# Patient Record
Sex: FEMALE | Race: WHITE | NOT HISPANIC OR LATINO | Employment: OTHER
[De-identification: names, ages, dates, MRNs, and addresses within clinical notes are randomized per-mention and may not be internally consistent; named-entity substitution may affect disease eponyms.]

---

## 2017-04-17 DIAGNOSIS — E55.9 VITAMIN D DEFICIENCY: Primary | ICD-10-CM

## 2017-04-17 PROCEDURE — 82306 VITAMIN D 25 HYDROXY: CPT | Mod: 90 | Performed by: NURSE PRACTITIONER

## 2017-04-17 PROCEDURE — 99000 SPECIMEN HANDLING OFFICE-LAB: CPT | Performed by: NURSE PRACTITIONER

## 2017-04-17 PROCEDURE — 36415 COLL VENOUS BLD VENIPUNCTURE: CPT | Performed by: NURSE PRACTITIONER

## 2017-04-19 LAB — DEPRECATED CALCIDIOL+CALCIFEROL SERPL-MC: 15 UG/L (ref 20–75)

## 2017-07-08 ENCOUNTER — HEALTH MAINTENANCE LETTER (OUTPATIENT)
Age: 57
End: 2017-07-08

## 2017-08-22 DIAGNOSIS — Z12.31 VISIT FOR SCREENING MAMMOGRAM: Primary | ICD-10-CM

## 2017-10-03 ENCOUNTER — APPOINTMENT (OUTPATIENT)
Dept: OCCUPATIONAL MEDICINE | Facility: OTHER | Age: 57
End: 2017-10-03

## 2017-10-09 ENCOUNTER — RADIANT APPOINTMENT (OUTPATIENT)
Dept: MAMMOGRAPHY | Facility: OTHER | Age: 57
End: 2017-10-09
Attending: PHYSICIAN ASSISTANT
Payer: COMMERCIAL

## 2017-10-09 PROCEDURE — 77063 BREAST TOMOSYNTHESIS BI: CPT | Mod: TC

## 2017-10-09 PROCEDURE — G0202 SCR MAMMO BI INCL CAD: HCPCS | Mod: TC

## 2017-10-20 ENCOUNTER — RADIANT APPOINTMENT (OUTPATIENT)
Dept: MAMMOGRAPHY | Facility: OTHER | Age: 57
End: 2017-10-20
Attending: PHYSICIAN ASSISTANT
Payer: COMMERCIAL

## 2017-10-20 DIAGNOSIS — R92.8 ABNORMAL MAMMOGRAM: ICD-10-CM

## 2017-10-20 PROCEDURE — G0279 TOMOSYNTHESIS, MAMMO: HCPCS | Mod: TC

## 2017-10-20 PROCEDURE — G0206 DX MAMMO INCL CAD UNI: HCPCS | Mod: TC

## 2017-11-06 ENCOUNTER — TELEPHONE (OUTPATIENT)
Dept: FAMILY MEDICINE | Facility: OTHER | Age: 57
End: 2017-11-06

## 2017-11-06 NOTE — TELEPHONE ENCOUNTER
8:13 AM    Reason for Call: OVERBOOK    Patient is having the following symptoms: rash on neck for 3 weeks.    The patient is requesting an appointment for 11/06/2017 with FUAD Puri.    Was an appointment offered for this call? Yes  If yes : Appointment type              Date    Preferred method for responding to this message: Telephone Call  What is your phone number ?[phone]    If we cannot reach you directly, may we leave a detailed response at the number you provided? Yes    Can this message wait until your PCP/provider returns, if unavailable today? Carla,     Kaitlin Ward

## 2017-11-06 NOTE — TELEPHONE ENCOUNTER
Unable to see today. Can see if another provider has openings or offer urgent care. Can offer first available appointment with Toyin Puri.  Maritza Manuel LPN

## 2017-11-13 ENCOUNTER — OFFICE VISIT (OUTPATIENT)
Dept: FAMILY MEDICINE | Facility: OTHER | Age: 57
End: 2017-11-13
Attending: PHYSICIAN ASSISTANT
Payer: COMMERCIAL

## 2017-11-13 VITALS
SYSTOLIC BLOOD PRESSURE: 118 MMHG | HEIGHT: 63 IN | TEMPERATURE: 98 F | OXYGEN SATURATION: 100 % | HEART RATE: 98 BPM | WEIGHT: 158 LBS | BODY MASS INDEX: 28 KG/M2 | DIASTOLIC BLOOD PRESSURE: 62 MMHG

## 2017-11-13 DIAGNOSIS — E55.9 VITAMIN D DEFICIENCY DISEASE: ICD-10-CM

## 2017-11-13 DIAGNOSIS — R76.8 ELEVATED RHEUMATOID FACTOR: Primary | ICD-10-CM

## 2017-11-13 LAB
CRP SERPL-MCNC: <2.9 MG/L (ref 0–8)
ERYTHROCYTE [SEDIMENTATION RATE] IN BLOOD BY WESTERGREN METHOD: 8 MM/H (ref 0–30)

## 2017-11-13 PROCEDURE — 86140 C-REACTIVE PROTEIN: CPT | Performed by: PHYSICIAN ASSISTANT

## 2017-11-13 PROCEDURE — 99214 OFFICE O/P EST MOD 30 MIN: CPT | Performed by: PHYSICIAN ASSISTANT

## 2017-11-13 PROCEDURE — 36415 COLL VENOUS BLD VENIPUNCTURE: CPT | Performed by: PHYSICIAN ASSISTANT

## 2017-11-13 PROCEDURE — 99000 SPECIMEN HANDLING OFFICE-LAB: CPT | Performed by: PHYSICIAN ASSISTANT

## 2017-11-13 PROCEDURE — 86431 RHEUMATOID FACTOR QUANT: CPT | Mod: 90 | Performed by: PHYSICIAN ASSISTANT

## 2017-11-13 PROCEDURE — 85652 RBC SED RATE AUTOMATED: CPT | Performed by: PHYSICIAN ASSISTANT

## 2017-11-13 ASSESSMENT — ANXIETY QUESTIONNAIRES
5. BEING SO RESTLESS THAT IT IS HARD TO SIT STILL: NOT AT ALL
3. WORRYING TOO MUCH ABOUT DIFFERENT THINGS: NOT AT ALL
1. FEELING NERVOUS, ANXIOUS, OR ON EDGE: NOT AT ALL
4. TROUBLE RELAXING: NOT AT ALL
2. NOT BEING ABLE TO STOP OR CONTROL WORRYING: NOT AT ALL
7. FEELING AFRAID AS IF SOMETHING AWFUL MIGHT HAPPEN: NOT AT ALL
6. BECOMING EASILY ANNOYED OR IRRITABLE: NOT AT ALL
GAD7 TOTAL SCORE: 0

## 2017-11-13 ASSESSMENT — PATIENT HEALTH QUESTIONNAIRE - PHQ9: SUM OF ALL RESPONSES TO PHQ QUESTIONS 1-9: 0

## 2017-11-13 ASSESSMENT — PAIN SCALES - GENERAL: PAINLEVEL: NO PAIN (0)

## 2017-11-13 NOTE — MR AVS SNAPSHOT
After Visit Summary   11/13/2017    Patrizia Delcid    MRN: [id number]           Patient Information     Date Of Birth          1960        Visit Information        Provider Department      11/13/2017 2:15 PM Toyin Puri PA [facility]        Today's Diagnoses     Elevated rheumatoid factor    -  1    Vitamin D deficiency disease          Care Instructions      Thank you for choosing [facility].   I have office hours 8:00 am to 4:30 pm on Monday's, Wednesday's, Thursday's and Friday's. My nurse and I are out of the office every Tuesday.    Following your visit, when your labs and diagnostic testing have returned, I will review then and you will be contacted by my nurse.  If you are on My Chart, you can also view results there.    For refills, notify your pharmacy regarding what you need and the pharmacy will generate a refill request. Do not call my nurse as she is unable to process refill request. Please plan ahead and allow 3-5 days for refill requests.    You will generally receive a reminder call the day prior to your appointment.  If you cannot attend your appointment, please cancel your appointment with as much notice as possible.  If there is a pattern of failure to present for your appointments, I cannot provide consistent, meaningful, ongoing care for you. It is very important to me that you come in for your care, so we can best assist you with your health care needs.    IMPORTANT:  Please note that it is my standard of practice to NOT participate in prescribing ongoing requested Narcotic Analgesic therapy, and/or participate in the prescribing of other controlled substances.  My nurse and I am happy to assist you with the process of referral for alternative pain management as needed, and other treatment modalities including but not limited to:  Physical Therapy, Physical Medicine and Rehab, Counseling, Chiropractic Care, Orthopedic Care, and non-narcotic  medication management.     In the event that you need to be seen for emergent concerns and I am out of office,  please see one of my colleagues for acute concerns.  You may also present to UC or ER.  I appreciate the opportunity to serve you and look forward to supporting your healthcare needs in the future. Please contact me with any questions or concerns that you may have.    Sincerely,      Toyin Puri RN, PA-C               Follow-ups after your visit        Future tests that were ordered for you today     Open Future Orders        Priority Expected Expires Ordered    Vitamin D Deficiency Routine  11/13/2018 11/13/2017    Vitamin D Deficiency Routine  11/13/2018 11/13/2017            Who to contact     If you have questions or need follow up information about today's clinic visit or your schedule please contact [facility] [place] [facility] directly at [phone].  Normal or non-critical lab and imaging results will be communicated to you by [employer], letter or phone within 4 business days after the clinic has received the results. If you do not hear from us within 7 days, please contact the clinic through [employer] or phone. If you have a critical or abnormal lab result, we will notify you by phone as soon as possible.  Submit refill requests through [employer] or call your pharmacy and they will forward the refill request to us. Please allow 3 business days for your refill to be completed.          Additional Information About Your Visit        [employer] Information     [employer] gives you secure access to your electronic health record. If you see a primary care provider, you can also send messages to your care team and make appointments. If you have questions, please call your primary care clinic.  If you do not have a primary care provider, please call [phone] and they will assist you.        Care EveryWhere ID     This is your Care EveryWhere ID. This could be used by other organizations to access your [place]  "medical records  [id number]        Your Vitals Were     Pulse Temperature Height Pulse Oximetry Breastfeeding? BMI (Body Mass Index)    98 98  F (36.7  C) (Tympanic) 5' 3\" (1.6 m) 100% No 27.99 kg/m2       Blood Pressure from Last 3 Encounters:   11/13/17 118/62   10/27/16 128/76   04/06/16 118/78    Weight from Last 3 Encounters:   11/13/17 158 lb (71.7 kg)   10/27/16 160 lb (72.6 kg)   04/06/16 155 lb (70.3 kg)              We Performed the Following     CRP, inflammation     ESR: Erythrocyte sedimentation rate     Rheumatoid factor          Today's Medication Changes          These changes are accurate as of: 11/13/17  3:28 PM.  If you have any questions, ask your nurse or doctor.               Start taking these medicines.        Dose/Directions    cholecalciferol 66675 UNITS capsule   Commonly known as:  VITAMIN D3   Used for:  Vitamin D deficiency disease   Started by:  Toyin Puri PA        Dose:  1 capsule   Take 1 capsule (50,000 Units) by mouth once a week   Quantity:  12 capsule   Refills:  1            Where to get your medicines      These medications were sent to [facility] - [address]  [address] [place] [address]     Phone:  [phone]     cholecalciferol 13715 UNITS capsule                Primary Care Provider Office Phone # Fax #    MYRA Richardson [phone]       [facility] [address]  [place] [address]        Equal Access to Services     [facility] Center[facility] AH: Hadii ishmael murillo hadasho Soomaali, waaxda luqadaha, qaybta kaalmada adeegyada, deng pavon. So [facility] [phone].    ATENCIÓN: Si habla español, tiene a talbert disposición servicios gratuitos de asistencia lingüística. Llame al [phone].    We comply with applicable federal civil rights laws and [place] laws. We do not discriminate on the basis of race, color, national origin, age, disability, sex, sexual orientation, or " gender identity.            Thank you!     [facility] you [facility]  [facility]. Our goal is always to provide you with excellent care. Hearing back from our patients is one way we can continue to improve our services. Please take a few minutes to complete the written survey that you may receive in the mail after your visit with us. Thank you!             Your Updated Medication List - Protect others around you: Learn how to safely use, store and throw away your medicines at www.disposemymeds.org.          This list is accurate as of: 11/13/17  3:28 PM.  Always use your most recent med list.                   Brand Name Dispense Instructions for use Diagnosis    carisoprodol 350 MG tablet    SOMA    30 tablet    Take 1 tablet (350 mg) by mouth 3 times daily as needed for muscle spasms    Mid back pain       cholecalciferol 60358 UNITS capsule    VITAMIN D3    12 capsule    Take 1 capsule (50,000 Units) by mouth once a week    Vitamin D deficiency disease       ZYRTEC ALLERGY 10 MG tablet   Generic drug:  cetirizine      Take 10 mg by mouth daily as needed.

## 2017-11-13 NOTE — PROGRESS NOTES
SUBJECTIVE:   Patrizia Delcid is a 57 year old female who presents to clinic today for the following health issues:        Rash      Duration: 2-3 weeks     Description  Location: neck   Itching: severe    Intensity:  moderate    Accompanying signs and symptoms: None    History (similar episodes/previous evaluation): None    Precipitating or alleviating factors:  New exposures:  None  Recent travel: no      Therapies tried and outcome: none            Problem list and histories reviewed & adjusted, as indicated.  Additional history: as documented    Patient Active Problem List   Diagnosis     ACP (advance care planning)     Primary osteoarthritis of right shoulder     Past Surgical History:   Procedure Laterality Date     BIOPSY      breast biopsy     CHOLECYSTECTOMY       COLONOSCOPY  02/17/2011     GYN SURGERY      hysterectomy     LEFT LUMPECTOMY      Benign     salpingo-oopherectomy bilateral         Social History   Substance Use Topics     Smoking status: Former Smoker     Types: Cigarettes     Smokeless tobacco: Never Used      Comment: Tried to Quit (YES); YR QUIT (1980); Passive Exposure (NO)     Alcohol use 0.0 oz/week      Comment: RARELY     Family History   Problem Relation Age of Onset     Myocardial Infarction Mother      HEART DISEASE Father      CANCER Father      LUNG     CANCER Brother      LUNG     Myocardial Infarction Other      MYOCARDIAL INFARCTION     CANCER Brother      TESTICULAR     Breast Cancer Other      C.A.D. Father      C.A.D. Mother      Myocardial Infarction Other      MYOCARDIAL INFARCTION         Current Outpatient Prescriptions   Medication Sig Dispense Refill     cetirizine (ZYRTEC ALLERGY) 10 MG tablet Take 10 mg by mouth daily as needed.       carisoprodol (SOMA) 350 MG tablet Take 1 tablet (350 mg) by mouth 3 times daily as needed for muscle spasms (Patient not taking: Reported on 11/13/2017) 30 tablet 0     Allergies   Allergen Reactions     Codeine Nausea     Recent  "Labs   Lab Test  10/27/16   1608  12/28/13   1939  11/04/13   0822   LDL   --    --   150*   HDL   --    --   43   TRIG   --    --   154*   ALT  36  31  36   CR  0.73  0.94  0.94   GFRESTIMATED  82  62  62   GFRESTBLACK  >90   GFR Calc    75  75   POTASSIUM  4.0  3.6  4.7   TSH  1.37   --   2.21      BP Readings from Last 3 Encounters:   11/13/17 118/62   10/27/16 128/76   04/06/16 118/78    Wt Readings from Last 3 Encounters:   11/13/17 158 lb (71.7 kg)   10/27/16 160 lb (72.6 kg)   04/06/16 155 lb (70.3 kg)                          Reviewed and updated as needed this [facility] by [facility]  Meds       Reviewed and updated as needed this visit by Provider         ROS:  Constitutional, neuro, ENT, endocrine, pulmonary, cardiac, gastrointestinal, genitourinary, musculoskeletal, integument and psychiatric systems are negative, except as otherwise noted.      OBJECTIVE:                                                    /62 (BP Location: Left arm, Patient Position: Chair, Cuff Size: Adult Large)  Pulse 98  Temp 98  F (36.7  C) (Tympanic)  Ht 5' 3\" (1.6 m)  Wt 158 lb (71.7 kg)  SpO2 100%  Breastfeeding? No  BMI 27.99 kg/m2  Body mass index is 27.99 kg/(m^2).  GENERAL APPEARANCE: healthy, alert and no distress  EYES: Eyes grossly normal to inspection, PERRL and conjunctivae and sclerae normal  HENT: ear canals and TM's normal and nose and mouth without ulcers or lesions  NECK: no adenopathy, no asymmetry, masses, or scars and thyroid normal to palpation  RESP: lungs clear to auscultation - no rales, rhonchi or wheezes  CV: regular rates and rhythm, normal S1 S2, no S3 or S4 and no murmur, click or rub  LYMPHATICS: normal ant/post cervical and supraclavicular nodes  ABDOMEN: soft, nontender, without hepatosplenomegaly or masses and bowel sounds normal  MS: extremities normal- no gross deformities noted.   SKIN: no suspicious lesions or rashes. Where there was a rash no further rash " "remains.   NEURO: Normal strength and tone, mentation intact and speech normal  PSYCH: mentation appears normal and affect normal/bright    Diagnostic test results:  Diagnostic Test Results:  No results found for this or any previous visit (from the past 24 hour(s)).  Results for orders placed or performed in visit on 11/13/17   ESR: Erythrocyte sedimentation rate   Result Value Ref Range    Sed Rate 8 0 - 30 mm/h   CRP, inflammation   Result Value Ref Range    CRP Inflammation <2.9 0.0 - 8.0 mg/L   Rheumatoid factor   Result Value Ref Range    Rheumatoid Factor 80 (H) <20 IU/mL          ASSESSMENT/PLAN:                                                    1. Elevated rheumatoid factor  Is seeing Rheumatology and they do not seem to be \"too excited\"  Rashes and severe episodes of fatigue continue.  Labs above show no elevation in her crp and the rash is gone.  I feel no warmth no sign of any type of vasculitis.No joint effusion.    Is going to be seeing Rheumatology again and we will send this note along with her.    - ESR: Erythrocyte sedimentation rate  - CRP, inflammation  - Rheumatoid factor    2. Vitamin D deficiency disease  On supplement but has severe aching.  We will check her labs.   - Vitamin D Deficiency; Future  - cholecalciferol (VITAMIN D3) 04068 UNITS capsule; Take 1 capsule (50,000 Units) by mouth once a week  Dispense: 12 capsule; Refill: 1  - Vitamin D Deficiency; Future        See Patient Instructions    MYRA Tsang  [facility] [place]  "

## 2017-11-13 NOTE — PATIENT INSTRUCTIONS
Thank you for choosing [facility].   I have office hours 8:00 am to 4:30 pm on Monday's, Wednesday's, Thursday's and Friday's. My nurse and I are out of the office every Tuesday.    Following your visit, when your labs and diagnostic testing have returned, I will review then and you will be contacted by my nurse.  If you are on My Chart, you can also view results there.    For refills, notify your pharmacy regarding what you need and the pharmacy will generate a refill request. Do not call my nurse as she is unable to process refill request. Please plan ahead and allow 3-5 days for refill requests.    You will generally receive a reminder call the day prior to your appointment.  If you cannot attend your appointment, please cancel your appointment with as much notice as possible.  If there is a pattern of failure to present for your appointments, I cannot provide consistent, meaningful, ongoing care for you. It is very important to me that you come in for your care, so we can best assist you with your health care needs.    IMPORTANT:  Please note that it is my standard of practice to NOT participate in prescribing ongoing requested Narcotic Analgesic therapy, and/or participate in the prescribing of other controlled substances.  My nurse and I am happy to assist you with the process of referral for alternative pain management as needed, and other treatment modalities including but not limited to:  Physical Therapy, Physical Medicine and Rehab, Counseling, Chiropractic Care, Orthopedic Care, and non-narcotic medication management.     In the event that you need to be seen for emergent concerns and I am out of office,  please see one of my colleagues for acute concerns.  You may also present to  or ER.  I appreciate the opportunity to serve you and look forward to supporting your healthcare needs in the future. Please contact me with any questions or concerns that you may  have.    Sincerely,      Toyin Puri RN, PA-C

## 2017-11-13 NOTE — NURSING NOTE
"Chief Complaint   Patient presents with     Derm Problem     rash on neck        Initial /62 (BP Location: Left arm, Patient Position: Chair, Cuff Size: Adult Large)  Pulse 98  Temp 98  F (36.7  C) (Tympanic)  Ht 5' 3\" (1.6 m)  Wt 158 lb (71.7 kg)  SpO2 100%  Breastfeeding? No  BMI 27.99 kg/m2 Estimated body mass index is 27.99 kg/(m^2) as calculated from the following:    Height as of this encounter: 5' 3\" (1.6 m).    Weight as of this encounter: 158 lb (71.7 kg).  Medication Reconciliation: complete   Danyelle Bonds CMA(AAMA)   "

## 2017-11-14 ASSESSMENT — ANXIETY QUESTIONNAIRES: GAD7 TOTAL SCORE: 0

## 2017-11-15 LAB — RHEUMATOID FACT SER NEPH-ACNC: 80 IU/ML (ref 0–20)

## 2017-12-28 DIAGNOSIS — B02.7 DISSEMINATED HERPES ZOSTER: ICD-10-CM

## 2017-12-29 NOTE — TELEPHONE ENCOUNTER
Valtrex      Last Written Prescription Date: 10/27/16  Last Fill Quantity: 21,  # refills: 0   Last Office Visit with G, P or [facility] prescribing provider: 11/13/17

## 2018-01-02 RX ORDER — VALACYCLOVIR HYDROCHLORIDE 1 G/1
TABLET, FILM COATED ORAL
Qty: 21 TABLET | Refills: 1 | Status: SHIPPED | OUTPATIENT
Start: 2018-01-02 | End: 2020-03-25

## 2018-01-31 ENCOUNTER — TELEPHONE (OUTPATIENT)
Dept: FAMILY MEDICINE | Facility: OTHER | Age: 58
End: 2018-01-31

## 2018-01-31 NOTE — TELEPHONE ENCOUNTER
9:10 AM    Reason for Call: Phone Call    Description: Pt is calling to see if she could get a prescription for diverticulitis flare up she does not want to come in if she doesn't have to    Was an appointment offered for this call? No  If yes : Appointment type              Date    Preferred method for responding to this message: Telephone Call  What is your phone number ?    If we cannot reach you directly, may we leave a detailed response at the number you provided? Yes    Can this message wait until your PCP/provider returns, if available today? Not applicable, PCP is in     Torrie Ludlow

## 2018-01-31 NOTE — TELEPHONE ENCOUNTER
I do not see any antibiotics on her hx that would treat that at all. So if a flare has been years.  Probably should see her.

## 2018-02-01 ENCOUNTER — OFFICE VISIT (OUTPATIENT)
Dept: FAMILY MEDICINE | Facility: OTHER | Age: 58
End: 2018-02-01
Attending: PHYSICIAN ASSISTANT
Payer: COMMERCIAL

## 2018-02-01 VITALS
OXYGEN SATURATION: 98 % | HEART RATE: 65 BPM | DIASTOLIC BLOOD PRESSURE: 68 MMHG | SYSTOLIC BLOOD PRESSURE: 120 MMHG | TEMPERATURE: 97.7 F | WEIGHT: 166 LBS | HEIGHT: 65 IN | BODY MASS INDEX: 27.66 KG/M2

## 2018-02-01 DIAGNOSIS — R10.30 LOWER ABDOMINAL PAIN: Primary | ICD-10-CM

## 2018-02-01 DIAGNOSIS — L30.0 NUMMULAR ECZEMA: ICD-10-CM

## 2018-02-01 DIAGNOSIS — R82.90 ABNORMAL URINE FINDINGS: ICD-10-CM

## 2018-02-01 DIAGNOSIS — R79.89 LOW VITAMIN D LEVEL: ICD-10-CM

## 2018-02-01 LAB
ALBUMIN UR-MCNC: NEGATIVE MG/DL
ANION GAP SERPL CALCULATED.3IONS-SCNC: 7 MMOL/L (ref 3–14)
APPEARANCE UR: CLEAR
BACTERIA #/AREA URNS HPF: ABNORMAL /HPF
BASOPHILS # BLD AUTO: 0 10E9/L (ref 0–0.2)
BASOPHILS NFR BLD AUTO: 0.5 %
BILIRUB UR QL STRIP: NEGATIVE
BUN SERPL-MCNC: 8 MG/DL (ref 7–30)
CALCIUM SERPL-MCNC: 9.1 MG/DL (ref 8.5–10.1)
CHLORIDE SERPL-SCNC: 105 MMOL/L (ref 94–109)
CO2 SERPL-SCNC: 27 MMOL/L (ref 20–32)
COLOR UR AUTO: ABNORMAL
CREAT SERPL-MCNC: 0.77 MG/DL (ref 0.52–1.04)
CRP SERPL-MCNC: 12.3 MG/L (ref 0–8)
DIFFERENTIAL METHOD BLD: NORMAL
EOSINOPHIL # BLD AUTO: 0.1 10E9/L (ref 0–0.7)
EOSINOPHIL NFR BLD AUTO: 3.2 %
ERYTHROCYTE [DISTWIDTH] IN BLOOD BY AUTOMATED COUNT: 12.5 % (ref 10–15)
GFR SERPL CREATININE-BSD FRML MDRD: 77 ML/MIN/1.7M2
GLUCOSE SERPL-MCNC: 79 MG/DL (ref 70–99)
GLUCOSE UR STRIP-MCNC: NEGATIVE MG/DL
HCT VFR BLD AUTO: 42 % (ref 35–47)
HGB BLD-MCNC: 14 G/DL (ref 11.7–15.7)
HGB UR QL STRIP: NEGATIVE
IMM GRANULOCYTES # BLD: 0 10E9/L (ref 0–0.4)
IMM GRANULOCYTES NFR BLD: 0.2 %
KETONES UR STRIP-MCNC: NEGATIVE MG/DL
LEUKOCYTE ESTERASE UR QL STRIP: NEGATIVE
LYMPHOCYTES # BLD AUTO: 1.8 10E9/L (ref 0.8–5.3)
LYMPHOCYTES NFR BLD AUTO: 41 %
MCH RBC QN AUTO: 30.7 PG (ref 26.5–33)
MCHC RBC AUTO-ENTMCNC: 33.3 G/DL (ref 31.5–36.5)
MCV RBC AUTO: 92 FL (ref 78–100)
MONOCYTES # BLD AUTO: 0.4 10E9/L (ref 0–1.3)
MONOCYTES NFR BLD AUTO: 8.6 %
NEUTROPHILS # BLD AUTO: 2.1 10E9/L (ref 1.6–8.3)
NEUTROPHILS NFR BLD AUTO: 46.5 %
NITRATE UR QL: NEGATIVE
NRBC # BLD AUTO: 0 10*3/UL
NRBC BLD AUTO-RTO: 0 /100
PH UR STRIP: 6.5 PH (ref 4.7–8)
PLATELET # BLD AUTO: 206 10E9/L (ref 150–450)
POTASSIUM SERPL-SCNC: 3.8 MMOL/L (ref 3.4–5.3)
RBC # BLD AUTO: 4.56 10E12/L (ref 3.8–5.2)
RBC #/AREA URNS AUTO: 1 /HPF (ref 0–2)
SODIUM SERPL-SCNC: 139 MMOL/L (ref 133–144)
SOURCE: ABNORMAL
SP GR UR STRIP: 1 (ref 1–1.03)
UROBILINOGEN UR STRIP-MCNC: NORMAL MG/DL (ref 0–2)
WBC # BLD AUTO: 4.4 10E9/L (ref 4–11)
WBC #/AREA URNS AUTO: <1 /HPF (ref 0–2)

## 2018-02-01 PROCEDURE — 80048 BASIC METABOLIC PNL TOTAL CA: CPT | Performed by: PHYSICIAN ASSISTANT

## 2018-02-01 PROCEDURE — 86140 C-REACTIVE PROTEIN: CPT | Performed by: PHYSICIAN ASSISTANT

## 2018-02-01 PROCEDURE — 36415 COLL VENOUS BLD VENIPUNCTURE: CPT | Performed by: PHYSICIAN ASSISTANT

## 2018-02-01 PROCEDURE — 87086 URINE CULTURE/COLONY COUNT: CPT | Performed by: PHYSICIAN ASSISTANT

## 2018-02-01 PROCEDURE — 81001 URINALYSIS AUTO W/SCOPE: CPT | Performed by: PHYSICIAN ASSISTANT

## 2018-02-01 PROCEDURE — 99214 OFFICE O/P EST MOD 30 MIN: CPT | Performed by: PHYSICIAN ASSISTANT

## 2018-02-01 PROCEDURE — 85025 COMPLETE CBC W/AUTO DIFF WBC: CPT | Performed by: PHYSICIAN ASSISTANT

## 2018-02-01 RX ORDER — TRIAMCINOLONE ACETONIDE 1 MG/G
CREAM TOPICAL
Qty: 80 G | Refills: 0 | Status: SHIPPED | OUTPATIENT
Start: 2018-02-01 | End: 2019-01-03

## 2018-02-01 ASSESSMENT — PAIN SCALES - GENERAL: PAINLEVEL: MODERATE PAIN (5)

## 2018-02-01 ASSESSMENT — ANXIETY QUESTIONNAIRES
7. FEELING AFRAID AS IF SOMETHING AWFUL MIGHT HAPPEN: NOT AT ALL
6. BECOMING EASILY ANNOYED OR IRRITABLE: NOT AT ALL
GAD7 TOTAL SCORE: 0
5. BEING SO RESTLESS THAT IT IS HARD TO SIT STILL: NOT AT ALL
1. FEELING NERVOUS, ANXIOUS, OR ON EDGE: NOT AT ALL
3. WORRYING TOO MUCH ABOUT DIFFERENT THINGS: NOT AT ALL
2. NOT BEING ABLE TO STOP OR CONTROL WORRYING: NOT AT ALL

## 2018-02-01 ASSESSMENT — PATIENT HEALTH QUESTIONNAIRE - PHQ9: 5. POOR APPETITE OR OVEREATING: NOT AT ALL

## 2018-02-01 NOTE — PATIENT INSTRUCTIONS
Results for orders placed or performed in visit on 02/01/18 (from the past 24 hour(s))   CRP, inflammation   Result Value Ref Range    CRP Inflammation 12.3 (H) 0.0 - 8.0 mg/L   CBC with platelets and differential   Result Value Ref Range    WBC 4.4 4.0 - 11.0 10e9/L    RBC Count 4.56 3.8 - 5.2 10e12/L    Hemoglobin 14.0 11.7 - 15.7 g/dL    Hematocrit 42.0 35.0 - 47.0 %    MCV 92 78 - 100 fl    MCH 30.7 26.5 - 33.0 pg    MCHC 33.3 31.5 - 36.5 g/dL    RDW 12.5 10.0 - 15.0 %    Platelet Count 206 150 - 450 10e9/L    Diff Method Automated Method     % Neutrophils 46.5 %    % Lymphocytes 41.0 %    % Monocytes 8.6 %    % Eosinophils 3.2 %    % Basophils 0.5 %    % Immature Granulocytes 0.2 %    Nucleated RBCs 0 0 /100    Absolute Neutrophil 2.1 1.6 - 8.3 10e9/L    Absolute Lymphocytes 1.8 0.8 - 5.3 10e9/L    Absolute Monocytes 0.4 0.0 - 1.3 10e9/L    Absolute Eosinophils 0.1 0.0 - 0.7 10e9/L    Absolute Basophils 0.0 0.0 - 0.2 10e9/L    Abs Immature Granulocytes 0.0 0 - 0.4 10e9/L    Absolute Nucleated RBC 0.0    Basic metabolic panel   Result Value Ref Range    Sodium 139 133 - 144 mmol/L    Potassium 3.8 3.4 - 5.3 mmol/L    Chloride 105 94 - 109 mmol/L    Carbon Dioxide 27 20 - 32 mmol/L    Anion Gap 7 3 - 14 mmol/L    Glucose 79 70 - 99 mg/dL    Urea Nitrogen 8 7 - 30 mg/dL    Creatinine 0.77 0.52 - 1.04 mg/dL    GFR Estimate 77 >60 mL/min/1.7m2    GFR Estimate If Black >90 >60 mL/min/1.7m2    Calcium 9.1 8.5 - 10.1 mg/dL   UA with Microscopic reflex to Culture   Result Value Ref Range    Color Urine Straw     Appearance Urine Clear     Glucose Urine Negative NEG^Negative mg/dL    Bilirubin Urine Negative NEG^Negative    Ketones Urine Negative NEG^Negative mg/dL    Specific Gravity Urine 1.003 1.003 - 1.035    Blood Urine Negative NEG^Negative    pH Urine 6.5 4.7 - 8.0 pH    Protein Albumin Urine Negative NEG^Negative mg/dL    Urobilinogen mg/dL Normal 0.0 - 2.0 mg/dL    Nitrite Urine Negative NEG^Negative     Leukocyte Esterase Urine Negative NEG^Negative    Source Midstream Urine     WBC Urine <1 0 - 2 /HPF    RBC Urine 1 0 - 2 /HPF    Bacteria Urine Few (A) NEG^Negative /HPF

## 2018-02-01 NOTE — PROGRESS NOTES
"  SUBJECTIVE:   Patrizia Delcid is a 57 year old female who presents to clinic today for the following health issues:      Abdominal Pain      Duration: was diagnosed with diverticulitis over 9 years ago    Description (location/character/radiation): pt describes lower abdomen \"burning\"        Associated flank pain: None    Intensity:  5/10    Accompanying signs and symptoms:        Fever/Chills: no        Gas/Bloating: YES- bloating       Nausea/vomitting: YES- nausesa       Diarrhea: no        Dysuria or Hematuria: no     History (previous similar pain/trauma/previous testing): diagnosed with diverticulitis about 9 years ago    Precipitating or alleviating factors:       Pain worse with eating/BM/urination: worse after eating and BM       Pain relieved by BM: no     Therapies tried and outcome: soft diet- some relief    LMP:  Hysterectomy- N/A            ROS:  Constitutional, neuro, ENT, endocrine, pulmonary, cardiac, gastrointestinal, genitourinary, musculoskeletal, integument and psychiatric systems are negative, except as otherwise noted.    OBJECTIVE:                                                    /68 (BP Location: Left arm, Patient Position: Supine, Cuff Size: Adult Regular)  Pulse 65  Temp 97.7  F (36.5  C) (Tympanic)  Ht 5' 5\" (1.651 m)  Wt 166 lb (75.3 kg)  SpO2 98%  BMI 27.62 kg/m2  Body mass index is 27.62 kg/(m^2).  GENERAL APPEARANCE: healthy, alert and no distress  EYES: Eyes grossly normal to inspection, PERRL and conjunctivae and sclerae normal  HENT: ear canals and TM's normal and nose and mouth without ulcers or lesions  NECK: no adenopathy, no asymmetry, masses, or scars and thyroid normal to palpation  RESP: lungs clear to auscultation - no rales, rhonchi or wheezes  CV: regular rates and rhythm, normal S1 S2, no S3 or S4 and no murmur, click or rub  LYMPHATICS: normal ant/post cervical and supraclavicular nodes  MS: extremities normal- no gross deformities noted  ABD:  Lower " 1/3 of abdomen is very uncomfortable. No rebound. Bowel sounds hyperactive.   SKIN: no suspicious lesions or rashes  NEURO: Normal strength and tone, mentation intact and speech normal  PSYCH: mentation appears normal and affect normal/bright    Diagnostic test results:  Diagnostic Test Results:  Results for orders placed or performed in visit on 02/01/18 (from the past 24 hour(s))   CRP, inflammation   Result Value Ref Range    CRP Inflammation 12.3 (H) 0.0 - 8.0 mg/L   CBC with platelets and differential   Result Value Ref Range    WBC 4.4 4.0 - 11.0 10e9/L    RBC Count 4.56 3.8 - 5.2 10e12/L    Hemoglobin 14.0 11.7 - 15.7 g/dL    Hematocrit 42.0 35.0 - 47.0 %    MCV 92 78 - 100 fl    MCH 30.7 26.5 - 33.0 pg    MCHC 33.3 31.5 - 36.5 g/dL    RDW 12.5 10.0 - 15.0 %    Platelet Count 206 150 - 450 10e9/L    Diff Method Automated Method     % Neutrophils 46.5 %    % Lymphocytes 41.0 %    % Monocytes 8.6 %    % Eosinophils 3.2 %    % Basophils 0.5 %    % Immature Granulocytes 0.2 %    Nucleated RBCs 0 0 /100    Absolute Neutrophil 2.1 1.6 - 8.3 10e9/L    Absolute Lymphocytes 1.8 0.8 - 5.3 10e9/L    Absolute Monocytes 0.4 0.0 - 1.3 10e9/L    Absolute Eosinophils 0.1 0.0 - 0.7 10e9/L    Absolute Basophils 0.0 0.0 - 0.2 10e9/L    Abs Immature Granulocytes 0.0 0 - 0.4 10e9/L    Absolute Nucleated RBC 0.0    Basic metabolic panel   Result Value Ref Range    Sodium 139 133 - 144 mmol/L    Potassium 3.8 3.4 - 5.3 mmol/L    Chloride 105 94 - 109 mmol/L    Carbon Dioxide 27 20 - 32 mmol/L    Anion Gap 7 3 - 14 mmol/L    Glucose 79 70 - 99 mg/dL    Urea Nitrogen 8 7 - 30 mg/dL    Creatinine 0.77 0.52 - 1.04 mg/dL    GFR Estimate 77 >60 mL/min/1.7m2    GFR Estimate If Black >90 >60 mL/min/1.7m2    Calcium 9.1 8.5 - 10.1 mg/dL   UA with Microscopic reflex to Culture   Result Value Ref Range    Color Urine Straw     Appearance Urine Clear     Glucose Urine Negative NEG^Negative mg/dL    Bilirubin Urine Negative NEG^Negative     Ketones Urine Negative NEG^Negative mg/dL    Specific Gravity Urine 1.003 1.003 - 1.035    Blood Urine Negative NEG^Negative    pH Urine 6.5 4.7 - 8.0 pH    Protein Albumin Urine Negative NEG^Negative mg/dL    Urobilinogen mg/dL Normal 0.0 - 2.0 mg/dL    Nitrite Urine Negative NEG^Negative    Leukocyte Esterase Urine Negative NEG^Negative    Source Midstream Urine     WBC Urine <1 0 - 2 /HPF    RBC Urine 1 0 - 2 /HPF    Bacteria Urine Few (A) NEG^Negative /HPF        ASSESSMENT/PLAN:                                                    1. Lower abdominal pain  She is feeling better today. Labs are not really conclusive.  We will be watching this and watching for a fever.  No other sx on exam.  See us back as recommended.   - UA with Microscopic reflex to Culture  - CRP, inflammation  - CBC with platelets and differential  - Basic metabolic panel      See Patient Instructions    MYRA Tsang  [facility] [place]

## 2018-02-01 NOTE — MR AVS SNAPSHOT
After Visit Summary   2/1/2018    Patrizia Delcid    MRN: [id number]           Patient Information     Date Of Birth          1960        Visit Information        Provider Department      2/1/2018 2:00 PM Toyin Puri PA [facility] [place]        Today's Diagnoses     Lower abdominal pain    -  1    Nummular eczema        Low vitamin D level          Care Instructions    Results for orders placed or performed in visit on 02/01/18 (from the past 24 hour(s))   CRP, inflammation   Result Value Ref Range    CRP Inflammation 12.3 (H) 0.0 - 8.0 mg/L   CBC with platelets and differential   Result Value Ref Range    WBC 4.4 4.0 - 11.0 10e9/L    RBC Count 4.56 3.8 - 5.2 10e12/L    Hemoglobin 14.0 11.7 - 15.7 g/dL    Hematocrit 42.0 35.0 - 47.0 %    MCV 92 78 - 100 fl    MCH 30.7 26.5 - 33.0 pg    MCHC 33.3 31.5 - 36.5 g/dL    RDW 12.5 10.0 - 15.0 %    Platelet Count 206 150 - 450 10e9/L    Diff Method Automated Method     % Neutrophils 46.5 %    % Lymphocytes 41.0 %    % Monocytes 8.6 %    % Eosinophils 3.2 %    % Basophils 0.5 %    % Immature Granulocytes 0.2 %    Nucleated RBCs 0 0 /100    Absolute Neutrophil 2.1 1.6 - 8.3 10e9/L    Absolute Lymphocytes 1.8 0.8 - 5.3 10e9/L    Absolute Monocytes 0.4 0.0 - 1.3 10e9/L    Absolute Eosinophils 0.1 0.0 - 0.7 10e9/L    Absolute Basophils 0.0 0.0 - 0.2 10e9/L    Abs Immature Granulocytes 0.0 0 - 0.4 10e9/L    Absolute Nucleated RBC 0.0    Basic metabolic panel   Result Value Ref Range    Sodium 139 133 - 144 mmol/L    Potassium 3.8 3.4 - 5.3 mmol/L    Chloride 105 94 - 109 mmol/L    Carbon Dioxide 27 20 - 32 mmol/L    Anion Gap 7 3 - 14 mmol/L    Glucose 79 70 - 99 mg/dL    Urea Nitrogen 8 7 - 30 mg/dL    Creatinine 0.77 0.52 - 1.04 mg/dL    GFR Estimate 77 >60 mL/min/1.7m2    GFR Estimate If Black >90 >60 mL/min/1.7m2    Calcium 9.1 8.5 - 10.1 mg/dL   UA with Microscopic reflex to Culture   Result Value Ref Range    Color Urine Straw      Appearance Urine Clear     Glucose Urine Negative NEG^Negative mg/dL    Bilirubin Urine Negative NEG^Negative    Ketones Urine Negative NEG^Negative mg/dL    Specific Gravity Urine 1.003 1.003 - 1.035    Blood Urine Negative NEG^Negative    pH Urine 6.5 4.7 - 8.0 pH    Protein Albumin Urine Negative NEG^Negative mg/dL    Urobilinogen mg/dL Normal 0.0 - 2.0 mg/dL    Nitrite Urine Negative NEG^Negative    Leukocyte Esterase Urine Negative NEG^Negative    Source Midstream Urine     WBC Urine <1 0 - 2 /HPF    RBC Urine 1 0 - 2 /HPF    Bacteria Urine Few (A) NEG^Negative /HPF               Follow-ups after your visit        Who to contact     If you have questions or need follow up information about today's clinic visit or your schedule please contact [facility] directly at [phone].  Normal or non-critical lab and imaging results will be communicated to you by Photolitechart, letter or phone within 4 business days after the clinic has received the results. If you do not hear from us within 7 days, please contact the clinic through Photolitechart or phone. If you have a critical or abnormal lab result, we will notify you by phone as soon as possible.  Submit refill requests through Grokker or call your pharmacy and they will forward the refill request to us. Please allow 3 business days for your refill to be completed.          Additional Information About Your Visit        Grokker Information     Grokker gives you secure access to your electronic health record. If you see a primary care provider, you can also send messages to your care team and make appointments. If you have questions, please call your primary care clinic.  If you do not have a primary care provider, please call [phone] and they will assist you.        Care EveryWhere ID     This is your Care EveryWhere ID. This could be used by other organizations to access your [facility] medical records  [id number]        Your Vitals Were     Pulse  "Temperature Height Pulse Oximetry BMI (Body Mass Index)       65 97.7  F (36.5  C) (Tympanic) 5' 5\" (1.651 m) 98% 27.62 kg/m2        Blood Pressure from Last 3 Encounters:   02/01/18 120/68   11/13/17 118/62   10/27/16 128/76    Weight from Last 3 Encounters:   02/01/18 166 lb (75.3 kg)   11/13/17 158 lb (71.7 kg)   10/27/16 160 lb (72.6 kg)              We Performed the Following     Basic metabolic panel     CBC with platelets and differential     CRP, inflammation     UA with Microscopic reflex to Culture          Today's Medication Changes          These changes are accurate as of 2/1/18  3:16 PM.  If you have any questions, ask your nurse or doctor.               Start taking these medicines.        Dose/Directions    triamcinolone 0.1 % cream   Commonly known as:  KENALOG   Used for:  Nummular eczema   Started by:  Toyin Puri PA        Apply sparingly to affected area three times daily as needed   Quantity:  80 g   Refills:  0            Where to get your medicines      These medications were sent to [facility] - [place] - [address]  [address] [place] [address]     Phone:  [phone]     triamcinolone 0.1 % cream                Primary Care Provider Office Phone # Fax #    MYRA Richardson [phone]       [facility] [address] [facility] [address]  [facility] [address]        Equal Access to Services     BENNETT MILLER AH: Hadii ishmael ku hadasho Soomaali, waaxda luqadaha, qaybta kaalmada adeegyada, waxay yuliana pavon. So [facility] [phone].    ATENCIÓN: Si habla cathy, tiene a talbert disposición servicios gratuitos de asistencia lingüística. Llame al [phone].    We comply with applicable federal civil rights laws and [place] laws. We do not discriminate on the basis of race, color, national origin, age, disability, sex, sexual orientation, or gender identity.            Thank you!     Thank you for choosing [facility] " [place]  for your care. Our goal is always to provide you with excellent care. Hearing back from our patients is one way we can continue to improve our services. Please take a few minutes to complete the written survey that you may receive in the mail after your visit with us. Thank you!             Your Updated Medication List - Protect others around you: Learn how to safely use, store and throw away your medicines at www.disposemymeds.org.          This list is accurate as of 2/1/18  3:16 PM.  Always use your most recent med list.                   Brand Name Dispense Instructions for use Diagnosis    carisoprodol 350 MG tablet    SOMA    30 tablet    Take 1 tablet (350 mg) by mouth 3 times daily as needed for muscle spasms    Mid back pain       cholecalciferol 79294 UNITS capsule    VITAMIN D3    12 capsule    Take 1 capsule (50,000 Units) by mouth once a week    Vitamin D deficiency disease       triamcinolone 0.1 % cream    KENALOG    80 g    Apply sparingly to affected area three times daily as needed    Nummular eczema       valACYclovir 1000 mg tablet    VALTREX    21 tablet    TAKE 1 TABLET BY MOUTH 3 TIMES DAILY    Disseminated herpes zoster       ZYRTEC ALLERGY 10 MG tablet   Generic drug:  cetirizine      Take 10 mg by mouth daily as needed.

## 2018-02-02 ASSESSMENT — ANXIETY QUESTIONNAIRES: GAD7 TOTAL SCORE: 0

## 2018-02-02 ASSESSMENT — PATIENT HEALTH QUESTIONNAIRE - PHQ9: SUM OF ALL RESPONSES TO PHQ QUESTIONS 1-9: 0

## 2018-02-03 LAB
BACTERIA SPEC CULT: NO GROWTH
SPECIMEN SOURCE: NORMAL

## 2018-02-17 ENCOUNTER — HOSPITAL ENCOUNTER (EMERGENCY)
Facility: HOSPITAL | Age: 58
Discharge: HOME | End: 2018-02-17
Attending: PHYSICIAN ASSISTANT | Admitting: PHYSICIAN ASSISTANT
Payer: COMMERCIAL

## 2018-02-17 ENCOUNTER — APPOINTMENT (OUTPATIENT)
Dept: GENERAL RADIOLOGY | Facility: HOSPITAL | Age: 58
End: 2018-02-17
Attending: PHYSICIAN ASSISTANT
Payer: COMMERCIAL

## 2018-02-17 VITALS
TEMPERATURE: 97.3 F | HEART RATE: 65 BPM | SYSTOLIC BLOOD PRESSURE: 135 MMHG | DIASTOLIC BLOOD PRESSURE: 80 MMHG | OXYGEN SATURATION: 97 % | RESPIRATION RATE: 18 BRPM

## 2018-02-17 DIAGNOSIS — R10.31 ABDOMINAL PAIN, RIGHT LOWER QUADRANT: ICD-10-CM

## 2018-02-17 LAB
ALBUMIN SERPL-MCNC: 3.9 G/DL (ref 3.4–5)
ALBUMIN UR-MCNC: NEGATIVE MG/DL
ALP SERPL-CCNC: 96 U/L (ref 40–150)
ALT SERPL W P-5'-P-CCNC: 36 U/L (ref 0–50)
ANION GAP SERPL CALCULATED.3IONS-SCNC: 9 MMOL/L (ref 3–14)
APPEARANCE UR: CLEAR
AST SERPL W P-5'-P-CCNC: 21 U/L (ref 0–45)
BASOPHILS # BLD AUTO: 0 10E9/L (ref 0–0.2)
BASOPHILS NFR BLD AUTO: 0.7 %
BILIRUB SERPL-MCNC: 2 MG/DL (ref 0.2–1.3)
BILIRUB UR QL STRIP: NEGATIVE
BUN SERPL-MCNC: 11 MG/DL (ref 7–30)
CALCIUM SERPL-MCNC: 9.1 MG/DL (ref 8.5–10.1)
CHLORIDE SERPL-SCNC: 105 MMOL/L (ref 94–109)
CO2 SERPL-SCNC: 25 MMOL/L (ref 20–32)
COLOR UR AUTO: NORMAL
CREAT SERPL-MCNC: 0.77 MG/DL (ref 0.52–1.04)
CRP SERPL-MCNC: <2.9 MG/L (ref 0–8)
DIFFERENTIAL METHOD BLD: NORMAL
EOSINOPHIL # BLD AUTO: 0.1 10E9/L (ref 0–0.7)
EOSINOPHIL NFR BLD AUTO: 2.6 %
ERYTHROCYTE [DISTWIDTH] IN BLOOD BY AUTOMATED COUNT: 12.7 % (ref 10–15)
GFR SERPL CREATININE-BSD FRML MDRD: 77 ML/MIN/1.7M2
GLUCOSE SERPL-MCNC: 90 MG/DL (ref 70–99)
GLUCOSE UR STRIP-MCNC: NEGATIVE MG/DL
HCT VFR BLD AUTO: 41.7 % (ref 35–47)
HGB BLD-MCNC: 14.2 G/DL (ref 11.7–15.7)
HGB UR QL STRIP: NEGATIVE
IMM GRANULOCYTES # BLD: 0 10E9/L (ref 0–0.4)
IMM GRANULOCYTES NFR BLD: 0.2 %
KETONES UR STRIP-MCNC: NEGATIVE MG/DL
LEUKOCYTE ESTERASE UR QL STRIP: NEGATIVE
LYMPHOCYTES # BLD AUTO: 1.5 10E9/L (ref 0.8–5.3)
LYMPHOCYTES NFR BLD AUTO: 32.1 %
MCH RBC QN AUTO: 31.2 PG (ref 26.5–33)
MCHC RBC AUTO-ENTMCNC: 34.1 G/DL (ref 31.5–36.5)
MCV RBC AUTO: 92 FL (ref 78–100)
MONOCYTES # BLD AUTO: 0.4 10E9/L (ref 0–1.3)
MONOCYTES NFR BLD AUTO: 7.8 %
NEUTROPHILS # BLD AUTO: 2.6 10E9/L (ref 1.6–8.3)
NEUTROPHILS NFR BLD AUTO: 56.6 %
NITRATE UR QL: NEGATIVE
NRBC # BLD AUTO: 0 10*3/UL
NRBC BLD AUTO-RTO: 0 /100
PH UR STRIP: 6.5 PH (ref 4.7–8)
PLATELET # BLD AUTO: 179 10E9/L (ref 150–450)
POTASSIUM SERPL-SCNC: 3.6 MMOL/L (ref 3.4–5.3)
PROT SERPL-MCNC: 7.5 G/DL (ref 6.8–8.8)
RBC # BLD AUTO: 4.55 10E12/L (ref 3.8–5.2)
SODIUM SERPL-SCNC: 139 MMOL/L (ref 133–144)
SOURCE: NORMAL
SP GR UR STRIP: 1 (ref 1–1.03)
UROBILINOGEN UR STRIP-MCNC: NORMAL MG/DL (ref 0–2)
WBC # BLD AUTO: 4.6 10E9/L (ref 4–11)

## 2018-02-17 PROCEDURE — 25000128 H RX IP 250 OP 636: Performed by: PHYSICIAN ASSISTANT

## 2018-02-17 PROCEDURE — 96361 HYDRATE IV INFUSION ADD-ON: CPT

## 2018-02-17 PROCEDURE — 96374 THER/PROPH/DIAG INJ IV PUSH: CPT

## 2018-02-17 PROCEDURE — 86140 C-REACTIVE PROTEIN: CPT | Performed by: PHYSICIAN ASSISTANT

## 2018-02-17 PROCEDURE — 99284 EMERGENCY DEPT VISIT MOD MDM: CPT | Performed by: PHYSICIAN ASSISTANT

## 2018-02-17 PROCEDURE — 85025 COMPLETE CBC W/AUTO DIFF WBC: CPT | Performed by: PHYSICIAN ASSISTANT

## 2018-02-17 PROCEDURE — 99284 EMERGENCY DEPT VISIT MOD MDM: CPT | Mod: 25

## 2018-02-17 PROCEDURE — 74019 RADEX ABDOMEN 2 VIEWS: CPT | Mod: TC

## 2018-02-17 PROCEDURE — 36415 COLL VENOUS BLD VENIPUNCTURE: CPT | Performed by: PHYSICIAN ASSISTANT

## 2018-02-17 PROCEDURE — 80053 COMPREHEN METABOLIC PANEL: CPT | Performed by: PHYSICIAN ASSISTANT

## 2018-02-17 PROCEDURE — 81003 URINALYSIS AUTO W/O SCOPE: CPT | Performed by: PHYSICIAN ASSISTANT

## 2018-02-17 RX ORDER — ONDANSETRON 2 MG/ML
4 INJECTION INTRAMUSCULAR; INTRAVENOUS ONCE
Status: COMPLETED | OUTPATIENT
Start: 2018-02-17 | End: 2018-02-17

## 2018-02-17 RX ADMIN — SODIUM CHLORIDE 1000 ML: 9 INJECTION, SOLUTION INTRAVENOUS at 11:53

## 2018-02-17 RX ADMIN — ONDANSETRON 4 MG: 2 INJECTION INTRAMUSCULAR; INTRAVENOUS at 11:54

## 2018-02-17 ASSESSMENT — ENCOUNTER SYMPTOMS
ABDOMINAL DISTENTION: 0
APPETITE CHANGE: 0
CHILLS: 0
MUSCULOSKELETAL NEGATIVE: 1
DYSURIA: 0
UNEXPECTED WEIGHT CHANGE: 0
FLANK PAIN: 0
HEMATURIA: 0
NEUROLOGICAL NEGATIVE: 1
FREQUENCY: 0
DIFFICULTY URINATING: 0
ANAL BLEEDING: 0
CONSTIPATION: 0
FEVER: 0
NAUSEA: 1
SHORTNESS OF BREATH: 0
SORE THROAT: 0
DIARRHEA: 0
BLOOD IN STOOL: 0
VOMITING: 0
ABDOMINAL PAIN: 1

## 2018-02-17 NOTE — DISCHARGE INSTRUCTIONS
Please follow up with primary care in 3 days for re-check. If your pain persists, you may need additional imaging as an outpatient. Please return here sooner with any new or worsening symptoms.     Abdominal Pain    Abdominal pain is pain in the stomach or belly area. Everyone has this pain from time to time. In many cases it goes away on its own. But abdominal pain can sometimes be due to a serious problem, such as appendicitis. So it s important to know when to seek help.  Causes of abdominal pain  There are many possible causes of abdominal pain. Common causes in adults include:    Constipation, diarrhea, or gas    Stomach acid flowing back up into the esophagus (acid reflux or heartburn)    Severe acid reflux, called GERD (gastroesophageal reflux disease)    A sore in the lining of the stomach or small intestine (peptic ulcer)    Inflammation of the gallbladder, liver, or pancreas    Gallstones or kidney stones    Appendicitis     Intestinal blockage     An internal organ pushing through a muscle or other tissue (hernia)    Urinary tract infections    In women, menstrual cramps, fibroids, or endometriosis    Inflammation or infection of the intestines  Diagnosing the cause of abdominal pain  Your healthcare provider will do a physical exam help find the cause of your pain. If needed, tests will be ordered. Belly pain has many possible causes. So it can be hard to find the reason for your pain. Giving details about your pain can help. Tell your provider where and when you feel the pain, and what makes it better or worse. Also let your provider know if you have other symptoms such as:    Fever    Tiredness    Upset stomach (nausea)    Vomiting    Changes in bathroom habits  Treating abdominal pain  Some causes of pain need emergency medical treatment right away. These include appendicitis or a bowel blockage. Other problems can be treated with rest, fluids, or medicines. Your healthcare provider can give you  specific instructions for treatment or self-care based on what is causing your pain.  If you have vomiting or diarrhea, sip water or other clear fluids. When you are ready to eat solid foods again, start with small amounts of easy-to-digest, low-fat foods. These include apple sauce, toast, or crackers.   When to seek medical care  Call 911 or go to the hospital right away if you:    Can t pass stool and are vomiting    Are vomiting blood or have bloody diarrhea or black, tarry diarrhea    Have chest, neck, or shoulder pain    Feel like you might pass out    Have pain in your shoulder blades with nausea    Have sudden, severe belly pain    Have new, severe pain unlike any you have felt before    Have a belly that is rigid, hard, and tender to touch  Call your healthcare provider if you have:    Pain for more than 5 days    Bloating for more than 2 days    Diarrhea for more than 5 days    A fever of 100.4 F (38.0 C) or higher, or as directed by your provider    Pain that gets worse    Weight loss for no reason    Continued lack of appetite    Blood in your stool  How to prevent abdominal pain  Here are some tips to help prevent abdominal pain:    Eat smaller amounts of food at one time.    Avoid greasy, fried, or other high-fat foods.    Avoid foods that give you gas.    Exercise regularly.    Drink plenty of fluids.  To help prevent GERD symptoms:    Quit smoking.    Reduce alcohol and certain foods that increase stomach acid.    Avoid aspirin and over-the-counter pain and fever medicines (NSAIDS or nonsteroidal anti-inflammatory drugs), if possible    Lose extra weight.    Finish eating at least 2 hours before you go to bed or lie down.    Raise the head of your bed.  Date Last Reviewed: 7/1/2016 2000-2017 [employer]. [address] [place] [address], [place] [address]. All rights reserved. This information is not intended as a substitute for professional medical care. Always follow your healthcare  professional's instructions.

## 2018-02-17 NOTE — ED NOTES
Pt in for complaints of RLQ abdominal pain and nausea. Reports sx have been ongoing for the last month but were more in the lower abdomen initially. States intermittent nausea as well. Pt states she was in to see her PCP recently but did not have the sx the day she was in so did not discuss at that time. Pt up to BR attempting to provide urine sample at this time. Awaiting provider eval.

## 2018-02-17 NOTE — ED PROVIDER NOTES
History     Chief Complaint   Patient presents with     Abdominal Pain     radiates to right groin, intermittent since begining of the month     Nausea     HPI  Patrizia Delcid is a 57 year old female who presents with RLQ abdominal pain x 1 month, worsening over the last 3 days. Pain radiates to her right groin. Pain was improved with walking around initiallly, but has become constant the last few days. Denies hematuria or dysuria. Denies v/d or fevers/chills. Has been slightly nauseous. Denies black/bloody stools. Still has her appendix. H/o diverticulitis.     Problem List:    Patient Active Problem List    Diagnosis Date Noted     Elevated rheumatoid factor 11/13/2017     Priority: Medium     Vitamin D deficiency disease 11/13/2017     Priority: Medium     Primary osteoarthritis of right shoulder 09/07/2016     Priority: Medium     ACP (advance care planning) 04/06/2016     Priority: Medium     Advance Care Planning 4/6/2016: ACP Review of Chart / Resources Provided:  Reviewed chart for advance care plan.  Patrizia Delcid has been provided information and resources to begin or update their advance care plan.  Added by Maritza Manuel                Past Medical History:    Past Medical History:   Diagnosis Date     Diverticulitis of colon (without mention of hemorrhage)(562.11) 12/08/2010     Endometriosis 09/12/2011     Fibrocystic change of Breast 09/12/2011     Gilbert Disease 09/12/2011     Herpes zoster without mention of complication 12/08/2010     Hormone replacement therapy (postmenopausal) 12/08/2010     Primary osteoarthritis of right shoulder 9/7/2016     Symptomatic states associated with artificial menopause 12/08/2010       Past Surgical History:    Past Surgical History:   Procedure Laterality Date     BIOPSY      breast biopsy     CHOLECYSTECTOMY       COLONOSCOPY  02/17/2011     GYN SURGERY      hysterectomy     LEFT LUMPECTOMY      Benign     salpingo-oopherectomy bilateral    Patient called about INR results,had seen KENYON Awan 8- and has already increased warfarin to 1.5 tabs on T,W,TH and Sat and 1 tab all other days,has not missed a dose.       Family History:    Family History   Problem Relation Age of Onset     Myocardial Infarction Mother      C.A.D. Mother      HEART DISEASE Father      CANCER Father      LUNG     C.A.D. Father      CANCER Brother      LUNG     Myocardial Infarction Other      MYOCARDIAL INFARCTION     CANCER Brother      TESTICULAR     Breast Cancer Other      Myocardial Infarction Other      MYOCARDIAL INFARCTION       Social History:  Marital Status:   [2]  Social History   Substance Use Topics     Smoking status: Former Smoker     Types: Cigarettes     Smokeless tobacco: Never Used      Comment: Tried to Quit (YES); YR QUIT (1980); Passive Exposure (NO)     Alcohol use 0.0 oz/week      Comment: RARELY        Medications:      triamcinolone (KENALOG) 0.1 % cream   valACYclovir (VALTREX) 1000 mg tablet   cholecalciferol (VITAMIN D3) 07442 UNITS capsule   carisoprodol (SOMA) 350 MG tablet   cetirizine (ZYRTEC ALLERGY) 10 MG tablet         Review of Systems   Constitutional: Negative for appetite change, chills, fever and unexpected weight change.   HENT: Negative for sore throat.    Respiratory: Negative for shortness of breath.    Cardiovascular: Negative for chest pain.   Gastrointestinal: Positive for abdominal pain and nausea. Negative for abdominal distention, anal bleeding, blood in stool, constipation, diarrhea and vomiting.   Genitourinary: Negative.  Negative for difficulty urinating, dyspareunia, dysuria, flank pain, frequency, genital sores, hematuria, pelvic pain, urgency, vaginal bleeding, vaginal discharge and vaginal pain.   Musculoskeletal: Negative.    Skin: Negative.    Neurological: Negative.    All other systems reviewed and are negative.      Physical Exam   BP: 129/66  Pulse: 75  Temp: 97.3  F (36.3  C)  Resp: 16  SpO2: 96 %      Physical Exam   Constitutional: She is oriented to person, place, and time. Vital signs are normal. She appears well-developed and well-nourished.  Non-toxic appearance. She  does not have a sickly appearance. She does not appear ill. No distress.   HENT:   Head: Normocephalic and atraumatic.   Right Ear: External ear normal.   Left Ear: External ear normal.   Nose: Nose normal.   Mouth/Throat: Oropharynx is clear and moist. No oropharyngeal exudate.   Eyes: Conjunctivae are normal. Pupils are equal, round, and reactive to light. Right eye exhibits no discharge. Left eye exhibits no discharge. No scleral icterus.   Neck: Normal range of motion. Neck supple.   Cardiovascular: Normal rate, regular rhythm, normal heart sounds and intact distal pulses.  Exam reveals no gallop and no friction rub.    No murmur heard.  Pulmonary/Chest: Effort normal and breath sounds normal. No respiratory distress. She has no wheezes. She has no rales. She exhibits no tenderness.   Abdominal: Soft. Bowel sounds are normal. She exhibits no distension and no mass. There is tenderness in the right lower quadrant. There is no rebound and no guarding.   Musculoskeletal: Normal range of motion. She exhibits no edema.   Lymphadenopathy:     She has no cervical adenopathy.   Neurological: She is alert and oriented to person, place, and time. No cranial nerve deficit.   Skin: Skin is warm and dry. No rash noted. She is not diaphoretic. No erythema. No pallor.   Psychiatric: She has a normal mood and affect. Her behavior is normal. Judgment and thought content normal.   Nursing note and vitals reviewed.      ED Course     ED Course     Procedures               Labs Ordered and Resulted from Time of ED Arrival Up to the Time of Departure from the ED   COMPREHENSIVE METABOLIC PANEL - Abnormal; Notable for the following:        Result Value    Bilirubin Total 2.0 (*)     All other components within normal limits   UA MACROSCOPIC WITH REFLEX TO MICRO AND CULTURE   CBC WITH PLATELETS DIFFERENTIAL   CRP INFLAMMATION   PERIPHERAL IV CATHETER     Results for orders placed or performed during the hospital encounter of 02/17/18    XR Abdomen 2 Views    Narrative    EXAM:XR ABDOMEN 2 VW     CLINICAL HISTORY: Patient Age  57 years Additional clinical info: RLQ  abd pain;     COMPARISON: None      TECHNIQUE: 3 views      Impression    IMPRESSION: Gas in normal caliber loops of large and small bowel. No  free air. No air-fluid levels on the upright film. Surgical clips in  the right upper quadrant. Degenerative arthritis lower lumbar spine.    AMINA LEE MD         Assessments & Plan (with Medical Decision Making)   Patrizia is in NAD and is non-toxic. She has right sided abdominal pain w/o peritoneal signs. Labs are completely unremarkable making any acute abdominal infection or inflammation unlikely. Abd XR normal as well. Recommended she f/u with PCP if pain persists, as perhaps outpatient imaging such as US or CT would be warranted at that time. She was reassured by labs and discharged home in good condition following.     Plan: Please follow up with primary care in 3 days for re-check. If your pain persists, you may need additional imaging as an outpatient. Please return here sooner with any new or worsening symptoms.     I have reviewed the nursing notes.    I have reviewed the findings, diagnosis, plan and need for follow up with the patient.    Discharge Medication List as of 2/17/2018  1:41 PM          Final diagnoses:   Abdominal pain, right lower quadrant       2/17/2018   [facility]     Cata Lowe PA-C  02/17/18 [id number]

## 2018-02-17 NOTE — ED AVS SNAPSHOT
[facility]    [address]    [facility] [address]    Phone:  [phone]                                       Patrizia Delcid   MRN: [id number]    Department:  [facility]   Date of Visit:  2/17/2018           Patient Information     Date Of Birth          1960        Your diagnoses for this visit were:     Abdominal pain, right lower quadrant        You were seen by Cata Lowe PA-C.      Follow-up Information     Follow up with Toyin Puri PA In 3 days.    Specialty:  Family Practice    Contact information:    [facility]  [address]  [place] [address] [phone]          Follow up with [facility].    Specialty:  EMERGENCY MEDICINE    Why:  If symptoms worsen    Contact information:    [address]  [facility] [address] [phone]    Additional information:    From [facility]: Take [address]. Turn left at [address]. Turn left at the first stoplight on [address]. At the first stop sign, take a right onto [address]. Take a left into the parking lot and continue through until you reach the North enterance of the building.       From [place]: Take [address]. Take the [address] ramp towards [place]. Turn left onto [address]. Take a slight right onto [address]. Turn left at the first stoplight on [address]. At the first stop sign, take a right onto [address]. Take a left into the parking lot and continue through until you reach the North enterance of the building.       From [place]: Take [address]. Take a right at [address]. At the first stop sign, take a right onto [address]. Take a left into the parking lot and continue through until you reach the North enterance of the building.         Discharge Instructions       Please follow up with primary care in 3 days for re-check. If your pain persists, you may  need additional imaging as an outpatient. Please return here sooner with any new or worsening symptoms.     Abdominal Pain    Abdominal pain is pain in the stomach or belly area. Everyone has this pain from time to time. In many cases it goes away on its own. But abdominal pain can sometimes be due to a serious problem, such as appendicitis. So it s important to know when to seek help.  Causes of abdominal pain  There are many possible causes of abdominal pain. Common causes in adults include:    Constipation, diarrhea, or gas    Stomach acid flowing back up into the esophagus (acid reflux or heartburn)    Severe acid reflux, called GERD (gastroesophageal reflux disease)    A sore in the lining of the stomach or small intestine (peptic ulcer)    Inflammation of the gallbladder, liver, or pancreas    Gallstones or kidney stones    Appendicitis     Intestinal blockage     An internal organ pushing through a muscle or other tissue (hernia)    Urinary tract infections    In women, menstrual cramps, fibroids, or endometriosis    Inflammation or infection of the intestines  Diagnosing the cause of abdominal pain  Your healthcare provider will do a physical exam help find the cause of your pain. If needed, tests will be ordered. Belly pain has many possible causes. So it can be hard to find the reason for your pain. Giving details about your pain can help. Tell your provider where and when you feel the pain, and what makes it better or worse. Also let your provider know if you have other symptoms such as:    Fever    Tiredness    Upset stomach (nausea)    Vomiting    Changes in bathroom habits  Treating abdominal pain  Some causes of pain need emergency medical treatment right away. These include appendicitis or a bowel blockage. Other problems can be treated with rest, fluids, or medicines. Your healthcare provider can give you specific instructions for treatment or self-care based on what is causing your pain.  If you  have vomiting or diarrhea, sip water or other clear fluids. When you are ready to eat solid foods again, start with small amounts of easy-to-digest, low-fat foods. These include apple sauce, toast, or crackers.   When to seek medical care  Call 911 or go to the hospital right away if you:    Can t pass stool and are vomiting    Are vomiting blood or have bloody diarrhea or black, tarry diarrhea    Have chest, neck, or shoulder pain    Feel like you might pass out    Have pain in your shoulder blades with nausea    Have sudden, severe belly pain    Have new, severe pain unlike any you have felt before    Have a belly that is rigid, hard, and tender to touch  Call your healthcare provider if you have:    Pain for more than 5 days    Bloating for more than 2 days    Diarrhea for more than 5 days    A fever of 100.4 F (38.0 C) or higher, or as directed by your provider    Pain that gets worse    Weight loss for no reason    Continued lack of appetite    Blood in your stool  How to prevent abdominal pain  Here are some tips to help prevent abdominal pain:    Eat smaller amounts of food at one time.    Avoid greasy, fried, or other high-fat foods.    Avoid foods that give you gas.    Exercise regularly.    Drink plenty of fluids.  To help prevent GERD symptoms:    Quit smoking.    Reduce alcohol and certain foods that increase stomach acid.    Avoid aspirin and over-the-counter pain and fever medicines (NSAIDS or nonsteroidal anti-inflammatory drugs), if possible    Lose extra weight.    Finish eating at least 2 hours before you go to bed or lie down.    Raise the head of your bed.  Date Last Reviewed: 7/1/2016 2000-2017 [employer]. [address] [place] [address], [place] [address]. All rights reserved. This information is not intended as a substitute for professional medical care. Always follow your healthcare professional's instructions.             Review of your medicines      Our records show that you are  taking the medicines listed below. If these are incorrect, please call your family doctor or clinic.        Dose / Directions Last dose taken    carisoprodol 350 MG tablet   Commonly known as:  SOMA   Dose:  350 mg   Quantity:  30 tablet        Take 1 tablet (350 mg) by mouth 3 times daily as needed for muscle spasms   Refills:  0        cholecalciferol 84248 UNITS capsule   Commonly known as:  VITAMIN D3   Dose:  1 capsule   Quantity:  12 capsule        Take 1 capsule (50,000 Units) by mouth once a week   Refills:  1        triamcinolone 0.1 % cream   Commonly known as:  KENALOG   Quantity:  80 g        Apply sparingly to affected area three times daily as needed   Refills:  0        valACYclovir 1000 mg tablet   Commonly known as:  VALTREX   Quantity:  21 tablet        TAKE 1 TABLET BY MOUTH 3 TIMES DAILY   Refills:  1        ZYRTEC ALLERGY 10 MG tablet   Dose:  10 mg   Generic drug:  cetirizine        Take 10 mg by mouth daily as needed.   Refills:  0                Procedures and tests performed during your visit     CBC with platelets differential    CRP inflammation    Comprehensive metabolic panel    Peripheral IV catheter    UA reflex to Microscopic and Culture    XR Abdomen 2 Views      Orders Needing Specimen Collection     None      Pending Results     No orders found from 2/15/2018 to 2/18/2018.            Pending Culture Results     No orders found from 2/15/2018 to 2/18/2018.            Thank you for choosing [facility]       Thank you for choosing [facility] for your care. Our goal is always to provide you with excellent care. Hearing back from our patients is one way we can continue to improve our services. Please take a few minutes to complete the written survey that you may receive in the mail after you visit with us. Thank you!        [facility] [employer] Information     [employer] gives you secure access to your electronic health record. If you see a primary care provider, you can also send messages to your care  team and make appointments. If you have questions, please call your primary care clinic.  If you do not have a primary care provider, please call [phone] and they will assist you.        Care EveryWhere ID     This is your Care EveryWhere ID. This could be used by other organizations to access your [facility] medical records  [id number]        Equal Access to Services     BENNETT MILLER : Ibrahima Figueroa, jairo dumont, deng meraz. So [facility] [phone].    ATENCIÓN: Si habla español, tiene a talbert disposición servicios gratuitos de asistencia lingüística. Llame al [phone].    We comply with applicable federal civil rights laws and [place] laws. We do not discriminate on the basis of race, color, national origin, age, disability, sex, sexual orientation, or gender identity.            After Visit Summary       This is your record. Keep this with you and show to your community pharmacist(s) and doctor(s) at your next visit.

## 2018-02-17 NOTE — ED AVS SNAPSHOT
[facility]    [address] [address]    Phone:  [phone]                                       Patrizia Delcid   MRN: [id number]    Department:  [facility]   Date of Visit:  2/17/2018           After Visit Summary Signature Page     I have received my discharge instructions, and my questions have been answered. I have discussed any challenges I see with this plan with the nurse or doctor.    ..........................................................................................................................................  Patient/Patient Representative Signature      ..........................................................................................................................................  Patient Representative Print Name and Relationship to Patient    ..................................................               ................................................  Date                                            Time    ..........................................................................................................................................  Reviewed by Signature/Title    ...................................................              ..............................................  Date                                                            Time

## 2018-03-03 DIAGNOSIS — E55.9 VITAMIN D DEFICIENCY DISEASE: ICD-10-CM

## 2018-03-05 DIAGNOSIS — E55.9 VITAMIN D DEFICIENCY DISEASE: ICD-10-CM

## 2018-03-05 PROCEDURE — 36415 COLL VENOUS BLD VENIPUNCTURE: CPT | Performed by: PHYSICIAN ASSISTANT

## 2018-03-05 PROCEDURE — 82306 VITAMIN D 25 HYDROXY: CPT | Mod: 90 | Performed by: PHYSICIAN ASSISTANT

## 2018-03-05 PROCEDURE — 99000 SPECIMEN HANDLING OFFICE-LAB: CPT | Performed by: PHYSICIAN ASSISTANT

## 2018-03-05 RX ORDER — METHOCARBAMOL 750 MG/1
TABLET ORAL
Qty: 12 CAPSULE | Refills: 0 | Status: SHIPPED | OUTPATIENT
Start: 2018-03-05 | End: 2019-01-03

## 2018-03-07 LAB — DEPRECATED CALCIDIOL+CALCIFEROL SERPL-MC: 58 UG/L (ref 20–75)

## 2018-06-09 ENCOUNTER — HOSPITAL ENCOUNTER (EMERGENCY)
Facility: HOSPITAL | Age: 58
Discharge: HOME | End: 2018-06-09
Attending: PHYSICIAN ASSISTANT | Admitting: PHYSICIAN ASSISTANT
Payer: COMMERCIAL

## 2018-06-09 VITALS
TEMPERATURE: 97 F | OXYGEN SATURATION: 97 % | DIASTOLIC BLOOD PRESSURE: 49 MMHG | SYSTOLIC BLOOD PRESSURE: 119 MMHG | RESPIRATION RATE: 16 BRPM | HEART RATE: 74 BPM

## 2018-06-09 DIAGNOSIS — Z13.9 SCREENING FOR CONDITION: ICD-10-CM

## 2018-06-09 DIAGNOSIS — N95.2 ATROPHIC VAGINITIS: ICD-10-CM

## 2018-06-09 LAB
ALBUMIN UR-MCNC: NEGATIVE MG/DL
APPEARANCE UR: CLEAR
BILIRUB UR QL STRIP: NEGATIVE
COLOR UR AUTO: NORMAL
GLUCOSE UR STRIP-MCNC: NEGATIVE MG/DL
HGB UR QL STRIP: NEGATIVE
KETONES UR STRIP-MCNC: NEGATIVE MG/DL
LEUKOCYTE ESTERASE UR QL STRIP: NEGATIVE
NITRATE UR QL: NEGATIVE
PH UR STRIP: 6.5 PH (ref 4.7–8)
SOURCE: NORMAL
SP GR UR STRIP: 1.01 (ref 1–1.03)
UROBILINOGEN UR STRIP-MCNC: NORMAL MG/DL (ref 0–2)

## 2018-06-09 PROCEDURE — 81003 URINALYSIS AUTO W/O SCOPE: CPT | Performed by: PHYSICIAN ASSISTANT

## 2018-06-09 PROCEDURE — G0463 HOSPITAL OUTPT CLINIC VISIT: HCPCS

## 2018-06-09 PROCEDURE — 99213 OFFICE O/P EST LOW 20 MIN: CPT | Performed by: PHYSICIAN ASSISTANT

## 2018-06-09 NOTE — ED AVS SNAPSHOT
[facility]    [address] [address]    Phone:  [phone]                                       Patrizia Delcid   MRN: [id number]    Department:  [facility]   Date of Visit:  6/9/2018           Patient Information     Date Of Birth          1960        Your diagnoses for this visit were:     Atrophic vaginitis     Screening for condition UA normal       You were seen by Swathi Lance PA.      Follow-up Information     Please follow up.    Why:  If symptoms worsen, with your GYN specialist        Follow up with [facility].    Specialty:  EMERGENCY MEDICINE    Why:  If further concerns develop    Contact information:    [address] [address] [phone]    Additional information:    From [facility]: Take [address]. Turn left at [address]. Turn left at the first stoplight on [address]. At the first stop sign, take a right onto [address]. Take a left into the parking lot and continue through until you reach the North enterance of the building.       From [place]: Take [address]. Take the [address] ramp towards [place]. Turn left onto [address]. Take a slight right onto [address]. Turn left at the first stoplight on [address]. At the first stop sign, take a right onto [address]. Take a left into the parking lot and continue through until you reach the North enterance of the building.       From [place]: Take [address]. Take a right at [address]. At the first stop sign, take a right onto [address]. Take a left into the parking lot and continue through until you reach the North enterance of the building.       Discharge References/Attachments     VAGINITIS, ATROPHIC (ENGLISH)         Review of your medicines      START taking        Dose / Directions Last dose taken    conjugated estrogens cream   Commonly known as:   PREMARIN   Quantity:  30 g        Insert/apply Finger tip full nighlty for 1 week then once a week as needed   Refills:  1          Our records show that you are taking the medicines listed below. If these are incorrect, please call your family doctor or clinic.        Dose / Directions Last dose taken    carisoprodol 350 MG tablet   Commonly known as:  SOMA   Dose:  350 mg   Quantity:  30 tablet        Take 1 tablet (350 mg) by mouth 3 times daily as needed for muscle spasms   Refills:  0        D3-50 06191 units capsule   Quantity:  12 capsule   Generic drug:  cholecalciferol        TAKE 1 CAPSULE BY MOUTH ONCE WEEKLY   Refills:  0        triamcinolone 0.1 % cream   Commonly known as:  KENALOG   Quantity:  80 g        Apply sparingly to affected area three times daily as needed   Refills:  0        valACYclovir 1000 mg tablet   Commonly known as:  VALTREX   Quantity:  21 tablet        TAKE 1 TABLET BY MOUTH 3 TIMES DAILY   Refills:  1        ZYRTEC ALLERGY 10 MG tablet   Dose:  10 mg   Generic drug:  cetirizine        Take 10 mg by mouth daily as needed.   Refills:  0                Prescriptions were sent or printed at these locations (1 Prescription)                   [facility]  [address]   [address] [place] [address]    Telephone:  [phone]   Fax:  [phone]   Hours:                  E-Prescribed (1 of 1)         conjugated estrogens (PREMARIN) cream                Procedures and tests performed during your visit     UA reflex to Microscopic and Culture      Orders Needing Specimen Collection     None      Pending Results     No orders found from 6/7/2018 to 6/10/2018.            Pending Culture Results     No orders found from 6/7/2018 to 6/10/2018.            Thank you for choosing [facility]       Thank you for choosing [facility] for your care. Our goal is always to provide you with excellent care. Hearing back from our patients is one way we can continue to  improve our services. Please take a few minutes to complete the written survey that you may receive in the mail after you visit with us. Thank you!        [email or web address] Information     [employer] gives you secure access to your electronic health record. If you see a primary care provider, you can also send messages to your care team and make appointments. If you have questions, please call your primary care clinic.  If you do not have a primary care provider, please call [phone] and they will assist you.        Care EveryWhere ID     This is your Care EveryWhere ID. This could be used by other organizations to access your [place] medical records  [id number]        Equal Access to Services     BENNETT MILLER : Ibrahima Figueroa, jairo dumont, deng meraz. So [facility] [phone].    ATENCIÓN: Si habla español, tiene a talbert disposición servicios gratuitos de asistencia lingüística. Llame al [phone].    We comply with applicable federal civil rights laws and [place] laws. We do not discriminate on the basis of race, color, national origin, age, disability, sex, sexual orientation, or gender identity.            After Visit Summary       This is your record. Keep this with you and show to your community pharmacist(s) and doctor(s) at your next visit.

## 2018-06-09 NOTE — ED AVS SNAPSHOT
[facility]    [address] [address]    Phone:  [phone]                                       Patrizia Delcid   MRN: [id number]    Department:  [facility]   Date of Visit:  6/9/2018           After Visit Summary Signature Page     I have received my discharge instructions, and my questions have been answered. I have discussed any challenges I see with this plan with the nurse or doctor.    ..........................................................................................................................................  Patient/Patient Representative Signature      ..........................................................................................................................................  Patient Representative Print Name and Relationship to Patient    ..................................................               ................................................  Date                                            Time    ..........................................................................................................................................  Reviewed by Signature/Title    ...................................................              ..............................................  Date                                                            Time

## 2018-06-10 ASSESSMENT — ENCOUNTER SYMPTOMS
DYSURIA: 1
FEVER: 0
FREQUENCY: 1
CARDIOVASCULAR NEGATIVE: 1
NEUROLOGICAL NEGATIVE: 1
BACK PAIN: 1
FATIGUE: 0
RESPIRATORY NEGATIVE: 1
HEMATURIA: 0
FLANK PAIN: 0
NAUSEA: 0
PSYCHIATRIC NEGATIVE: 1
VOMITING: 0
APPETITE CHANGE: 0

## 2018-06-11 NOTE — ED PROVIDER NOTES
History     Chief Complaint   Patient presents with     Rule out Urinary Tract Infection     burning and frequency     The history is provided by the patient. No  was used.     Patrizia Delcid is a 58 year old female who has 2 days of burning with urination. No n/v/d/f/c. No LBP. No fever/fatigue. Pt worried she has a bladder infection. She has gone through menopause.    Problem List:    Patient Active Problem List    Diagnosis Date Noted     Elevated rheumatoid factor 11/13/2017     Priority: Medium     Vitamin D deficiency disease 11/13/2017     Priority: Medium     Primary osteoarthritis of right shoulder 09/07/2016     Priority: Medium     ACP (advance care planning) 04/06/2016     Priority: Medium     Advance Care Planning 4/6/2016: ACP Review of Chart / Resources Provided:  Reviewed chart for advance care plan.  Patrizia Delcid has been provided information and resources to begin or update their advance care plan.  Added by Maritza Manuel                Past Medical History:    Past Medical History:   Diagnosis Date     Diverticulitis of colon (without mention of hemorrhage)(562.11) 12/08/2010     Endometriosis 09/12/2011     Fibrocystic change of Breast 09/12/2011     Gilbert Disease 09/12/2011     Herpes zoster without mention of complication 12/08/2010     Hormone replacement therapy (postmenopausal) 12/08/2010     Primary osteoarthritis of right shoulder 9/7/2016     Symptomatic states associated with artificial menopause 12/08/2010       Past Surgical History:    Past Surgical History:   Procedure Laterality Date     BIOPSY      breast biopsy     CHOLECYSTECTOMY       COLONOSCOPY  02/17/2011     GYN SURGERY      hysterectomy     LEFT LUMPECTOMY      Benign     salpingo-oopherectomy bilateral         Family History:    Family History   Problem Relation Age of Onset     Myocardial Infarction Mother      C.A.D. Mother      HEART DISEASE Father      CANCER Father      LUNG      C.A.D. Father      CANCER Brother      LUNG     Myocardial Infarction Other      MYOCARDIAL INFARCTION     CANCER Brother      TESTICULAR     Breast Cancer Other      Myocardial Infarction Other      MYOCARDIAL INFARCTION       Social History:  Marital Status:   [2]  Social History   Substance Use Topics     Smoking status: Former Smoker     Types: Cigarettes     Smokeless tobacco: Never Used      Comment: Tried to Quit (YES); YR QUIT (1980); Passive Exposure (NO)     Alcohol use 0.0 oz/week      Comment: RARELY        Medications:      cetirizine (ZYRTEC ALLERGY) 10 MG tablet   conjugated estrogens (PREMARIN) cream   D3-50 97203 UNITS capsule   carisoprodol (SOMA) 350 MG tablet   triamcinolone (KENALOG) 0.1 % cream   valACYclovir (VALTREX) 1000 mg tablet         Review of Systems   Constitutional: Negative for appetite change, fatigue and fever.   Respiratory: Negative.    Cardiovascular: Negative.    Gastrointestinal: Negative for nausea and vomiting.   Genitourinary: Positive for dysuria, frequency and urgency. Negative for flank pain and hematuria.   Musculoskeletal: Positive for back pain.   Neurological: Negative.    Psychiatric/Behavioral: Negative.        Physical Exam   BP: (!) 119/49  Pulse: 74  Temp: 97  F (36.1  C)  Resp: 16  SpO2: 97 %      Physical Exam   Constitutional: She is oriented to person, place, and time. She appears well-developed and well-nourished. No distress.   Cardiovascular: Normal rate, regular rhythm and normal heart sounds.    Pulmonary/Chest: Effort normal and breath sounds normal. No respiratory distress.   Abdominal: Soft. Bowel sounds are normal. She exhibits no distension. There is no tenderness.   Neurological: She is alert and oriented to person, place, and time.   Skin: She is not diaphoretic.   Psychiatric: She has a normal mood and affect.   Nursing note and vitals reviewed.      ED Course     ED Course     Procedures          Assessments & Plan (with Medical  Decision Making)     I have reviewed the nursing notes.    I have reviewed the findings, diagnosis, plan and need for follow up with the patient.      Discharge Medication List as of 6/9/2018 10:28 AM      START taking these medications    Details   conjugated estrogens (PREMARIN) cream Insert/apply Finger tip full nighlty for 1 week then once a week as needed, Disp-30 g, R-1, E-Prescribe0.5g cream (as measured with package applicator) = 0.3125mg conj. estrogens             Final diagnoses:   Atrophic vaginitis   Screening for condition - UA normal         The pt beth not wish for me to examine her genital area.  Considering the UA was normal, I am concerned the vagina may be irritated due to atrophy.    Patient verbally educated and given appropriate education sheets for the diagnoses and has no questions.  Take medications as directed.   Follow up with your GYN provider if symptoms increase or if not resolving in next 2 weeks.   if further concerns develop, return to the ER  Swathi Lance Certified  Physician Assistant  6/10/2018  8:40 PM  URGENT CARE CLINIC      6/9/2018   [facility]     Swathi Lance PA  06/10/18 [id number]

## 2018-09-26 ENCOUNTER — MYC MEDICAL ADVICE (OUTPATIENT)
Dept: FAMILY MEDICINE | Facility: OTHER | Age: 58
End: 2018-09-26

## 2018-09-26 DIAGNOSIS — M19.011 PRIMARY OSTEOARTHRITIS OF RIGHT SHOULDER: Primary | ICD-10-CM

## 2018-09-26 NOTE — TELEPHONE ENCOUNTER
Pt wondering if we can place a referral for her to see Dr. [place] again in regards to her right shoulder or if you'd need to see her first. She saw him in 2016 and had injection at that time. Referral pended

## 2018-10-11 ENCOUNTER — TRANSFERRED RECORDS (OUTPATIENT)
Dept: HEALTH INFORMATION MANAGEMENT | Facility: CLINIC | Age: 58
End: 2018-10-11

## 2018-10-17 ENCOUNTER — TELEPHONE (OUTPATIENT)
Dept: FAMILY MEDICINE | Facility: OTHER | Age: 58
End: 2018-10-17

## 2018-10-17 NOTE — TELEPHONE ENCOUNTER
Pt believes she is having a reaction to her cortisone injection. States her back and hips are hurting very bad and this happened last time she got the injection. Please advise

## 2018-10-17 NOTE — PROGRESS NOTES
SUBJECTIVE:   Patrizia Delcid is a 58 year old female who presents to clinic today for the following health issues:      Cortisone shot reaction       Duration: pt had cortisone shot Thursday;  Pain started Saturday with patient not feeling well.  Sunday it was worse and Monday unbearable.    Description (location/character/radiation): pain in both hips and lower back    Intensity:  moderate    Accompanying signs and symptoms: n/a    History (similar episodes/previous evaluation): Did have the same reaction last year after getting the flu shot.  Have not gotten the flu shot yet this year    Precipitating or alleviating factors: None    Therapies tried and outcome: None           Problem list and histories reviewed & adjusted, as indicated.  Additional history: as documented    Patient Active Problem List   Diagnosis     ACP (advance care planning)     Primary osteoarthritis of right shoulder     Elevated rheumatoid factor     Vitamin D deficiency disease     Past Surgical History:   Procedure Laterality Date     BIOPSY      breast biopsy     CHOLECYSTECTOMY       COLONOSCOPY  02/17/2011     HYSTERECTOMY TOTAL ABDOMINAL, BILATERAL SALPINGO-OOPHORECTOMY, COMBINED N/A     vaginal vs abdominal hyst?     LEFT LUMPECTOMY      Benign       Social History   Substance Use Topics     Smoking status: Former Smoker     Types: Cigarettes     Smokeless tobacco: Never Used      Comment: Tried to Quit (YES); YR QUIT (1980); Passive Exposure (NO)     Alcohol use 0.0 oz/week      Comment: RARELY     Family History   Problem Relation Age of Onset     Myocardial Infarction Mother      C.A.D. Mother      HEART DISEASE Father      Cancer Father      LUNG     C.A.D. Father      Cancer Brother      LUNG     Myocardial Infarction Other      MYOCARDIAL INFARCTION     Cancer Brother      TESTICULAR     Breast Cancer Other      Myocardial Infarction Other      MYOCARDIAL INFARCTION         Current Outpatient Prescriptions   Medication Sig  "Dispense Refill     cetirizine (ZYRTEC ALLERGY) 10 MG tablet Take 10 mg by mouth daily as needed.       D3-50 90278 UNITS capsule TAKE 1 CAPSULE BY MOUTH ONCE WEEKLY 12 capsule 0     conjugated estrogens (PREMARIN) cream Insert/apply Finger tip full nighlty for 1 week then once a week as needed (Patient not taking: Reported on 10/18/2018) 30 g 1     triamcinolone (KENALOG) 0.1 % cream Apply sparingly to affected area three times daily as needed (Patient not taking: Reported on 10/18/2018) 80 g 0     valACYclovir (VALTREX) 1000 mg tablet TAKE 1 TABLET BY MOUTH 3 TIMES DAILY (Patient not taking: Reported on 10/18/2018) 21 tablet 1     Allergies   Allergen Reactions     Codeine Nausea     Recent Labs   Lab Test  02/17/18   1158  02/01/18   1421  10/27/16   1608  12/28/13   1939  11/04/13   0822   LDL   --    --    --    --   150*   HDL   --    --    --    --   43   TRIG   --    --    --    --   154*   ALT  36   --   36  31  36   CR  0.77  0.77  0.73  0.94  0.94   GFRESTIMATED  77  77  82  62  62   GFRESTBLACK  >90  >90  >90  African American GFR Calc    75  75   POTASSIUM  3.6  3.8  4.0  3.6  4.7   TSH   --    --   1.37   --   2.21      BP Readings from Last 3 Encounters:   10/18/18 118/62   06/09/18 (!) 119/49   02/17/18 135/80    Wt Readings from Last 3 Encounters:   10/18/18 166 lb (75.3 kg)   02/01/18 166 lb (75.3 kg)   11/13/17 158 lb (71.7 kg)                    Reviewed and updated as needed this visit by clinical staff       Reviewed and updated as needed this visit by Provider         ROS:  Constitutional, neuro, ENT, endocrine, pulmonary, cardiac, gastrointestinal, genitourinary, musculoskeletal, integument and psychiatric systems are negative, except as otherwise noted.    OBJECTIVE:                                                    /62 (BP Location: Left arm, Patient Position: Sitting, Cuff Size: Adult Large)  Pulse 77  Temp 98.2  F (36.8  C) (Tympanic)  Resp 16  Ht 5' 5\" (1.651 m)  Wt 166 lb " (75.3 kg)  SpO2 98%  BMI 27.62 kg/m2  Body mass index is 27.62 kg/(m^2).  GENERAL APPEARANCE: healthy, alert and no distress  EYES: Eyes grossly normal to inspection, PERRL and conjunctivae and sclerae normal  HENT: ear canals and TM's normal and nose and mouth without ulcers or lesions  NECK: no adenopathy, no asymmetry, masses, or scars and thyroid normal to palpation  RESP: lungs clear to auscultation - no rales, rhonchi or wheezes  CV: regular rates and rhythm, normal S1 S2, no S3 or S4 and no murmur, click or rub  LYMPHATICS: no cervical adenopathy  MS: extremities normal- no gross deformities noted  SKIN: no suspicious lesions or rashes  NEURO: Normal strength and tone, mentation intact and speech normal  PSYCH: mentation appears normal and affect normal/bright    Diagnostic test results:  Diagnostic Test Results:  Results for orders placed or performed in visit on 10/18/18   Vitamin D Deficiency   Result Value Ref Range    Vitamin D Deficiency screening 78 (H) 20 - 75 ug/L   CRP, inflammation   Result Value Ref Range    CRP Inflammation <2.9 0.0 - 8.0 mg/L   ESR: Erythrocyte sedimentation rate   Result Value Ref Range    Sed Rate 6 0 - 30 mm/h   CBC with platelets and differential   Result Value Ref Range    WBC 5.7 4.0 - 11.0 10e9/L    RBC Count 4.89 3.8 - 5.2 10e12/L    Hemoglobin 15.3 11.7 - 15.7 g/dL    Hematocrit 45.4 35.0 - 47.0 %    MCV 93 78 - 100 fl    MCH 31.3 26.5 - 33.0 pg    MCHC 33.7 31.5 - 36.5 g/dL    RDW 13.1 10.0 - 15.0 %    Platelet Count 231 150 - 450 10e9/L    Diff Method Automated Method     % Neutrophils 66.3 %    % Lymphocytes 25.1 %    % Monocytes 6.5 %    % Eosinophils 1.2 %    % Basophils 0.5 %    % Immature Granulocytes 0.4 %    Nucleated RBCs 0 0 /100    Absolute Neutrophil 3.8 1.6 - 8.3 10e9/L    Absolute Lymphocytes 1.4 0.8 - 5.3 10e9/L    Absolute Monocytes 0.4 0.0 - 1.3 10e9/L    Absolute Eosinophils 0.1 0.0 - 0.7 10e9/L    Absolute Basophils 0.0 0.0 - 0.2 10e9/L    Abs  Immature Granulocytes 0.0 0 - 0.4 10e9/L    Absolute Nucleated RBC 0.0    UA reflex to Microscopic and Culture - [place]   Result Value Ref Range    Color Urine Light Yellow     Appearance Urine Clear     Glucose Urine Negative NEG^Negative mg/dL    Bilirubin Urine Negative NEG^Negative    Ketones Urine Negative NEG^Negative mg/dL    Specific Gravity Urine 1.004 1.003 - 1.035    Blood Urine Negative NEG^Negative    pH Urine 6.5 4.7 - 8.0 pH    Protein Albumin Urine Negative NEG^Negative mg/dL    Urobilinogen mg/dL Normal 0.0 - 2.0 mg/dL    Nitrite Urine Negative NEG^Negative    Leukocyte Esterase Urine Negative NEG^Negative    Source Midstream Urine         ASSESSMENT/PLAN:                                                    1. Adverse effect of drug, initial encounter  She had a cortisone injection and feels off. Hives, redness and weakness. Pain in movement since this shot. Seems to be getting better now. Her labs are not giving us any clue as to where this is at. We will continue to monitor. No lesions to bx.   - CRP, inflammation; Future  - ESR: Erythrocyte sedimentation rate; Future  - CBC with platelets and differential; Future  - CRP, inflammation  - ESR: Erythrocyte sedimentation rate  - CBC with platelets and differential    2. Vitamin D deficiency disease  Recheck labs. Joint pain.   - Vitamin D Deficiency    3. Spasm of muscle of lower back  Refill her medications. Xray did not show anything new as well as hips.   - cyclobenzaprine (FLEXERIL) 10 MG tablet; Take 0.5-1 tablets (5-10 mg) by mouth 3 times daily as needed for muscle spasms  Dispense: 30 tablet; Refill: 1  - XR PELVIS 1/2 VW (Clinic Performed); Future    4. Bilateral hip pain  Normal xray. Bursa are tender. Consider PT ice heat .  - XR HIP RT G/E 2 VW (Clinic Performed); Future  - XR HIP LT G/E 2 VW (Clinic Performed); Future    5. Dysuria  Reassured there is no infection. No odor no disichare mild dryness. If sx are not getting better we will  then have her return.   - UA reflex to Microscopic and Culture - [place]  - fluconazole (DIFLUCAN) 150 MG tablet; Take 1 tablet (150 mg) by mouth every 3 days  Dispense: 2 tablet; Refill: 0      See Patient Instructions    MYRA Tsang  [facility] - [place]

## 2018-10-17 NOTE — TELEPHONE ENCOUNTER
To: Nurse to Toyin Puri  Patient thinks she is having an allergic reaction to an injection she had.  Please call her back at your earliest convenience.  Thank you

## 2018-10-18 ENCOUNTER — RADIANT APPOINTMENT (OUTPATIENT)
Dept: GENERAL RADIOLOGY | Facility: OTHER | Age: 58
End: 2018-10-18
Attending: PHYSICIAN ASSISTANT
Payer: COMMERCIAL

## 2018-10-18 ENCOUNTER — OFFICE VISIT (OUTPATIENT)
Dept: FAMILY MEDICINE | Facility: OTHER | Age: 58
End: 2018-10-18
Attending: PHYSICIAN ASSISTANT
Payer: COMMERCIAL

## 2018-10-18 VITALS
RESPIRATION RATE: 16 BRPM | OXYGEN SATURATION: 98 % | HEIGHT: 65 IN | SYSTOLIC BLOOD PRESSURE: 118 MMHG | HEART RATE: 77 BPM | WEIGHT: 166 LBS | DIASTOLIC BLOOD PRESSURE: 62 MMHG | BODY MASS INDEX: 27.66 KG/M2 | TEMPERATURE: 98.2 F

## 2018-10-18 DIAGNOSIS — M25.552 BILATERAL HIP PAIN: ICD-10-CM

## 2018-10-18 DIAGNOSIS — M25.551 BILATERAL HIP PAIN: ICD-10-CM

## 2018-10-18 DIAGNOSIS — M62.830 SPASM OF MUSCLE OF LOWER BACK: ICD-10-CM

## 2018-10-18 DIAGNOSIS — T50.905A ADVERSE EFFECT OF DRUG, INITIAL ENCOUNTER: Primary | ICD-10-CM

## 2018-10-18 DIAGNOSIS — E55.9 VITAMIN D DEFICIENCY DISEASE: ICD-10-CM

## 2018-10-18 DIAGNOSIS — R30.0 DYSURIA: ICD-10-CM

## 2018-10-18 LAB
ALBUMIN UR-MCNC: NEGATIVE MG/DL
APPEARANCE UR: CLEAR
BASOPHILS # BLD AUTO: 0 10E9/L (ref 0–0.2)
BASOPHILS NFR BLD AUTO: 0.5 %
BILIRUB UR QL STRIP: NEGATIVE
COLOR UR AUTO: NORMAL
CRP SERPL-MCNC: <2.9 MG/L (ref 0–8)
DIFFERENTIAL METHOD BLD: NORMAL
EOSINOPHIL # BLD AUTO: 0.1 10E9/L (ref 0–0.7)
EOSINOPHIL NFR BLD AUTO: 1.2 %
ERYTHROCYTE [DISTWIDTH] IN BLOOD BY AUTOMATED COUNT: 13.1 % (ref 10–15)
ERYTHROCYTE [SEDIMENTATION RATE] IN BLOOD BY WESTERGREN METHOD: 6 MM/H (ref 0–30)
GLUCOSE UR STRIP-MCNC: NEGATIVE MG/DL
HCT VFR BLD AUTO: 45.4 % (ref 35–47)
HGB BLD-MCNC: 15.3 G/DL (ref 11.7–15.7)
HGB UR QL STRIP: NEGATIVE
IMM GRANULOCYTES # BLD: 0 10E9/L (ref 0–0.4)
IMM GRANULOCYTES NFR BLD: 0.4 %
KETONES UR STRIP-MCNC: NEGATIVE MG/DL
LEUKOCYTE ESTERASE UR QL STRIP: NEGATIVE
LYMPHOCYTES # BLD AUTO: 1.4 10E9/L (ref 0.8–5.3)
LYMPHOCYTES NFR BLD AUTO: 25.1 %
MCH RBC QN AUTO: 31.3 PG (ref 26.5–33)
MCHC RBC AUTO-ENTMCNC: 33.7 G/DL (ref 31.5–36.5)
MCV RBC AUTO: 93 FL (ref 78–100)
MONOCYTES # BLD AUTO: 0.4 10E9/L (ref 0–1.3)
MONOCYTES NFR BLD AUTO: 6.5 %
NEUTROPHILS # BLD AUTO: 3.8 10E9/L (ref 1.6–8.3)
NEUTROPHILS NFR BLD AUTO: 66.3 %
NITRATE UR QL: NEGATIVE
NRBC # BLD AUTO: 0 10*3/UL
NRBC BLD AUTO-RTO: 0 /100
PH UR STRIP: 6.5 PH (ref 4.7–8)
PLATELET # BLD AUTO: 231 10E9/L (ref 150–450)
RBC # BLD AUTO: 4.89 10E12/L (ref 3.8–5.2)
SOURCE: NORMAL
SP GR UR STRIP: 1 (ref 1–1.03)
UROBILINOGEN UR STRIP-MCNC: NORMAL MG/DL (ref 0–2)
WBC # BLD AUTO: 5.7 10E9/L (ref 4–11)

## 2018-10-18 PROCEDURE — 73523 X-RAY EXAM HIPS BI 5/> VIEWS: CPT | Mod: TC

## 2018-10-18 PROCEDURE — 85652 RBC SED RATE AUTOMATED: CPT | Performed by: PHYSICIAN ASSISTANT

## 2018-10-18 PROCEDURE — 81003 URINALYSIS AUTO W/O SCOPE: CPT | Performed by: PHYSICIAN ASSISTANT

## 2018-10-18 PROCEDURE — 99000 SPECIMEN HANDLING OFFICE-LAB: CPT | Performed by: PHYSICIAN ASSISTANT

## 2018-10-18 PROCEDURE — 82306 VITAMIN D 25 HYDROXY: CPT | Mod: 90 | Performed by: PHYSICIAN ASSISTANT

## 2018-10-18 PROCEDURE — 99214 OFFICE O/P EST MOD 30 MIN: CPT | Performed by: PHYSICIAN ASSISTANT

## 2018-10-18 PROCEDURE — 36415 COLL VENOUS BLD VENIPUNCTURE: CPT | Performed by: PHYSICIAN ASSISTANT

## 2018-10-18 PROCEDURE — 85025 COMPLETE CBC W/AUTO DIFF WBC: CPT | Performed by: PHYSICIAN ASSISTANT

## 2018-10-18 PROCEDURE — 86140 C-REACTIVE PROTEIN: CPT | Performed by: PHYSICIAN ASSISTANT

## 2018-10-18 RX ORDER — FLUCONAZOLE 150 MG/1
150 TABLET ORAL
Qty: 2 TABLET | Refills: 0 | Status: SHIPPED | OUTPATIENT
Start: 2018-10-18 | End: 2019-01-03

## 2018-10-18 RX ORDER — CYCLOBENZAPRINE HCL 10 MG
5-10 TABLET ORAL 3 TIMES DAILY PRN
Qty: 30 TABLET | Refills: 1 | Status: SHIPPED | OUTPATIENT
Start: 2018-10-18 | End: 2020-12-09

## 2018-10-18 ASSESSMENT — ANXIETY QUESTIONNAIRES
3. WORRYING TOO MUCH ABOUT DIFFERENT THINGS: NOT AT ALL
2. NOT BEING ABLE TO STOP OR CONTROL WORRYING: NOT AT ALL
4. TROUBLE RELAXING: NOT AT ALL
1. FEELING NERVOUS, ANXIOUS, OR ON EDGE: NOT AT ALL
7. FEELING AFRAID AS IF SOMETHING AWFUL MIGHT HAPPEN: NOT AT ALL
6. BECOMING EASILY ANNOYED OR IRRITABLE: NOT AT ALL
IF YOU CHECKED OFF ANY PROBLEMS ON THIS QUESTIONNAIRE, HOW DIFFICULT HAVE THESE PROBLEMS MADE IT FOR YOU TO DO YOUR WORK, TAKE CARE OF THINGS AT HOME, OR GET ALONG WITH OTHER PEOPLE: NOT DIFFICULT AT ALL
GAD7 TOTAL SCORE: 0
5. BEING SO RESTLESS THAT IT IS HARD TO SIT STILL: NOT AT ALL

## 2018-10-18 ASSESSMENT — PAIN SCALES - GENERAL: PAINLEVEL: MODERATE PAIN (5)

## 2018-10-18 NOTE — MR AVS SNAPSHOT
After Visit Summary   10/18/2018    Patrizia Delcid    MRN: [id number]           Patient Information     Date Of Birth          1960        Visit Information        Provider Department      10/18/2018 10:40 AM Toyin Puri PA [facility] [place]        Today's Diagnoses     Adverse effect of drug, initial encounter    -  1    Vitamin D deficiency disease        Spasm of muscle of lower back        Bilateral hip pain        Dysuria          Care Instructions      Thank you for choosing [facility].   I have office hours 8:00 am to 4:30 pm on Monday's, Wednesday's, Thursday's and Friday's. My nurse and I are out of the office every Tuesday.    Following your visit, when your labs and diagnostic testing have returned, I will review then and you will be contacted by my nurse.  If you are on My Chart, you can also view results there.    For refills, notify your pharmacy regarding what you need and the pharmacy will generate a refill request. Do not call my nurse as she is unable to process refill request. Please plan ahead and allow 3-5 days for refill requests.    You will generally receive a reminder call the day prior to your appointment.  If you cannot attend your appointment, please cancel your appointment with as much notice as possible.  If there is a pattern of failure to present for your appointments, I cannot provide consistent, meaningful, ongoing care for you. It is very important to me that you come in for your care, so we can best assist you with your health care needs.    IMPORTANT:  Please note that it is my standard of practice to NOT participate in prescribing ongoing requested Narcotic Analgesic therapy, and/or participate in the prescribing of other controlled substances.  My nurse and I am happy to assist you with the process of referral for alternative pain management as needed, and other treatment modalities including but not limited to:  Physical  Therapy, Physical Medicine and Rehab, Counseling, Chiropractic Care, Orthopedic Care, and non-narcotic medication management.     In the event that you need to be seen for emergent concerns and I am out of office,  please see one of my colleagues for acute concerns.  You may also present to  or ER.  I appreciate the opportunity to serve you and look forward to supporting your healthcare needs in the future. Please contact me with any questions or concerns that you may have.    Sincerely,      Toyin Puri RN, PA-C               Follow-ups after your visit        Your next 10 appointments already scheduled     Oct 23, 2018  3:00 PM CDT   (Arrive by 2:45 PM)   MA SCREENING BILATERAL W/ BERE with HCMA1   [facility] ([facility] )    [address]  [place] [address]   [phone]           How do I prepare for my exam? (Food and drink instructions) No Food and Drink Restrictions.  How do I prepare for my exam? (Other instructions) Do not use any powder, lotion or deodorant under your arms or on your breast. If you do, we will ask you to remove it before your exam.  What should I wear: Wear comfortable, two-piece clothing.  How long does the exam take: Most scans will take 15 minutes.  What should I bring: Bring any previous mammograms from other facilities or have them mailed to the breast center.  Do I need a :  No  is needed.  What do I need to tell my doctor: If you have any allergies, tell your care team.  What should I do after the exam: No restrictions, You may resume normal activities.  What is this test: This test is an x-ray of the breast to look for breast disease. The breast is pressed between two plates to flatten and spread the tissue. An X-ray is taken of the breast from different angles.  Who should I call with questions: If you have any questions, please call the Imaging Department where you will have your exam. Directions, parking  "instructions, and other information is available on our website, [email or web address].  Other information about my exam Three-dimensional (3D) mammograms are available at [facility] locations in [facility], [place], [place], [facility], [place], and [place].  Health locations include [place] and the [facility] in [place].  Benefits of 3D mammograms include: * Improved rate of cancer detection * Decreases your chance of having to go back for more tests, which means fewer: * \"False-positive\" results (This means that there is an abnormal area but it isn't cancer.) * Invasive testing procedures, such as a biopsy or surgery * Can provide clearer images of the breast if you have dense breast tissue.  *3D mammography is an optional exam that anyone can have with a 2D mammogram. It doesn't replace or take the place of a 2D mammogram. 2D mammograms remain an effective screening test for all women.  Not all insurance companies cover the cost of a 3D mammogram. Check with your insurance.              Future tests that were ordered for you today     Open Future Orders        Priority Expected Expires Ordered    XR HIP RT G/E 2 VW (Clinic Performed) Routine 10/18/2018 10/18/2019 10/18/2018    XR HIP LT G/E 2 VW (Clinic Performed) Routine 10/18/2018 10/18/2019 10/18/2018    XR PELVIS 1/2 VW (Clinic Performed) Routine 10/18/2018 10/18/2019 10/18/2018            Who to contact     If you have questions or need follow up information about today's clinic visit or your schedule please contact [facility] directly at [phone].  Normal or non-critical lab and imaging results will be communicated to you by MyChart, letter or phone within 4 business days after the clinic has received the results. If you do not hear from us within 7 days, please contact the clinic through MyChart or phone. If you have a critical or abnormal lab result, we will notify you by phone as soon as " "possible.  Submit refill requests through [employer] or call your pharmacy and they will forward the refill request to us. Please allow 3 business days for your refill to be completed.          Additional Information About Your Visit        [employer] Information     [employer] gives you secure access to your electronic health record. If you see a primary care provider, you can also send messages to your care team and make appointments. If you have questions, please call your primary care clinic.  If you do not have a primary care provider, please call [phone] and they will assist you.        Care EveryWhere ID     This is your Care EveryWhere ID. This could be used by other organizations to access your [facility] medical records  [id number]        Your Vitals Were     Pulse Temperature Respirations Height Pulse Oximetry BMI (Body Mass Index)    77 98.2  F (36.8  C) (Tympanic) 16 5' 5\" (1.651 m) 98% 27.62 kg/m2       Blood Pressure from Last 3 Encounters:   10/18/18 118/62   06/09/18 (!) 119/49   02/17/18 135/80    Weight from Last 3 Encounters:   10/18/18 166 lb (75.3 kg)   02/01/18 166 lb (75.3 kg)   11/13/17 158 lb (71.7 kg)              We Performed the Following     CBC with platelets and differential     CRP, inflammation     ESR: Erythrocyte sedimentation rate     UA reflex to Microscopic and Culture - [place]     Vitamin D Deficiency          Today's Medication Changes          These changes are accurate as of 10/18/18 11:38 AM.  If you have any questions, ask your nurse or doctor.               Start taking these medicines.        Dose/Directions    cyclobenzaprine 10 MG tablet   Commonly known as:  FLEXERIL   Used for:  Spasm of muscle of lower back   Started by:  Toyin Puri PA        Dose:  5-10 mg   Take 0.5-1 tablets (5-10 mg) by mouth 3 times daily as needed for muscle spasms   Quantity:  30 tablet   Refills:  1       fluconazole 150 MG tablet   Commonly known as:  DIFLUCAN   Used for:  Dysuria "   Started by:  Toyin Puri PA        Dose:  150 mg   Take 1 tablet (150 mg) by mouth every 3 days   Quantity:  2 tablet   Refills:  0         Stop taking these medicines if you haven't already. Please contact your care team if you have questions.     carisoprodol 350 MG tablet   Commonly known as:  SOMA   Stopped by:  Toyin Puri PA                Where to get your medicines      These medications were sent to [facility] - [address] [facility] [address]     Phone:  [phone]     cyclobenzaprine 10 MG tablet    fluconazole 150 MG tablet                Primary Care Provider Office Phone # Fax #    MYRA Richardson [phone]       [address] [address]        Equal Access to Services     [facility]: Hadii ishmael murillo hadasho Soomaali, waaxda luqadaha, qaybta kaalmada adeegyada, waxcatalino hinkle . So [facility] [phone].    ATENCIÓN: Si habla español, tiene a talbert disposición servicios gratuitos de asistencia lingüística. [facility] [phone].    We comply with applicable federal civil rights laws and [place] laws. We do not discriminate on the basis of race, color, national origin, age, disability, sex, sexual orientation, or gender identity.            Thank you!     Thank you for choosing [facility]  for your care. Our goal is always to provide you with excellent care. Hearing back from our patients is one way we can continue to improve our services. Please take a few minutes to complete the written survey that you may receive in the mail after your visit with us. Thank you!             Your Updated Medication List - Protect others around you: Learn how to safely use, store and throw away your medicines at www.disposemymeds.org.          This list is accurate as of 10/18/18 11:38 AM.  Always use your most recent med list.                   Brand Name Dispense  Instructions for use Diagnosis    conjugated estrogens cream    PREMARIN    30 g    Insert/apply Finger tip full nighlty for 1 week then once a week as needed        cyclobenzaprine 10 MG tablet    FLEXERIL    30 tablet    Take 0.5-1 tablets (5-10 mg) by mouth 3 times daily as needed for muscle spasms    Spasm of muscle of lower back       D3-50 33951 units capsule   Generic drug:  cholecalciferol     12 capsule    TAKE 1 CAPSULE BY MOUTH ONCE WEEKLY    Vitamin D deficiency disease       fluconazole 150 MG tablet    DIFLUCAN    2 tablet    Take 1 tablet (150 mg) by mouth every 3 days    Dysuria       triamcinolone 0.1 % cream    KENALOG    80 g    Apply sparingly to affected area three times daily as needed    Nummular eczema       valACYclovir 1000 mg tablet    VALTREX    21 tablet    TAKE 1 TABLET BY MOUTH 3 TIMES DAILY    Disseminated herpes zoster       ZYRTEC ALLERGY 10 MG tablet   Generic drug:  cetirizine      Take 10 mg by mouth daily as needed.

## 2018-10-18 NOTE — NURSING NOTE
"Chief Complaint   Patient presents with     Medication Problem       Initial /62 (BP Location: Left arm, Patient Position: Sitting, Cuff Size: Adult Large)  Pulse 77  Temp 98.2  F (36.8  C) (Tympanic)  Resp 16  Ht 5' 5\" (1.651 m)  Wt 166 lb (75.3 kg)  SpO2 98%  BMI 27.62 kg/m2 Estimated body mass index is 27.62 kg/(m^2) as calculated from the following:    Height as of this encounter: 5' 5\" (1.651 m).    Weight as of this encounter: 166 lb (75.3 kg).  Medication Reconciliation: complete    Ginger Hampton LPN    "

## 2018-10-18 NOTE — PATIENT INSTRUCTIONS
Thank you for choosing [facility].   I have office hours 8:00 am to 4:30 pm on Monday's, Wednesday's, Thursday's and Friday's. My nurse and I are out of the office every Tuesday.    Following your visit, when your labs and diagnostic testing have returned, I will review then and you will be contacted by my nurse.  If you are on My Chart, you can also view results there.    For refills, notify your pharmacy regarding what you need and the pharmacy will generate a refill request. Do not call my nurse as she is unable to process refill request. Please plan ahead and allow 3-5 days for refill requests.    You will generally receive a reminder call the day prior to your appointment.  If you cannot attend your appointment, please cancel your appointment with as much notice as possible.  If there is a pattern of failure to present for your appointments, I cannot provide consistent, meaningful, ongoing care for you. It is very important to me that you come in for your care, so we can best assist you with your health care needs.    IMPORTANT:  Please note that it is my standard of practice to NOT participate in prescribing ongoing requested Narcotic Analgesic therapy, and/or participate in the prescribing of other controlled substances.  My nurse and I am happy to assist you with the process of referral for alternative pain management as needed, and other treatment modalities including but not limited to:  Physical Therapy, Physical Medicine and Rehab, Counseling, Chiropractic Care, Orthopedic Care, and non-narcotic medication management.     In the event that you need to be seen for emergent concerns and I am out of office,  please see one of my colleagues for acute concerns.  You may also present to  or ER.  I appreciate the opportunity to serve you and look forward to supporting your healthcare needs in the future. Please contact me with any questions or concerns that you may  have.    Sincerely,      Toyin Puri RN, PA-C

## 2018-10-19 LAB — DEPRECATED CALCIDIOL+CALCIFEROL SERPL-MC: 78 UG/L (ref 20–75)

## 2018-10-19 ASSESSMENT — PATIENT HEALTH QUESTIONNAIRE - PHQ9: SUM OF ALL RESPONSES TO PHQ QUESTIONS 1-9: 0

## 2018-10-19 ASSESSMENT — ANXIETY QUESTIONNAIRES: GAD7 TOTAL SCORE: 0

## 2018-10-23 ENCOUNTER — RADIANT APPOINTMENT (OUTPATIENT)
Dept: MAMMOGRAPHY | Facility: OTHER | Age: 58
End: 2018-10-23
Attending: PHYSICIAN ASSISTANT
Payer: COMMERCIAL

## 2018-10-23 DIAGNOSIS — Z12.31 VISIT FOR SCREENING MAMMOGRAM: ICD-10-CM

## 2018-10-23 PROCEDURE — 77063 BREAST TOMOSYNTHESIS BI: CPT | Mod: TC

## 2018-10-23 PROCEDURE — 77067 SCR MAMMO BI INCL CAD: CPT | Mod: TC

## 2018-11-01 ENCOUNTER — MYC MEDICAL ADVICE (OUTPATIENT)
Dept: FAMILY MEDICINE | Facility: OTHER | Age: 58
End: 2018-11-01

## 2019-01-02 NOTE — PROGRESS NOTES
darrellnl  SUBJECTIVE:   Patrizia Delcid is a 58 year old female who presents to clinic today for the following health issues:        Abdominal Pain      Duration: 2 months    Description (location/character/radiation): cramping and intermittent sharp pains in lower abdominal/pelvic area       Associated flank pain: None    Intensity:  3/10 today, at its worst 8/10    Accompanying signs and symptoms:        Fever/Chills: no        Gas/Bloating: YES       Nausea/vomitting: no        Diarrhea: no        Dysuria or Hematuria: YES- at times    History (previous similar pain/trauma/previous testing): no    Precipitating or alleviating factors:       Pain worse with eating/BM/urination: no       Pain relieved by BM: no     Therapies tried and outcome: tylenol and heating pad    LMP:  not applicable          Problem list and histories reviewed & adjusted, as indicated.  Additional history: as documented    Patient Active Problem List   Diagnosis     ACP (advance care planning)     Primary osteoarthritis of right shoulder     Elevated rheumatoid factor     Vitamin D deficiency disease     Past Surgical History:   Procedure Laterality Date     BIOPSY      breast biopsy     CHOLECYSTECTOMY       COLONOSCOPY  02/17/2011     HYSTERECTOMY TOTAL ABDOMINAL, BILATERAL SALPINGO-OOPHORECTOMY, COMBINED N/A     vaginal vs abdominal hyst?     LEFT LUMPECTOMY      Benign       Social History     Tobacco Use     Smoking status: Former Smoker     Types: Cigarettes     Smokeless tobacco: Never Used     Tobacco comment: Tried to Quit (YES); YR QUIT (1980); Passive Exposure (NO)   Substance Use Topics     Alcohol use: Yes     Alcohol/week: 0.0 oz     Comment: RARELY     Family History   Problem Relation Age of Onset     Myocardial Infarction Mother      C.A.D. Mother      Heart Disease Father      Cancer Father         LUNG     C.A.D. Father      Cancer Brother         LUNG     Myocardial Infarction Other         MYOCARDIAL INFARCTION      Cancer Brother         TESTICULAR     Breast Cancer Other      Myocardial Infarction Other         MYOCARDIAL INFARCTION         Current Outpatient Medications   Medication Sig Dispense Refill     cetirizine (ZYRTEC ALLERGY) 10 MG tablet Take 10 mg by mouth daily as needed.       conjugated estrogens (PREMARIN) cream Insert/apply Finger tip full nighlty for 1 week then once a week as needed 30 g 1     cyclobenzaprine (FLEXERIL) 10 MG tablet Take 0.5-1 tablets (5-10 mg) by mouth 3 times daily as needed for muscle spasms 30 tablet 1     Multiple Vitamins-Minerals (ONE-A-DAY WOMENS 50 PLUS PO) Take 1 tablet by mouth daily       sennosides (SENOKOT) 8.6 MG tablet Take 1 tablet by mouth daily       valACYclovir (VALTREX) 1000 mg tablet TAKE 1 TABLET BY MOUTH 3 TIMES DAILY (Patient taking differently: TAKE 1 TABLET BY MOUTH 3 TIMES DAILY PRN) 21 tablet 1     Allergies   Allergen Reactions     Codeine Nausea     Recent Labs   Lab Test 02/17/18  1158 02/01/18  1421 10/27/16  1608 12/28/13  1939 11/04/13  0822   LDL  --   --   --   --  150*   HDL  --   --   --   --  43   TRIG  --   --   --   --  154*   ALT 36  --  36 31 36   CR 0.77 0.77 0.73 0.94 0.94   GFRESTIMATED 77 77 82 62 62   GFRESTBLACK >90 >90 >90   GFR Calc   75 75   POTASSIUM 3.6 3.8 4.0 3.6 4.7   TSH  --   --  1.37  --  2.21      BP Readings from Last 3 Encounters:   01/03/19 110/70   10/18/18 118/62   06/09/18 (!) 119/49    Wt Readings from Last 3 Encounters:   01/03/19 67.1 kg (148 lb)   10/18/18 75.3 kg (166 lb)   02/01/18 75.3 kg (166 lb)                    Reviewed and updated as needed this visit by clinical staff       Reviewed and updated as needed this visit by Provider         ROS:  Constitutional, neuro, ENT, endocrine, pulmonary, cardiac, gastrointestinal, genitourinary, musculoskeletal, integument and psychiatric systems are negative, except as otherwise noted.    OBJECTIVE:                                                    BP  "110/70 (BP Location: Left arm, Patient Position: Sitting, Cuff Size: Adult Regular)   Pulse 80   Resp 16   Ht 1.651 m (5' 5\")   Wt 67.1 kg (148 lb)   SpO2 97%   BMI 24.63 kg/m    Body mass index is 24.63 kg/m .  GENERAL APPEARANCE: healthy, alert and no distress  EYES: Eyes grossly normal to inspection, PERRL and conjunctivae and sclerae normal  HENT: ear canals and TM's normal and nose and mouth without ulcers or lesions  NECK: no adenopathy, no asymmetry, masses, or scars and thyroid normal to palpation  RESP: lungs clear to auscultation - no rales, rhonchi or wheezes  CV: regular rates and rhythm, normal S1 S2, no S3 or S4 and no murmur, click or rub  LYMPHATICS: no cervical adenopathy  MS: extremities normal- no gross deformities noted  SKIN: no suspicious lesions or rashes  NEURO: Normal strength and tone, mentation intact and speech normal  PSYCH: mentation appears normal and affect normal/bright    Diagnostic test results:  Diagnostic Test Results:  No results found for this or any previous visit (from the past 24 hour(s)).     Results for orders placed or performed in visit on 01/03/19   CBC with platelets and differential   Result Value Ref Range    WBC 8.3 4.0 - 11.0 10e9/L    RBC Count 4.57 3.8 - 5.2 10e12/L    Hemoglobin 14.4 11.7 - 15.7 g/dL    Hematocrit 43.0 35.0 - 47.0 %    MCV 94 78 - 100 fl    MCH 31.5 26.5 - 33.0 pg    MCHC 33.5 31.5 - 36.5 g/dL    RDW 13.1 10.0 - 15.0 %    Platelet Count 214 150 - 450 10e9/L    Diff Method Automated Method     % Neutrophils 72.4 %    % Lymphocytes 18.8 %    % Monocytes 5.8 %    % Eosinophils 2.3 %    % Basophils 0.5 %    % Immature Granulocytes 0.2 %    Nucleated RBCs 0 0 /100    Absolute Neutrophil 6.0 1.6 - 8.3 10e9/L    Absolute Lymphocytes 1.6 0.8 - 5.3 10e9/L    Absolute Monocytes 0.5 0.0 - 1.3 10e9/L    Absolute Eosinophils 0.2 0.0 - 0.7 10e9/L    Absolute Basophils 0.0 0.0 - 0.2 10e9/L    Abs Immature Granulocytes 0.0 0 - 0.4 10e9/L    Absolute " Nucleated RBC 0.0    CRP inflammation   Result Value Ref Range    CRP Inflammation 73.1 (H) 0.0 - 8.0 mg/L   ESR: Erythrocyte sedimentation rate   Result Value Ref Range    Sed Rate 19 0 - 30 mm/h   UA reflex to Microscopic and Culture - [place]   Result Value Ref Range    Color Urine Light Yellow     Appearance Urine Clear     Glucose Urine Negative NEG^Negative mg/dL    Bilirubin Urine Negative NEG^Negative    Ketones Urine 5 (A) NEG^Negative mg/dL    Specific Gravity Urine 1.003 1.003 - 1.035    Blood Urine Negative NEG^Negative    pH Urine 6.5 4.7 - 8.0 pH    Protein Albumin Urine Negative NEG^Negative mg/dL    Urobilinogen mg/dL Normal 0.0 - 2.0 mg/dL    Nitrite Urine Negative NEG^Negative    Leukocyte Esterase Urine Negative NEG^Negative    Source Midstream Urine    Basic metabolic panel   Result Value Ref Range    Sodium 137 133 - 144 mmol/L    Potassium 4.2 3.4 - 5.3 mmol/L    Chloride 103 94 - 109 mmol/L    Carbon Dioxide 29 20 - 32 mmol/L    Anion Gap 5 3 - 14 mmol/L    Glucose 96 70 - 99 mg/dL    Urea Nitrogen 6 (L) 7 - 30 mg/dL    Creatinine 0.72 0.52 - 1.04 mg/dL    GFR Estimate >90 >60 mL/min/[1.73_m2]    GFR Estimate If Black >90 >60 mL/min/[1.73_m2]    Calcium 9.6 8.5 - 10.1 mg/dL       ASSESSMENT/PLAN:                                                    1. Abdominal pain, left lower quadrant  Ct abdomen and pelvis soon. Start ABX and go from there. Her WBC is not elevated and her temp is ok. Eating ok. No vomiting. If worsening we will have her see general surgery.   - CBC with platelets and differential  - CRP inflammation  - ESR: Erythrocyte sedimentation rate  - UA reflex to Microscopic and Culture - [place]  - Basic metabolic panel      See Patient Instructions    MYRA Tsang  [facility] [place]

## 2019-01-03 ENCOUNTER — OFFICE VISIT (OUTPATIENT)
Dept: FAMILY MEDICINE | Facility: OTHER | Age: 59
End: 2019-01-03
Attending: PHYSICIAN ASSISTANT
Payer: COMMERCIAL

## 2019-01-03 VITALS
HEART RATE: 80 BPM | RESPIRATION RATE: 16 BRPM | DIASTOLIC BLOOD PRESSURE: 70 MMHG | OXYGEN SATURATION: 97 % | BODY MASS INDEX: 24.66 KG/M2 | WEIGHT: 148 LBS | SYSTOLIC BLOOD PRESSURE: 110 MMHG | HEIGHT: 65 IN

## 2019-01-03 DIAGNOSIS — R10.32 ABDOMINAL PAIN, LEFT LOWER QUADRANT: ICD-10-CM

## 2019-01-03 PROBLEM — M06.9 RHEUMATOID ARTHRITIS (H): Status: ACTIVE | Noted: 2019-01-03

## 2019-01-03 LAB
ALBUMIN UR-MCNC: NEGATIVE MG/DL
ANION GAP SERPL CALCULATED.3IONS-SCNC: 5 MMOL/L (ref 3–14)
APPEARANCE UR: CLEAR
BASOPHILS # BLD AUTO: 0 10E9/L (ref 0–0.2)
BASOPHILS NFR BLD AUTO: 0.5 %
BILIRUB UR QL STRIP: NEGATIVE
BUN SERPL-MCNC: 6 MG/DL (ref 7–30)
CALCIUM SERPL-MCNC: 9.6 MG/DL (ref 8.5–10.1)
CHLORIDE SERPL-SCNC: 103 MMOL/L (ref 94–109)
CO2 SERPL-SCNC: 29 MMOL/L (ref 20–32)
COLOR UR AUTO: ABNORMAL
CREAT SERPL-MCNC: 0.72 MG/DL (ref 0.52–1.04)
CRP SERPL-MCNC: 73.1 MG/L (ref 0–8)
DIFFERENTIAL METHOD BLD: NORMAL
EOSINOPHIL # BLD AUTO: 0.2 10E9/L (ref 0–0.7)
EOSINOPHIL NFR BLD AUTO: 2.3 %
ERYTHROCYTE [DISTWIDTH] IN BLOOD BY AUTOMATED COUNT: 13.1 % (ref 10–15)
ERYTHROCYTE [SEDIMENTATION RATE] IN BLOOD BY WESTERGREN METHOD: 19 MM/H (ref 0–30)
GFR SERPL CREATININE-BSD FRML MDRD: >90 ML/MIN/{1.73_M2}
GLUCOSE SERPL-MCNC: 96 MG/DL (ref 70–99)
GLUCOSE UR STRIP-MCNC: NEGATIVE MG/DL
HCT VFR BLD AUTO: 43 % (ref 35–47)
HGB BLD-MCNC: 14.4 G/DL (ref 11.7–15.7)
HGB UR QL STRIP: NEGATIVE
IMM GRANULOCYTES # BLD: 0 10E9/L (ref 0–0.4)
IMM GRANULOCYTES NFR BLD: 0.2 %
KETONES UR STRIP-MCNC: 5 MG/DL
LEUKOCYTE ESTERASE UR QL STRIP: NEGATIVE
LYMPHOCYTES # BLD AUTO: 1.6 10E9/L (ref 0.8–5.3)
LYMPHOCYTES NFR BLD AUTO: 18.8 %
MCH RBC QN AUTO: 31.5 PG (ref 26.5–33)
MCHC RBC AUTO-ENTMCNC: 33.5 G/DL (ref 31.5–36.5)
MCV RBC AUTO: 94 FL (ref 78–100)
MONOCYTES # BLD AUTO: 0.5 10E9/L (ref 0–1.3)
MONOCYTES NFR BLD AUTO: 5.8 %
NEUTROPHILS # BLD AUTO: 6 10E9/L (ref 1.6–8.3)
NEUTROPHILS NFR BLD AUTO: 72.4 %
NITRATE UR QL: NEGATIVE
NRBC # BLD AUTO: 0 10*3/UL
NRBC BLD AUTO-RTO: 0 /100
PH UR STRIP: 6.5 PH (ref 4.7–8)
PLATELET # BLD AUTO: 214 10E9/L (ref 150–450)
POTASSIUM SERPL-SCNC: 4.2 MMOL/L (ref 3.4–5.3)
RBC # BLD AUTO: 4.57 10E12/L (ref 3.8–5.2)
SODIUM SERPL-SCNC: 137 MMOL/L (ref 133–144)
SOURCE: ABNORMAL
SP GR UR STRIP: 1 (ref 1–1.03)
UROBILINOGEN UR STRIP-MCNC: NORMAL MG/DL (ref 0–2)
WBC # BLD AUTO: 8.3 10E9/L (ref 4–11)

## 2019-01-03 PROCEDURE — 99214 OFFICE O/P EST MOD 30 MIN: CPT | Performed by: PHYSICIAN ASSISTANT

## 2019-01-03 PROCEDURE — 36415 COLL VENOUS BLD VENIPUNCTURE: CPT | Performed by: PHYSICIAN ASSISTANT

## 2019-01-03 PROCEDURE — 85025 COMPLETE CBC W/AUTO DIFF WBC: CPT | Performed by: PHYSICIAN ASSISTANT

## 2019-01-03 PROCEDURE — 85652 RBC SED RATE AUTOMATED: CPT | Performed by: PHYSICIAN ASSISTANT

## 2019-01-03 PROCEDURE — 86140 C-REACTIVE PROTEIN: CPT | Performed by: PHYSICIAN ASSISTANT

## 2019-01-03 PROCEDURE — 80048 BASIC METABOLIC PNL TOTAL CA: CPT | Performed by: PHYSICIAN ASSISTANT

## 2019-01-03 PROCEDURE — 81003 URINALYSIS AUTO W/O SCOPE: CPT | Performed by: PHYSICIAN ASSISTANT

## 2019-01-03 RX ORDER — SENNOSIDES 8.6 MG
1 TABLET ORAL DAILY
COMMUNITY

## 2019-01-03 RX ORDER — METRONIDAZOLE 500 MG/1
500 TABLET ORAL 3 TIMES DAILY
Qty: 30 TABLET | Refills: 0 | Status: SHIPPED | OUTPATIENT
Start: 2019-01-03 | End: 2019-04-04

## 2019-01-03 RX ORDER — LEVOFLOXACIN 750 MG/1
750 TABLET, FILM COATED ORAL DAILY
Qty: 10 TABLET | Refills: 0 | Status: SHIPPED | OUTPATIENT
Start: 2019-01-03 | End: 2019-04-04

## 2019-01-03 ASSESSMENT — ANXIETY QUESTIONNAIRES
2. NOT BEING ABLE TO STOP OR CONTROL WORRYING: NOT AT ALL
1. FEELING NERVOUS, ANXIOUS, OR ON EDGE: NOT AT ALL
7. FEELING AFRAID AS IF SOMETHING AWFUL MIGHT HAPPEN: NOT AT ALL
4. TROUBLE RELAXING: NOT AT ALL
3. WORRYING TOO MUCH ABOUT DIFFERENT THINGS: NOT AT ALL
GAD7 TOTAL SCORE: 0
6. BECOMING EASILY ANNOYED OR IRRITABLE: NOT AT ALL
5. BEING SO RESTLESS THAT IT IS HARD TO SIT STILL: NOT AT ALL

## 2019-01-03 ASSESSMENT — MIFFLIN-ST. JEOR: SCORE: 1247.2

## 2019-01-03 ASSESSMENT — PAIN SCALES - GENERAL: PAINLEVEL: MILD PAIN (3)

## 2019-01-03 ASSESSMENT — PATIENT HEALTH QUESTIONNAIRE - PHQ9: SUM OF ALL RESPONSES TO PHQ QUESTIONS 1-9: 0

## 2019-01-03 NOTE — NURSING NOTE
"Chief Complaint   Patient presents with     Abdominal Pain       Initial /70 (BP Location: Left arm, Patient Position: Sitting, Cuff Size: Adult Regular)   Pulse 80   Resp 16   Ht 1.651 m (5' 5\")   Wt 67.1 kg (148 lb)   SpO2 97%   BMI 24.63 kg/m   Estimated body mass index is 24.63 kg/m  as calculated from the following:    Height as of this encounter: 1.651 m (5' 5\").    Weight as of this encounter: 67.1 kg (148 lb).  Medication Reconciliation: complete    Rolanda Patel LPN  "

## 2019-01-03 NOTE — PATIENT INSTRUCTIONS
Thank you for choosing [facility].   I have office hours 8:00 am to 4:30 pm on Monday's, Wednesday's, Thursday's and Friday's. My nurse and I are out of the office every Tuesday.    Following your visit, when your labs and diagnostic testing have returned, I will review then and you will be contacted by my nurse.  If you are on My Chart, you can also view results there.    For refills, notify your pharmacy regarding what you need and the pharmacy will generate a refill request. Do not call my nurse as she is unable to process refill request. Please plan ahead and allow 3-5 days for refill requests.    You will generally receive a reminder call the day prior to your appointment.  If you cannot attend your appointment, please cancel your appointment with as much notice as possible.  If there is a pattern of failure to present for your appointments, I cannot provide consistent, meaningful, ongoing care for you. It is very important to me that you come in for your care, so we can best assist you with your health care needs.    IMPORTANT:  Please note that it is my standard of practice to NOT participate in prescribing ongoing requested Narcotic Analgesic therapy, and/or participate in the prescribing of other controlled substances.  My nurse and I am happy to assist you with the process of referral for alternative pain management as needed, and other treatment modalities including but not limited to:  Physical Therapy, Physical Medicine and Rehab, Counseling, Chiropractic Care, Orthopedic Care, and non-narcotic medication management.     In the event that you need to be seen for emergent concerns and I am out of office,  please see one of my colleagues for acute concerns.  You may also present to  or ER.  I appreciate the opportunity to serve you and look forward to supporting your healthcare needs in the future. Please contact me with any questions or concerns that you may  have.    Sincerely,      Toyin Puri RN, PA-C

## 2019-01-04 ENCOUNTER — HOSPITAL ENCOUNTER (OUTPATIENT)
Dept: CT IMAGING | Facility: HOSPITAL | Age: 59
Discharge: HOME | End: 2019-01-04
Attending: PHYSICIAN ASSISTANT | Admitting: PHYSICIAN ASSISTANT
Payer: COMMERCIAL

## 2019-01-04 DIAGNOSIS — R10.32 ABDOMINAL PAIN, LEFT LOWER QUADRANT: ICD-10-CM

## 2019-01-04 PROCEDURE — 74177 CT ABD & PELVIS W/CONTRAST: CPT | Mod: TC

## 2019-01-04 PROCEDURE — 25500064 ZZH RX 255 OP 636: Performed by: RADIOLOGY

## 2019-01-04 RX ORDER — IOPAMIDOL 612 MG/ML
100 INJECTION, SOLUTION INTRAVASCULAR ONCE
Status: COMPLETED | OUTPATIENT
Start: 2019-01-04 | End: 2019-01-04

## 2019-01-04 RX ADMIN — IOPAMIDOL 100 ML: 612 INJECTION, SOLUTION INTRAVENOUS at 12:16

## 2019-01-04 RX ADMIN — DIATRIZOATE MEGLUMINE AND DIATRIZOATE SODIUM 30 ML: 660; 100 SOLUTION ORAL; RECTAL at 12:16

## 2019-01-04 ASSESSMENT — ANXIETY QUESTIONNAIRES: GAD7 TOTAL SCORE: 0

## 2019-01-04 NOTE — PROGRESS NOTES
SUBJECTIVE:   Patrizia Delcid is a 58 year old female who presents to clinic today for the following health issues:      Abdominal Pain f/u       Duration: f/u     Description (location/character/radiation): cramping and intermittent sharp pains in lower abdominal/ pelvic area       Associated flank pain: None    Intensity:  2/10 today 8/10 at worst    Accompanying signs and symptoms:        Fever/Chills: no        Gas/Bloating: YES       Nausea/vomitting: no        Diarrhea: no        Dysuria or Hematuria: YES- at times    History (previous similar pain/trauma/previous testing): none    Precipitating or alleviating factors:       Pain worse with eating/BM/urination: no       Pain relieved by BM: no     Therapies tried and outcome: levaquin and flagy helping     LMP:  not applicable          Problem list and histories reviewed & adjusted, as indicated.  Additional history: as documented    Patient Active Problem List   Diagnosis     ACP (advance care planning)     Primary osteoarthritis of right shoulder     Elevated rheumatoid factor     Vitamin D deficiency disease     Rheumatoid arthritis (H)     Past Surgical History:   Procedure Laterality Date     BIOPSY      breast biopsy     CHOLECYSTECTOMY       COLONOSCOPY  02/17/2011     HYSTERECTOMY TOTAL ABDOMINAL, BILATERAL SALPINGO-OOPHORECTOMY, COMBINED N/A     vaginal vs abdominal hyst?     LEFT LUMPECTOMY      Benign       Social History     Tobacco Use     Smoking status: Former Smoker     Types: Cigarettes     Smokeless tobacco: Never Used     Tobacco comment: Tried to Quit (YES); YR QUIT (1980); Passive Exposure (NO)   Substance Use Topics     Alcohol use: Yes     Alcohol/week: 0.0 oz     Comment: RARELY     Family History   Problem Relation Age of Onset     Myocardial Infarction Mother      C.A.D. Mother      Heart Disease Father      Cancer Father         LUNG     C.A.D. Father      Cancer Brother         LUNG     Myocardial Infarction Other          MYOCARDIAL INFARCTION     Cancer Brother         TESTICULAR     Breast Cancer Other      Myocardial Infarction Other         MYOCARDIAL INFARCTION         Current Outpatient Medications   Medication Sig Dispense Refill     cetirizine (ZYRTEC ALLERGY) 10 MG tablet Take 10 mg by mouth daily as needed.       conjugated estrogens (PREMARIN) cream Insert/apply Finger tip full nighlty for 1 week then once a week as needed 30 g 1     cyclobenzaprine (FLEXERIL) 10 MG tablet Take 0.5-1 tablets (5-10 mg) by mouth 3 times daily as needed for muscle spasms 30 tablet 1     levofloxacin (LEVAQUIN) 750 MG tablet Take 1 tablet (750 mg) by mouth daily for 10 days 10 tablet 0     metroNIDAZOLE (FLAGYL) 500 MG tablet Take 1 tablet (500 mg) by mouth 3 times daily for 10 days 30 tablet 0     Multiple Vitamins-Minerals (ONE-A-DAY WOMENS 50 PLUS PO) Take 1 tablet by mouth daily       sennosides (SENOKOT) 8.6 MG tablet Take 1 tablet by mouth daily       valACYclovir (VALTREX) 1000 mg tablet TAKE 1 TABLET BY MOUTH 3 TIMES DAILY (Patient taking differently: TAKE 1 TABLET BY MOUTH 3 TIMES DAILY PRN) 21 tablet 1     Allergies   Allergen Reactions     Codeine Nausea     Recent Labs   Lab Test 01/03/19  1452 02/17/18  1158  10/27/16  1608 12/28/13  1939 11/04/13  0822   LDL  --   --   --   --   --  150*   HDL  --   --   --   --   --  43   TRIG  --   --   --   --   --  154*   ALT  --  36  --  36 31 36   CR 0.72 0.77   < > 0.73 0.94 0.94   GFRESTIMATED >90 77   < > 82 62 62   GFRESTBLACK >90 >90   < > >90   GFR Calc   75 75   POTASSIUM 4.2 3.6   < > 4.0 3.6 4.7   TSH  --   --   --  1.37  --  2.21    < > = values in this interval not displayed.      BP Readings from Last 3 Encounters:   01/07/19 128/82   01/03/19 110/70   10/18/18 118/62    Wt Readings from Last 3 Encounters:   01/07/19 66.2 kg (146 lb)   01/03/19 67.1 kg (148 lb)   10/18/18 75.3 kg (166 lb)                    Reviewed and updated as needed this visit by clinical  staff  Tobacco  Allergies  Meds  Med Hx  Surg Hx  Fam Hx  Soc Hx      Reviewed and updated as needed this visit by Provider         ROS:  Constitutional, HEENT, cardiovascular, pulmonary, gi and gu systems are negative, except as otherwise noted.    OBJECTIVE:                                                    /82   Pulse 77   Temp 97.6  F (36.4  C) (Tympanic)   Wt 66.2 kg (146 lb)   SpO2 98%   BMI 24.30 kg/m    Body mass index is 24.3 kg/m .  GENERAL APPEARANCE: healthy, alert, no distress and fatigued  ABDOMEN: soft, nontender, without hepatosplenomegaly or masses, bowel sounds normal and tenderness is better.   MS: extremities normal- no gross deformities noted  SKIN: no suspicious lesions or rashes  NEURO: Normal strength and tone, mentation intact and speech normal  PSYCH: mentation appears normal and affect normal/bright    Diagnostic test results:  Diagnostic Test Results:  Results for orders placed or performed during the hospital encounter of 01/04/19   CT Abdomen Pelvis w Contrast    Narrative    EXAMINATION: CT ABDOMEN PELVIS W CONTRAST, 1/4/2019 12:23 PM    TECHNIQUE:  Helical CT images from the lung bases through the  symphysis pubis were obtained  with IV contrast. Contrast dose: ISOVUE  300  100ML    COMPARISON: none    HISTORY: Abd pain, diverticulitis suspected; Abdominal pain, left  lower quadrant    FINDINGS:    There is dependent atelectasis at the lung bases.    The liver is free of masses or biliary ductal enlargement. The  gallbladder is been removed    The the spleen and pancreas appear normal.    The adrenal glands are normal.    The right and left kidneys are free of masses or hydronephrosis.    The periaortic lymph nodes are normal in caliber.    Multiple sigmoid diverticula are noted with some pericolonic  inflammation seen in this would be consistent with mild  diverticulitis.    In the pelvis the bladder and rectum appear normal.    The regional skeleton is intact       Impression    IMPRESSION: Mild diverticulitis     YA MICHEL MD        ASSESSMENT/PLAN:                                                    1. Diverticulitis of colon  Feeling a little bit better. Finally today feels less pain. No fevers and no diarrhea. probiotics are taken and helpful. Stools are soft.   - CRP inflammation      See Patient Instructions    MYRA Tsang  [facility] - [facility]

## 2019-01-07 ENCOUNTER — OFFICE VISIT (OUTPATIENT)
Dept: FAMILY MEDICINE | Facility: OTHER | Age: 59
End: 2019-01-07
Attending: PHYSICIAN ASSISTANT
Payer: COMMERCIAL

## 2019-01-07 VITALS
OXYGEN SATURATION: 98 % | SYSTOLIC BLOOD PRESSURE: 128 MMHG | HEART RATE: 77 BPM | BODY MASS INDEX: 24.3 KG/M2 | WEIGHT: 146 LBS | TEMPERATURE: 97.6 F | DIASTOLIC BLOOD PRESSURE: 82 MMHG

## 2019-01-07 DIAGNOSIS — K57.32 DIVERTICULITIS OF COLON: Primary | ICD-10-CM

## 2019-01-07 LAB — CRP SERPL-MCNC: 6.3 MG/L (ref 0–8)

## 2019-01-07 PROCEDURE — 86140 C-REACTIVE PROTEIN: CPT | Performed by: PHYSICIAN ASSISTANT

## 2019-01-07 PROCEDURE — 36415 COLL VENOUS BLD VENIPUNCTURE: CPT | Performed by: PHYSICIAN ASSISTANT

## 2019-01-07 PROCEDURE — 99213 OFFICE O/P EST LOW 20 MIN: CPT | Performed by: PHYSICIAN ASSISTANT

## 2019-01-07 ASSESSMENT — ANXIETY QUESTIONNAIRES
4. TROUBLE RELAXING: NOT AT ALL
7. FEELING AFRAID AS IF SOMETHING AWFUL MIGHT HAPPEN: NOT AT ALL
1. FEELING NERVOUS, ANXIOUS, OR ON EDGE: NOT AT ALL
3. WORRYING TOO MUCH ABOUT DIFFERENT THINGS: NOT AT ALL
2. NOT BEING ABLE TO STOP OR CONTROL WORRYING: NOT AT ALL
6. BECOMING EASILY ANNOYED OR IRRITABLE: NOT AT ALL
GAD7 TOTAL SCORE: 0
5. BEING SO RESTLESS THAT IT IS HARD TO SIT STILL: NOT AT ALL

## 2019-01-07 ASSESSMENT — PAIN SCALES - GENERAL: PAINLEVEL: MILD PAIN (2)

## 2019-01-07 ASSESSMENT — PATIENT HEALTH QUESTIONNAIRE - PHQ9: SUM OF ALL RESPONSES TO PHQ QUESTIONS 1-9: 0

## 2019-01-07 NOTE — PATIENT INSTRUCTIONS
Thank you for choosing [facility].   I have office hours 8:00 am to 4:30 pm on Monday's, Wednesday's, Thursday's and Friday's. My nurse and I are out of the office every Tuesday.    Following your visit, when your labs and diagnostic testing have returned, I will review then and you will be contacted by my nurse.  If you are on My Chart, you can also view results there.    For refills, notify your pharmacy regarding what you need and the pharmacy will generate a refill request. Do not call my nurse as she is unable to process refill request. Please plan ahead and allow 3-5 days for refill requests.    You will generally receive a reminder call the day prior to your appointment.  If you cannot attend your appointment, please cancel your appointment with as much notice as possible.  If there is a pattern of failure to present for your appointments, I cannot provide consistent, meaningful, ongoing care for you. It is very important to me that you come in for your care, so we can best assist you with your health care needs.    IMPORTANT:  Please note that it is my standard of practice to NOT participate in prescribing ongoing requested Narcotic Analgesic therapy, and/or participate in the prescribing of other controlled substances.  My nurse and I am happy to assist you with the process of referral for alternative pain management as needed, and other treatment modalities including but not limited to:  Physical Therapy, Physical Medicine and Rehab, Counseling, Chiropractic Care, Orthopedic Care, and non-narcotic medication management.     In the event that you need to be seen for emergent concerns and I am out of office,  please see one of my colleagues for acute concerns.  You may also present to  or ER.  I appreciate the opportunity to serve you and look forward to supporting your healthcare needs in the future. Please contact me with any questions or concerns that you may  have.    Sincerely,      Toyin Puri RN, PA-C

## 2019-01-07 NOTE — NURSING NOTE
"Chief Complaint   Patient presents with     Abdominal Pain       Initial /82   Pulse 77   Temp 97.6  F (36.4  C) (Tympanic)   Wt 66.2 kg (146 lb)   SpO2 98%   BMI 24.30 kg/m   Estimated body mass index is 24.3 kg/m  as calculated from the following:    Height as of 1/3/19: 1.651 m (5' 5\").    Weight as of this encounter: 66.2 kg (146 lb).  Medication Reconciliation: complete    Anny Allison LPN  "

## 2019-01-09 ASSESSMENT — ANXIETY QUESTIONNAIRES: GAD7 TOTAL SCORE: 0

## 2019-01-10 ENCOUNTER — TELEPHONE (OUTPATIENT)
Dept: FAMILY MEDICINE | Facility: OTHER | Age: 59
End: 2019-01-10

## 2019-01-10 NOTE — TELEPHONE ENCOUNTER
Patient called stating she saw Toyin Puri on 01/07/18. Patient was diagnosed with diverticulitis and was prescribed Levaquin. Patient states last night she noticed her arms and legs started feeling weak.  Patient stated feeling was worse when she work up this morning. Patient states feeling is bilateral.  Patient is able to ambulate.  Patient states she is not experiencing any dizziness, shortness or breath, or slurred speech. Patient states listed side effects is extremity weakness. Patient wondering if this is a medication side effect or if patient needs to be seen. Please advise.    Patient can be reached at the following number:  [phone]

## 2019-01-10 NOTE — TELEPHONE ENCOUNTER
Spoke with provider. Toyin states for patient to stop Levaquin but continue the Flagyl for Diverticulitis.  Provider states for patient to call back if symptoms continue and she may need to prescribe prednisone.  Called patient and informed of providers recommendations. Patient states that stomach pain has improved and understood to stop Levaquin and just continue with Flagyl. Patient also advised if she experience shortness of breath, dizziness, chest pain, or slurred speech patient needs to be seen in Emergency Department/ Urgent Care.

## 2019-01-15 NOTE — PROGRESS NOTES
SUBJECTIVE:   Patrizia Delcid is a 58 year old female who presents to clinic today for the following health issues:      Abdominal Pain      Duration: follow up    Description (location/character/radiation): cramping/intermittent sharp pains in lower abdomen, pelvic area       Associated flank pain: None    Intensity:  0/10    Accompanying signs and symptoms:        Fever/Chills: no        Gas/Bloating: no        Nausea/vomitting: no        Diarrhea: no        Dysuria or Hematuria: no     History (previous similar pain/trauma/previous testing): none     Precipitating or alleviating factors:       Pain worse with eating/BM/urination: no       Pain relieved by BM: no     Therapies tried and outcome: levaquin and flagyl- symptoms resolved     LMP:  not applicable          Problem list and histories reviewed & adjusted, as indicated.  Additional history: as documented    Patient Active Problem List   Diagnosis     ACP (advance care planning)     Primary osteoarthritis of right shoulder     Elevated rheumatoid factor     Vitamin D deficiency disease     Rheumatoid arthritis (H)     Past Surgical History:   Procedure Laterality Date     BIOPSY      breast biopsy     CHOLECYSTECTOMY       COLONOSCOPY  02/17/2011     HYSTERECTOMY TOTAL ABDOMINAL, BILATERAL SALPINGO-OOPHORECTOMY, COMBINED N/A     vaginal vs abdominal hyst?     LEFT LUMPECTOMY      Benign       Social History     Tobacco Use     Smoking status: Former Smoker     Types: Cigarettes     Smokeless tobacco: Never Used     Tobacco comment: Tried to Quit (YES); YR QUIT (1980); Passive Exposure (NO)   Substance Use Topics     Alcohol use: Yes     Alcohol/week: 0.0 oz     Comment: RARELY     Family History   Problem Relation Age of Onset     Myocardial Infarction Mother      C.A.D. Mother      Heart Disease Father      Cancer Father         LUNG     C.A.D. Father      Cancer Brother         LUNG     Myocardial Infarction Other         MYOCARDIAL INFARCTION      Cancer Brother         TESTICULAR     Breast Cancer Other      Myocardial Infarction Other         MYOCARDIAL INFARCTION         Current Outpatient Medications   Medication Sig Dispense Refill     cetirizine (ZYRTEC ALLERGY) 10 MG tablet Take 10 mg by mouth daily as needed.       conjugated estrogens (PREMARIN) cream Insert/apply Finger tip full nighlty for 1 week then once a week as needed 30 g 1     cyclobenzaprine (FLEXERIL) 10 MG tablet Take 0.5-1 tablets (5-10 mg) by mouth 3 times daily as needed for muscle spasms 30 tablet 1     Multiple Vitamins-Minerals (ONE-A-DAY WOMENS 50 PLUS PO) Take 1 tablet by mouth daily       Probiotic Product (PROBIOTIC DAILY PO)        sennosides (SENOKOT) 8.6 MG tablet Take 1 tablet by mouth daily       valACYclovir (VALTREX) 1000 mg tablet TAKE 1 TABLET BY MOUTH 3 TIMES DAILY (Patient taking differently: TAKE 1 TABLET BY MOUTH 3 TIMES DAILY PRN) 21 tablet 1     Allergies   Allergen Reactions     Codeine Nausea     Recent Labs   Lab Test 01/03/19  1452 02/17/18  1158  10/27/16  1608 12/28/13  1939 11/04/13  0822   LDL  --   --   --   --   --  150*   HDL  --   --   --   --   --  43   TRIG  --   --   --   --   --  154*   ALT  --  36  --  36 31 36   CR 0.72 0.77   < > 0.73 0.94 0.94   GFRESTIMATED >90 77   < > 82 62 62   GFRESTBLACK >90 >90   < > >90   GFR Calc   75 75   POTASSIUM 4.2 3.6   < > 4.0 3.6 4.7   TSH  --   --   --  1.37  --  2.21    < > = values in this interval not displayed.      BP Readings from Last 3 Encounters:   01/17/19 118/74   01/07/19 128/82   01/03/19 110/70    Wt Readings from Last 3 Encounters:   01/17/19 66.2 kg (146 lb)   01/07/19 66.2 kg (146 lb)   01/03/19 67.1 kg (148 lb)                    Reviewed and updated as needed this visit by clinical staff  Tobacco  Allergies  Meds  Med Hx  Surg Hx  Fam Hx  Soc Hx      Reviewed and updated as needed this visit by Provider         ROS:  Constitutional, HEENT, cardiovascular, pulmonary, gi  and gu systems are negative, except as otherwise noted.    OBJECTIVE:                                                    /74   Pulse 94   Wt 66.2 kg (146 lb)   SpO2 98%   BMI 24.30 kg/m    Body mass index is 24.3 kg/m .  GENERAL APPEARANCE: healthy, alert and no distress  RESP: lungs clear to auscultation - no rales, rhonchi or wheezes  ABDOMEN: soft, nontender, without hepatosplenomegaly or masses and bowel sounds normal  SKIN: no suspicious lesions or rashes  NEURO: Normal strength and tone, mentation intact and speech normal  PSYCH: mentation appears normal and affect normal/bright    Diagnostic test results:  Diagnostic Test Results:  Results for orders placed or performed in visit on 01/07/19   CRP inflammation   Result Value Ref Range    CRP Inflammation 6.3 0.0 - 8.0 mg/L        ASSESSMENT/PLAN:                                                    1. Colon cancer screening  She is much better and due for colon screening still having a bit of discomfort in the lower colon.     - GENERAL SURG ADULT REFERRAL    2. Diverticulitis of colon  Has been on abx and pain is limited.  Second bout in 2 years.       See Patient Instructions    Toyin Puri PA-C  [facility] - [facility]

## 2019-01-17 ENCOUNTER — OFFICE VISIT (OUTPATIENT)
Dept: FAMILY MEDICINE | Facility: OTHER | Age: 59
End: 2019-01-17
Attending: PHYSICIAN ASSISTANT
Payer: COMMERCIAL

## 2019-01-17 VITALS
SYSTOLIC BLOOD PRESSURE: 118 MMHG | HEART RATE: 94 BPM | OXYGEN SATURATION: 98 % | DIASTOLIC BLOOD PRESSURE: 74 MMHG | WEIGHT: 146 LBS | BODY MASS INDEX: 24.3 KG/M2

## 2019-01-17 DIAGNOSIS — K57.32 DIVERTICULITIS OF COLON: ICD-10-CM

## 2019-01-17 DIAGNOSIS — Z12.11 COLON CANCER SCREENING: Primary | ICD-10-CM

## 2019-01-17 PROCEDURE — 99213 OFFICE O/P EST LOW 20 MIN: CPT | Performed by: PHYSICIAN ASSISTANT

## 2019-01-17 ASSESSMENT — PAIN SCALES - GENERAL: PAINLEVEL: NO PAIN (0)

## 2019-01-17 NOTE — PATIENT INSTRUCTIONS
Thank you for choosing [facility].   I have office hours 8:00 am to 4:30 pm on Monday's, Wednesday's, Thursday's and Friday's. My nurse and I are out of the office every Tuesday.    Following your visit, when your labs and diagnostic testing have returned, I will review then and you will be contacted by my nurse.  If you are on My Chart, you can also view results there.    For refills, notify your pharmacy regarding what you need and the pharmacy will generate a refill request. Do not call my nurse as she is unable to process refill request. Please plan ahead and allow 3-5 days for refill requests.    You will generally receive a reminder call the day prior to your appointment.  If you cannot attend your appointment, please cancel your appointment with as much notice as possible.  If there is a pattern of failure to present for your appointments, I cannot provide consistent, meaningful, ongoing care for you. It is very important to me that you come in for your care, so we can best assist you with your health care needs.    IMPORTANT:  Please note that it is my standard of practice to NOT participate in prescribing ongoing requested Narcotic Analgesic therapy, and/or participate in the prescribing of other controlled substances.  My nurse and I am happy to assist you with the process of referral for alternative pain management as needed, and other treatment modalities including but not limited to:  Physical Therapy, Physical Medicine and Rehab, Counseling, Chiropractic Care, Orthopedic Care, and non-narcotic medication management.     In the event that you need to be seen for emergent concerns and I am out of office,  please see one of my colleagues for acute concerns.  You may also present to  or ER.  I appreciate the opportunity to serve you and look forward to supporting your healthcare needs in the future. Please contact me with any questions or concerns that you may  have.    Sincerely,      Toyin Puri RN, PA-C

## 2019-01-17 NOTE — NURSING NOTE
"Chief Complaint   Patient presents with     Abdominal Pain       Initial /74   Pulse 94   Wt 66.2 kg (146 lb)   SpO2 98%   BMI 24.30 kg/m   Estimated body mass index is 24.3 kg/m  as calculated from the following:    Height as of 1/3/19: 1.651 m (5' 5\").    Weight as of this encounter: 66.2 kg (146 lb).  Medication Reconciliation: complete    Anny Allison LPN  "

## 2019-01-29 ENCOUNTER — MYC MEDICAL ADVICE (OUTPATIENT)
Dept: FAMILY MEDICINE | Facility: OTHER | Age: 59
End: 2019-01-29

## 2019-01-29 DIAGNOSIS — K57.32 DIVERTICULITIS OF COLON: Primary | ICD-10-CM

## 2019-01-30 RX ORDER — CIPROFLOXACIN 500 MG/1
500 TABLET, FILM COATED ORAL 2 TIMES DAILY
Qty: 20 TABLET | Refills: 0 | Status: SHIPPED | OUTPATIENT
Start: 2019-01-30 | End: 2019-04-04

## 2019-04-04 ENCOUNTER — APPOINTMENT (OUTPATIENT)
Dept: CT IMAGING | Facility: HOSPITAL | Age: 59
End: 2019-04-04
Attending: PHYSICIAN ASSISTANT
Payer: COMMERCIAL

## 2019-04-04 ENCOUNTER — HOSPITAL ENCOUNTER (EMERGENCY)
Facility: HOSPITAL | Age: 59
Discharge: HOME | End: 2019-04-04
Attending: PHYSICIAN ASSISTANT | Admitting: PHYSICIAN ASSISTANT
Payer: COMMERCIAL

## 2019-04-04 VITALS
TEMPERATURE: 97.9 F | BODY MASS INDEX: 23.82 KG/M2 | OXYGEN SATURATION: 97 % | WEIGHT: 143 LBS | DIASTOLIC BLOOD PRESSURE: 63 MMHG | HEIGHT: 65 IN | RESPIRATION RATE: 16 BRPM | SYSTOLIC BLOOD PRESSURE: 116 MMHG

## 2019-04-04 DIAGNOSIS — K57.32 SIGMOID DIVERTICULITIS: ICD-10-CM

## 2019-04-04 LAB
ALBUMIN UR-MCNC: NEGATIVE MG/DL
ANION GAP SERPL CALCULATED.3IONS-SCNC: 7 MMOL/L (ref 3–14)
APPEARANCE UR: CLEAR
BACTERIA #/AREA URNS HPF: ABNORMAL /HPF
BASOPHILS # BLD AUTO: 0 10E9/L (ref 0–0.2)
BASOPHILS NFR BLD AUTO: 0.2 %
BILIRUB UR QL STRIP: NEGATIVE
BUN SERPL-MCNC: 9 MG/DL (ref 7–30)
CALCIUM SERPL-MCNC: 9.3 MG/DL (ref 8.5–10.1)
CHLORIDE SERPL-SCNC: 105 MMOL/L (ref 94–109)
CO2 SERPL-SCNC: 27 MMOL/L (ref 20–32)
COLOR UR AUTO: ABNORMAL
CREAT SERPL-MCNC: 0.77 MG/DL (ref 0.52–1.04)
DIFFERENTIAL METHOD BLD: NORMAL
EOSINOPHIL # BLD AUTO: 0.2 10E9/L (ref 0–0.7)
EOSINOPHIL NFR BLD AUTO: 1.7 %
ERYTHROCYTE [DISTWIDTH] IN BLOOD BY AUTOMATED COUNT: 13.1 % (ref 10–15)
GFR SERPL CREATININE-BSD FRML MDRD: 84 ML/MIN/{1.73_M2}
GLUCOSE SERPL-MCNC: 96 MG/DL (ref 70–99)
GLUCOSE UR STRIP-MCNC: NEGATIVE MG/DL
HCT VFR BLD AUTO: 42.1 % (ref 35–47)
HGB BLD-MCNC: 13.8 G/DL (ref 11.7–15.7)
HGB UR QL STRIP: ABNORMAL
HYALINE CASTS #/AREA URNS LPF: 1 /LPF
IMM GRANULOCYTES # BLD: 0 10E9/L (ref 0–0.4)
IMM GRANULOCYTES NFR BLD: 0.2 %
KETONES UR STRIP-MCNC: NEGATIVE MG/DL
LEUKOCYTE ESTERASE UR QL STRIP: NEGATIVE
LYMPHOCYTES # BLD AUTO: 0.9 10E9/L (ref 0.8–5.3)
LYMPHOCYTES NFR BLD AUTO: 10.6 %
MCH RBC QN AUTO: 30.9 PG (ref 26.5–33)
MCHC RBC AUTO-ENTMCNC: 32.8 G/DL (ref 31.5–36.5)
MCV RBC AUTO: 94 FL (ref 78–100)
MONOCYTES # BLD AUTO: 0.5 10E9/L (ref 0–1.3)
MONOCYTES NFR BLD AUTO: 6 %
NEUTROPHILS # BLD AUTO: 7.1 10E9/L (ref 1.6–8.3)
NEUTROPHILS NFR BLD AUTO: 81.3 %
NITRATE UR QL: NEGATIVE
NRBC # BLD AUTO: 0 10*3/UL
NRBC BLD AUTO-RTO: 0 /100
PH UR STRIP: 6.5 PH (ref 4.7–8)
PLATELET # BLD AUTO: 158 10E9/L (ref 150–450)
POTASSIUM SERPL-SCNC: 3.7 MMOL/L (ref 3.4–5.3)
RBC # BLD AUTO: 4.47 10E12/L (ref 3.8–5.2)
RBC #/AREA URNS AUTO: 2 /HPF (ref 0–2)
SODIUM SERPL-SCNC: 139 MMOL/L (ref 133–144)
SOURCE: ABNORMAL
SP GR UR STRIP: 1.01 (ref 1–1.03)
UROBILINOGEN UR STRIP-MCNC: NORMAL MG/DL (ref 0–2)
WBC # BLD AUTO: 8.7 10E9/L (ref 4–11)
WBC #/AREA URNS AUTO: 1 /HPF (ref 0–5)

## 2019-04-04 PROCEDURE — 74177 CT ABD & PELVIS W/CONTRAST: CPT | Mod: TC

## 2019-04-04 PROCEDURE — 25500064 ZZH RX 255 OP 636: Performed by: RADIOLOGY

## 2019-04-04 PROCEDURE — 80048 BASIC METABOLIC PNL TOTAL CA: CPT | Performed by: PHYSICIAN ASSISTANT

## 2019-04-04 PROCEDURE — 99285 EMERGENCY DEPT VISIT HI MDM: CPT | Mod: Z6 | Performed by: PHYSICIAN ASSISTANT

## 2019-04-04 PROCEDURE — 85025 COMPLETE CBC W/AUTO DIFF WBC: CPT | Performed by: PHYSICIAN ASSISTANT

## 2019-04-04 PROCEDURE — 99285 EMERGENCY DEPT VISIT HI MDM: CPT | Mod: 25

## 2019-04-04 PROCEDURE — 81001 URINALYSIS AUTO W/SCOPE: CPT | Performed by: PHYSICIAN ASSISTANT

## 2019-04-04 PROCEDURE — 36415 COLL VENOUS BLD VENIPUNCTURE: CPT | Performed by: PHYSICIAN ASSISTANT

## 2019-04-04 RX ORDER — CHOLECALCIFEROL (VITAMIN D3) 50 MCG
1 TABLET ORAL DAILY
COMMUNITY

## 2019-04-04 RX ADMIN — IOHEXOL 100 ML: 300 INJECTION, SOLUTION INTRAVENOUS at 11:14

## 2019-04-04 ASSESSMENT — ENCOUNTER SYMPTOMS
BLOOD IN STOOL: 0
FEVER: 0
DIFFICULTY URINATING: 0
COLOR CHANGE: 0
CONFUSION: 0
VOMITING: 0
DIARRHEA: 0
SHORTNESS OF BREATH: 0
DYSURIA: 0
NAUSEA: 1
HEMATURIA: 0
ABDOMINAL PAIN: 1
EYE REDNESS: 0
LIGHT-HEADEDNESS: 0

## 2019-04-04 ASSESSMENT — MIFFLIN-ST. JEOR: SCORE: 1224.52

## 2019-04-04 NOTE — ED PROVIDER NOTES
"  History     Chief Complaint   Patient presents with     Abdominal Pain     x 2 days. hx diverticulitis. c/o \"burning abdominal pain.\" c/o RLQ pain today.      HPI  Patrizia Delcid is a 58 year old female who presents emergency department with complaints of 2 out of 10 right lower quadrant pain at rest and severe pain at worst which is aggravated by movement and walking and lifting of the right leg.  Patient notes associated nausea but denies fever, vomiting, diarrhea, bloody stools, urinary symptoms, vaginal discharge, chest pain, shortness of breath, skin changes.  She describes the pain is burning in the lower abdomen.  Patient did have a normal bowel movement this morning.  She has a history of cholecystectomy and total hysterectomy.  Pain is improved with lying still.  She has not taken anything for the pain.  Patient denies personal or family history of kidney stones.  She has prior history of diverticulitis but still has her appendix.    Allergies:  Allergies   Allergen Reactions     Codeine Nausea       Problem List:    Patient Active Problem List    Diagnosis Date Noted     Rheumatoid arthritis (H) 01/03/2019     Priority: Medium     Elevated rheumatoid factor 11/13/2017     Priority: Medium     Vitamin D deficiency disease 11/13/2017     Priority: Medium     Primary osteoarthritis of right shoulder 09/07/2016     Priority: Medium     ACP (advance care planning) 04/06/2016     Priority: Medium     Advance Care Planning 4/6/2016: ACP Review of Chart / Resources Provided:  Reviewed chart for advance care plan.  Patrizia Delcid has been provided information and resources to begin or update their advance care plan.  Added by Maritza Manuel                Past Medical History:    Past Medical History:   Diagnosis Date     Diverticulitis of colon (without mention of hemorrhage)(562.11) 12/08/2010     Endometriosis 09/12/2011     Fibrocystic change of Breast 09/12/2011     Gilbert Disease 09/12/2011     " Herpes zoster without mention of complication 12/08/2010     Hormone replacement therapy (postmenopausal) 12/08/2010     Primary osteoarthritis of right shoulder 9/7/2016     Symptomatic states associated with artificial menopause 12/08/2010       Past Surgical History:    Past Surgical History:   Procedure Laterality Date     BIOPSY      breast biopsy     CHOLECYSTECTOMY       COLONOSCOPY  02/17/2011     HYSTERECTOMY TOTAL ABDOMINAL, BILATERAL SALPINGO-OOPHORECTOMY, COMBINED N/A     vaginal vs abdominal hyst?     LEFT LUMPECTOMY      Benign       Family History:    Family History   Problem Relation Age of Onset     Myocardial Infarction Mother      C.A.D. Mother      Heart Disease Father      Cancer Father         LUNG     C.A.D. Father      Cancer Brother         LUNG     Myocardial Infarction Other         MYOCARDIAL INFARCTION     Cancer Brother         TESTICULAR     Breast Cancer Other      Myocardial Infarction Other         MYOCARDIAL INFARCTION       Social History:  Marital Status:   [2]  Social History     Tobacco Use     Smoking status: Former Smoker     Types: Cigarettes     Smokeless tobacco: Never Used     Tobacco comment: Tried to Quit (YES); YR QUIT (1980); Passive Exposure (NO)   Substance Use Topics     Alcohol use: Yes     Alcohol/week: 0.0 oz     Comment: RARELY     Drug use: No        Medications:      amoxicillin-clavulanate (AUGMENTIN) 875-125 MG tablet   cetirizine (ZYRTEC ALLERGY) 10 MG tablet   Probiotic Product (PROBIOTIC DAILY PO)   sennosides (SENOKOT) 8.6 MG tablet   vitamin D3 (CHOLECALCIFEROL) 2000 units tablet   conjugated estrogens (PREMARIN) cream   cyclobenzaprine (FLEXERIL) 10 MG tablet   valACYclovir (VALTREX) 1000 mg tablet         Review of Systems   Constitutional: Negative for fever.   HENT: Negative for congestion.    Eyes: Negative for redness.   Respiratory: Negative for shortness of breath.    Cardiovascular: Negative for chest pain.   Gastrointestinal: Positive  "for abdominal pain and nausea. Negative for blood in stool, diarrhea and vomiting.   Genitourinary: Negative for difficulty urinating, dysuria and hematuria.   Skin: Negative for color change.   Neurological: Negative for light-headedness.   Psychiatric/Behavioral: Negative for confusion.       Physical Exam   BP: 111/57  Heart Rate: 87  Temp: 98  F (36.7  C)  Resp: 16  Height: 165.1 cm (5' 5\")  Weight: 64.9 kg (143 lb)  SpO2: 100 %      Physical Exam   Constitutional: She appears well-developed and well-nourished. No distress.   HENT:   Head: Atraumatic.   Mouth/Throat: Oropharynx is clear and moist. No oropharyngeal exudate.   Eyes: Conjunctivae and EOM are normal. Pupils are equal, round, and reactive to light. No scleral icterus.   Neck: Normal range of motion.   Cardiovascular: Normal rate, normal heart sounds and intact distal pulses.   Pulmonary/Chest: Breath sounds normal. No respiratory distress.   Abdominal: Soft. Bowel sounds are normal. There is no tenderness.   Significant tenderness with guarding in RLQ, moderated tenderness in LLQ.  No upper abdominal tenderness to palpation.     Musculoskeletal: She exhibits no edema or tenderness.   Skin: Skin is warm. No rash noted. She is not diaphoretic.   Nursing note and vitals reviewed.      ED Course            Results for orders placed or performed during the hospital encounter of 04/04/19 (from the past 24 hour(s))   CBC with platelets differential   Result Value Ref Range    WBC 8.7 4.0 - 11.0 10e9/L    RBC Count 4.47 3.8 - 5.2 10e12/L    Hemoglobin 13.8 11.7 - 15.7 g/dL    Hematocrit 42.1 35.0 - 47.0 %    MCV 94 78 - 100 fl    MCH 30.9 26.5 - 33.0 pg    MCHC 32.8 31.5 - 36.5 g/dL    RDW 13.1 10.0 - 15.0 %    Platelet Count 158 150 - 450 10e9/L    Diff Method Automated Method     % Neutrophils 81.3 %    % Lymphocytes 10.6 %    % Monocytes 6.0 %    % Eosinophils 1.7 %    % Basophils 0.2 %    % Immature Granulocytes 0.2 %    Nucleated RBCs 0 0 /100    " Absolute Neutrophil 7.1 1.6 - 8.3 10e9/L    Absolute Lymphocytes 0.9 0.8 - 5.3 10e9/L    Absolute Monocytes 0.5 0.0 - 1.3 10e9/L    Absolute Eosinophils 0.2 0.0 - 0.7 10e9/L    Absolute Basophils 0.0 0.0 - 0.2 10e9/L    Abs Immature Granulocytes 0.0 0 - 0.4 10e9/L    Absolute Nucleated RBC 0.0    Basic metabolic panel   Result Value Ref Range    Sodium 139 133 - 144 mmol/L    Potassium 3.7 3.4 - 5.3 mmol/L    Chloride 105 94 - 109 mmol/L    Carbon Dioxide 27 20 - 32 mmol/L    Anion Gap 7 3 - 14 mmol/L    Glucose 96 70 - 99 mg/dL    Urea Nitrogen 9 7 - 30 mg/dL    Creatinine 0.77 0.52 - 1.04 mg/dL    GFR Estimate 84 >60 mL/min/[1.73_m2]    GFR Estimate If Black >90 >60 mL/min/[1.73_m2]    Calcium 9.3 8.5 - 10.1 mg/dL   UA reflex to Microscopic and Culture   Result Value Ref Range    Color Urine Light Yellow     Appearance Urine Clear     Glucose Urine Negative NEG^Negative mg/dL    Bilirubin Urine Negative NEG^Negative    Ketones Urine Negative NEG^Negative mg/dL    Specific Gravity Urine 1.011 1.003 - 1.035    Blood Urine Trace (A) NEG^Negative    pH Urine 6.5 4.7 - 8.0 pH    Protein Albumin Urine Negative NEG^Negative mg/dL    Urobilinogen mg/dL Normal 0.0 - 2.0 mg/dL    Nitrite Urine Negative NEG^Negative    Leukocyte Esterase Urine Negative NEG^Negative    Source Midstream Urine     RBC Urine 2 0 - 2 /HPF    WBC Urine 1 0 - 5 /HPF    Bacteria Urine Few (A) NEG^Negative /HPF    Hyaline Casts 1 (A) OTO2^O - 2 /LPF   CT Abdomen Pelvis w Contrast    Narrative    EXAMINATION: CT ABDOMEN PELVIS W CONTRAST, 4/4/2019 11:22 AM    TECHNIQUE:  Helical CT images from the lung bases through the  symphysis pubis were obtained  with IV contrast. Contrast dose: Isovue  300, 100ml    COMPARISON: none    HISTORY: Abd pain, appendicitis suspected    FINDINGS:    There is dependent atelectasis at the lung bases.    The liver is free of masses or biliary ductal enlargement. The  gallbladder has been removed    The the spleen and  pancreas appear normal.    The adrenal glands are normal.    The right and left kidneys are free of masses or hydronephrosis.    The periaortic lymph nodes are normal in caliber.    There is extensive edema surrounding the sigmoid colon with several  droplets of extraluminal gas consistent with diverticulitis. No  diverticular abscess is seen.    In the pelvis the bladder and rectum appear normal.    The regional skeleton is intact      Impression    IMPRESSION: Sigmoid diverticulitis with several droplets of  extraluminal gas but no abscess.     YA MICHEL MD       Medications   sodium chloride (PF) 0.9% PF flush 60 mL (60 mLs Intravenous Given 4/4/19 1114)   iohexol (OMNIPAQUE) 300 mg/mL injection 100 mL (100 mLs Intravenous Given 4/4/19 1114)       Assessments & Plan (with Medical Decision Making)     I have reviewed the nursing notes.    I have reviewed the findings, diagnosis, plan and need for follow up with the patient.  Symptoms concerning for possible appendicitis versus diverticulitis.  CT shows diverticulitis and no appendicitis.  No abscess present.  Vitals are stable, no elevated white count.  Prescription for Augmentin was provided.  Recommended clear liquid diet for the next 24 hours, advance diet as tolerated, and return to the emergency department severely worsening symptoms.       Medication List      Started    amoxicillin-clavulanate 875-125 MG tablet  Commonly known as:  AUGMENTIN  1 tablet, Oral, 2 TIMES DAILY            Final diagnoses:   Sigmoid diverticulitis     MYRA Chiang on 4/4/2019 at 11:52 AM   4/4/2019   [facility]      Kenji Baker PA  04/04/19 1151

## 2019-04-04 NOTE — ED NOTES
Patient arrives by self for evaluation of abdominal pain. Reporting burning sensation over pelvic area yesterday which resolved and turned into a sharp RLQ pain. Rates pain 2/10 while at rest, however is worse with any movement. Denies fevers. Mild nausea, no emesis. Denies changes in bowel/bladder habits. Reports history of diverticulitis. Call light within reach.

## 2019-04-04 NOTE — ED AVS SNAPSHOT
[facility]  [address] [address]  Phone:  [phone]                                    Patrizia Delcid   MRN: [id number]    Department:  [facility]   Date of Visit:  4/4/2019           After Visit Summary Signature Page    I have received my discharge instructions, and my questions have been answered. I have discussed any challenges I see with this plan with the nurse or doctor.    ..........................................................................................................................................  Patient/Patient Representative Signature      ..........................................................................................................................................  Patient Representative Print Name and Relationship to Patient    ..................................................               ................................................  Date                                   Time    ..........................................................................................................................................  Reviewed by Signature/Title    ...................................................              ..............................................  Date                                               Time          22EPIC Rev 08/18

## 2019-04-04 NOTE — ED NOTES
Discharge instructions given. Verbalized understanding. States pain is the same and rates 2/10. IV removed. Catheter intact. Coban applied. Instructed to remove coban in 25 minutes. Ambulated out of ED independently.

## 2019-04-04 NOTE — LETTER
April 4, 2019      To Whom It May Concern:      Patrizia Delcid was seen in our Emergency Department today, 04/04/19.  I expect her condition to improve over the next 1-2 days.  She may return to work/school when improved.    Sincerely,        Kenji Baker PA

## 2019-08-01 ENCOUNTER — TELEPHONE (OUTPATIENT)
Dept: FAMILY MEDICINE | Facility: OTHER | Age: 59
End: 2019-08-01

## 2019-08-01 ENCOUNTER — OFFICE VISIT (OUTPATIENT)
Dept: FAMILY MEDICINE | Facility: OTHER | Age: 59
End: 2019-08-01
Attending: PHYSICIAN ASSISTANT
Payer: COMMERCIAL

## 2019-08-01 VITALS
WEIGHT: 143 LBS | BODY MASS INDEX: 23.82 KG/M2 | OXYGEN SATURATION: 98 % | HEART RATE: 92 BPM | TEMPERATURE: 98.2 F | DIASTOLIC BLOOD PRESSURE: 84 MMHG | HEIGHT: 65 IN | SYSTOLIC BLOOD PRESSURE: 122 MMHG

## 2019-08-01 DIAGNOSIS — K12.30 STOMATITIS AND MUCOSITIS: Primary | ICD-10-CM

## 2019-08-01 DIAGNOSIS — K12.1 STOMATITIS AND MUCOSITIS: Primary | ICD-10-CM

## 2019-08-01 PROCEDURE — 99213 OFFICE O/P EST LOW 20 MIN: CPT | Performed by: PHYSICIAN ASSISTANT

## 2019-08-01 RX ORDER — CLOTRIMAZOLE 10 MG/1
10 LOZENGE ORAL
Qty: 50 TROCHE | Refills: 0 | Status: SHIPPED | OUTPATIENT
Start: 2019-08-01 | End: 2021-01-07

## 2019-08-01 ASSESSMENT — MIFFLIN-ST. JEOR: SCORE: 1219.52

## 2019-08-01 ASSESSMENT — PAIN SCALES - GENERAL: PAINLEVEL: NO PAIN (0)

## 2019-08-01 NOTE — NURSING NOTE
"Chief Complaint   Patient presents with     Throat Problem       Initial /84 (BP Location: Right arm, Patient Position: Sitting, Cuff Size: Adult Regular)   Pulse 92   Temp 98.2  F (36.8  C) (Tympanic)   Ht 1.651 m (5' 5\")   Wt 64.9 kg (143 lb)   SpO2 98%   BMI 23.80 kg/m   Estimated body mass index is 23.8 kg/m  as calculated from the following:    Height as of this encounter: 1.651 m (5' 5\").    Weight as of this encounter: 64.9 kg (143 lb).  Medication Reconciliation: complete  "

## 2019-08-01 NOTE — PATIENT INSTRUCTIONS
Thank you for choosing [facility].   I have office hours 8:00 am to 4:30 pm on Monday's, Wednesday's, Thursday's and Friday's. My nurse and I are out of the office every Tuesday.    Following your visit, when your labs and diagnostic testing have returned, I will review then and you will be contacted by my nurse.  If you are on My Chart, you can also view results there.    For refills, notify your pharmacy regarding what you need and the pharmacy will generate a refill request. Do not call my nurse as she is unable to process refill request. Please plan ahead and allow 3-5 days for refill requests.    You will generally receive a reminder call the day prior to your appointment.  If you cannot attend your appointment, please cancel your appointment with as much notice as possible.  If there is a pattern of failure to present for your appointments, I cannot provide consistent, meaningful, ongoing care for you. It is very important to me that you come in for your care, so we can best assist you with your health care needs.    IMPORTANT:  Please note that it is my standard of practice to NOT participate in prescribing ongoing requested Narcotic Analgesic therapy, and/or participate in the prescribing of other controlled substances.  My nurse and I am happy to assist you with the process of referral for alternative pain management as needed, and other treatment modalities including but not limited to:  Physical Therapy, Physical Medicine and Rehab, Counseling, Chiropractic Care, Orthopedic Care, and non-narcotic medication management.     In the event that you need to be seen for emergent concerns and I am out of office,  please see one of my colleagues for acute concerns.  You may also present to  or ER.  I appreciate the opportunity to serve you and look forward to supporting your healthcare needs in the future. Please contact me with any questions or concerns that you may  have.    Sincerely,      Toyin Puri RN, PA-C

## 2019-08-01 NOTE — TELEPHONE ENCOUNTER
"Patient called reports having a colonoscopy on the 7/22/19. Pt report about two days after the colonoscopy she started to experience \"thickness in throat/itchy throat and body aches\". Pt states symptoms are still present, but do not cause SOB and/or difficulty breathing. Pt was advised if she starts experiencing this to call 911.   Pt also reports having white coating on cheeks and pt did report she experienced the similar symptoms with her throat discomfort last fall with her shoulder injections.     Pt questioning \"what is causing this?\" \"should I be seen?\"    PCP to advise on plan.       PCP updated in person. Pt requesting an appointment.  Pt scheduled today at 4 PM. Time approved by provider.   "

## 2019-08-01 NOTE — PROGRESS NOTES
Subjective     Patrizia Delcid is a 59 year old female who presents to clinic today for the following health issues:    HPI   Throat problem      Duration: 1 week    Description (location/character/radiation): Throat    Intensity:  mild    Accompanying signs and symptoms: Feels cloggy    History (similar episodes/previous evaluation): None    Precipitating or alleviating factors: None    Therapies tried and outcome: None         Patient Active Problem List   Diagnosis     ACP (advance care planning)     Primary osteoarthritis of right shoulder     Elevated rheumatoid factor     Vitamin D deficiency disease     Rheumatoid arthritis (H)     Past Surgical History:   Procedure Laterality Date     BIOPSY      breast biopsy     CHOLECYSTECTOMY       COLONOSCOPY  02/17/2011     HYSTERECTOMY TOTAL ABDOMINAL, BILATERAL SALPINGO-OOPHORECTOMY, COMBINED N/A     vaginal vs abdominal hyst?     LEFT LUMPECTOMY      Benign       Social History     Tobacco Use     Smoking status: Former Smoker     Types: Cigarettes     Smokeless tobacco: Never Used     Tobacco comment: Tried to Quit (YES); YR QUIT (1980); Passive Exposure (NO)   Substance Use Topics     Alcohol use: Yes     Alcohol/week: 0.0 oz     Comment: RARELY     Family History   Problem Relation Age of Onset     Myocardial Infarction Mother      C.A.D. Mother      Heart Disease Father      Cancer Father         LUNG     C.A.D. Father      Cancer Brother         LUNG     Myocardial Infarction Other         MYOCARDIAL INFARCTION     Cancer Brother         TESTICULAR     Breast Cancer Other      Myocardial Infarction Other         MYOCARDIAL INFARCTION         Current Outpatient Medications   Medication Sig Dispense Refill     cetirizine (ZYRTEC ALLERGY) 10 MG tablet Take 10 mg by mouth daily as needed.       conjugated estrogens (PREMARIN) cream Insert/apply Finger tip full nighlty for 1 week then once a week as needed 30 g 1     cyclobenzaprine (FLEXERIL) 10 MG tablet Take  "0.5-1 tablets (5-10 mg) by mouth 3 times daily as needed for muscle spasms 30 tablet 1     Probiotic Product (PROBIOTIC DAILY PO)        sennosides (SENOKOT) 8.6 MG tablet Take 1 tablet by mouth daily       valACYclovir (VALTREX) 1000 mg tablet TAKE 1 TABLET BY MOUTH 3 TIMES DAILY (Patient taking differently: TAKE 1 TABLET BY MOUTH 3 TIMES DAILY PRN) 21 tablet 1     vitamin D3 (CHOLECALCIFEROL) 2000 units tablet Take 1 tablet by mouth daily       Allergies   Allergen Reactions     Codeine Nausea     Recent Labs   Lab Test 04/04/19  0931 01/03/19  1452 02/17/18  1158  10/27/16  1608 12/28/13  1939 11/04/13  0822   LDL  --   --   --   --   --   --  150*   HDL  --   --   --   --   --   --  43   TRIG  --   --   --   --   --   --  154*   ALT  --   --  36  --  36 31 36   CR 0.77 0.72 0.77   < > 0.73 0.94 0.94   GFRESTIMATED 84 >90 77   < > 82 62 62   GFRESTBLACK >90 >90 >90   < > >90   GFR Calc   75 75   POTASSIUM 3.7 4.2 3.6   < > 4.0 3.6 4.7   TSH  --   --   --   --  1.37  --  2.21    < > = values in this interval not displayed.          Reviewed and updated as needed this visit by Provider         Review of Systems   ROS COMP: Constitutional, HEENT, cardiovascular, pulmonary, gi and gu systems are negative, except as otherwise noted.      Objective    /84 (BP Location: Right arm, Patient Position: Sitting, Cuff Size: Adult Regular)   Pulse 92   Temp 98.2  F (36.8  C) (Tympanic)   Ht 1.651 m (5' 5\")   Wt 64.9 kg (143 lb)   SpO2 98%   BMI 23.80 kg/m    Body mass index is 23.8 kg/m .  Physical Exam   GENERAL: healthy, alert and no distress  EYES: Eyes grossly normal to inspection, PERRL and conjunctivae and sclerae normal  HENT: ear canals and TM's normal, nose and mouth without ulcers or lesions  NECK: no adenopathy, no asymmetry, masses, or scars and thyroid normal to palpation  RESP: lungs clear to auscultation - no rales, rhonchi or wheezes  CV: regular rate and rhythm, normal S1 S2, no S3 " or S4, no murmur, click or rub, no peripheral edema and peripheral pulses strong  ABDOMEN: soft, nontender, no hepatosplenomegaly, no masses and bowel sounds normal  MS: no gross musculoskeletal defects noted, no edema  PSYCH: mentation appears normal, affect normal/bright    Diagnostic Test Results:  Labs reviewed in Epic  No results found for this or any previous visit (from the past 24 hour(s)).        Assessment & Plan     1. Stomatitis and mucositis  We will try this. Related to sensitivity I believe.   - clotrimazole 10 MG garfield; Take 1 Garfield (10 mg) by mouth 5 times daily  Dispense: 50 Garfield; Refill: 0       See Patient Instructions    No follow-ups on file.    MYRA Tsang  [facility] - [facility]

## 2019-10-24 ENCOUNTER — ANCILLARY PROCEDURE (OUTPATIENT)
Dept: MAMMOGRAPHY | Facility: OTHER | Age: 59
End: 2019-10-24
Attending: PHYSICIAN ASSISTANT
Payer: COMMERCIAL

## 2019-10-24 DIAGNOSIS — Z12.31 VISIT FOR SCREENING MAMMOGRAM: ICD-10-CM

## 2019-10-24 PROCEDURE — 77067 SCR MAMMO BI INCL CAD: CPT | Mod: TC

## 2019-10-24 PROCEDURE — 77063 BREAST TOMOSYNTHESIS BI: CPT | Mod: TC

## 2019-11-01 ENCOUNTER — HOSPITAL ENCOUNTER (EMERGENCY)
Facility: HOSPITAL | Age: 59
Discharge: HOME | End: 2019-11-01
Attending: NURSE PRACTITIONER | Admitting: NURSE PRACTITIONER
Payer: COMMERCIAL

## 2019-11-01 ENCOUNTER — APPOINTMENT (OUTPATIENT)
Dept: GENERAL RADIOLOGY | Facility: HOSPITAL | Age: 59
End: 2019-11-01
Attending: NURSE PRACTITIONER
Payer: COMMERCIAL

## 2019-11-01 VITALS
DIASTOLIC BLOOD PRESSURE: 84 MMHG | OXYGEN SATURATION: 100 % | HEART RATE: 65 BPM | RESPIRATION RATE: 16 BRPM | TEMPERATURE: 97.5 F | SYSTOLIC BLOOD PRESSURE: 131 MMHG

## 2019-11-01 DIAGNOSIS — L91.8 SKIN TAG: ICD-10-CM

## 2019-11-01 DIAGNOSIS — R05.9 COUGH: Primary | ICD-10-CM

## 2019-11-01 DIAGNOSIS — S61.412A LACERATION OF LEFT HAND WITHOUT FOREIGN BODY, INITIAL ENCOUNTER: ICD-10-CM

## 2019-11-01 PROCEDURE — G0463 HOSPITAL OUTPT CLINIC VISIT: HCPCS | Mod: 25

## 2019-11-01 PROCEDURE — 11200 RMVL SKIN TAGS UP TO&INC 15: CPT

## 2019-11-01 PROCEDURE — 11200 RMVL SKIN TAGS UP TO&INC 15: CPT | Performed by: NURSE PRACTITIONER

## 2019-11-01 PROCEDURE — 99213 OFFICE O/P EST LOW 20 MIN: CPT | Mod: Z6 | Performed by: NURSE PRACTITIONER

## 2019-11-01 PROCEDURE — 71046 X-RAY EXAM CHEST 2 VIEWS: CPT | Mod: TC

## 2019-11-01 RX ORDER — PREDNISONE 20 MG/1
TABLET ORAL
Qty: 10 TABLET | Refills: 0 | Status: SHIPPED | OUTPATIENT
Start: 2019-11-01 | End: 2021-01-07

## 2019-11-01 NOTE — ED PROVIDER NOTES
History     Chief Complaint   Patient presents with     Cold Symptoms     x 1-2 weeks. nasal congestion, cough, tickle in my throat. denies fever or chills.      Hand Injury     cut ring finger on left hand yesterday(approx 1230) on serrated knife.     HPI  Patrizia Delcid is a 59 year old female who presents ambulatory with respiratory symptoms of rhinorrhea, nasal congestion, dry scratchy throat, cough for the last 1 to 2 weeks.  Denies fever, chills, generalized body aches but she does have aching in her hips which usually happens when she is sick. Mucinex last night - helped cough last night but made her eyes blurry. Saline nasal irrigation - she does this daily, has not been helping current upper respiratory symptoms. She has not tried anything else.   History of smoking for about 20 years, 1/2 ppd. Quit smoking in her 30s.     She also has a cut on her left ring finger.  Yesterday when she is getting her bagel she got the end of her finger.  She has been using Band-Aids.  Would like checked out today.    She also has a skin tag to her left rib right where bra breast.  This skin tag is frequently irritated and causes discomfort.  She would like removed today.    Allergies:  Allergies   Allergen Reactions     Codeine Nausea       Problem List:    Patient Active Problem List    Diagnosis Date Noted     Rheumatoid arthritis (H) 01/03/2019     Priority: Medium     Elevated rheumatoid factor 11/13/2017     Priority: Medium     Vitamin D deficiency disease 11/13/2017     Priority: Medium     Primary osteoarthritis of right shoulder 09/07/2016     Priority: Medium     ACP (advance care planning) 04/06/2016     Priority: Medium     Advance Care Planning 4/6/2016: ACP Review of Chart / Resources Provided:  Reviewed chart for advance care plan.  Patrizia Delcid has been provided information and resources to begin or update their advance care plan.  Added by Maritza Manuel                Past Medical History:     Past Medical History:   Diagnosis Date     Diverticulitis of colon (without mention of hemorrhage)(562.11) 12/08/2010     Endometriosis 09/12/2011     Fibrocystic change of Breast 09/12/2011     Gilbert Disease 09/12/2011     Herpes zoster without mention of complication 12/08/2010     Hormone replacement therapy (postmenopausal) 12/08/2010     Primary osteoarthritis of right shoulder 9/7/2016     Symptomatic states associated with artificial menopause 12/08/2010       Past Surgical History:    Past Surgical History:   Procedure Laterality Date     BIOPSY      breast biopsy     CHOLECYSTECTOMY       COLONOSCOPY  02/17/2011     HYSTERECTOMY TOTAL ABDOMINAL, BILATERAL SALPINGO-OOPHORECTOMY, COMBINED N/A     vaginal vs abdominal hyst?     LEFT LUMPECTOMY      Benign       Family History:    Family History   Problem Relation Age of Onset     Myocardial Infarction Mother      C.A.D. Mother      Heart Disease Father      Cancer Father         LUNG     C.A.D. Father      Cancer Brother         LUNG     Myocardial Infarction Other         MYOCARDIAL INFARCTION     Cancer Brother         TESTICULAR     Breast Cancer Other      Myocardial Infarction Other         MYOCARDIAL INFARCTION       Social History:  Marital Status:   [2]  Social History     Tobacco Use     Smoking status: Former Smoker     Types: Cigarettes     Smokeless tobacco: Never Used     Tobacco comment: Tried to Quit (YES); YR QUIT (1980); Passive Exposure (NO)   Substance Use Topics     Alcohol use: Yes     Alcohol/week: 0.0 standard drinks     Comment: RARELY     Drug use: No        Medications:    predniSONE (DELTASONE) 20 MG tablet  cetirizine (ZYRTEC ALLERGY) 10 MG tablet  clotrimazole 10 MG deborah  conjugated estrogens (PREMARIN) cream  cyclobenzaprine (FLEXERIL) 10 MG tablet  Probiotic Product (PROBIOTIC DAILY PO)  sennosides (SENOKOT) 8.6 MG tablet  valACYclovir (VALTREX) 1000 mg tablet  vitamin D3 (CHOLECALCIFEROL) 2000 units  tablet          Review of Systems   Constitutional: Negative for chills and fever.   Respiratory: Positive for cough.    Musculoskeletal: Negative for arthralgias and myalgias.   Skin: Positive for wound (finger).   All other systems reviewed and are negative.      Physical Exam   BP: 131/84  Pulse: 65  Temp: 97.5  F (36.4  C)  Resp: 16  SpO2: 100 %      Physical Exam  Constitutional:       General: She is not in acute distress.     Appearance: Normal appearance. She is not ill-appearing, toxic-appearing or diaphoretic.   HENT:      Head: Normocephalic and atraumatic.      Right Ear: Tympanic membrane, ear canal and external ear normal.      Left Ear: Tympanic membrane, ear canal and external ear normal.      Nose: Nose normal.      Mouth/Throat:      Lips: Pink.      Mouth: Mucous membranes are moist.      Pharynx: Oropharynx is clear. Uvula midline. No pharyngeal swelling, oropharyngeal exudate or posterior oropharyngeal erythema.   Eyes:      General: Lids are normal.      Conjunctiva/sclera: Conjunctivae normal.      Pupils: Pupils are equal, round, and reactive to light.   Neck:      Musculoskeletal: Neck supple.   Cardiovascular:      Rate and Rhythm: Normal rate and regular rhythm.      Heart sounds: S1 normal and S2 normal. No murmur. No friction rub. No gallop.    Pulmonary:      Effort: Pulmonary effort is normal. No tachypnea or respiratory distress.      Breath sounds: Normal breath sounds. No wheezing, rhonchi or rales.   Musculoskeletal:      Left hand: She exhibits decreased range of motion and laceration. She exhibits no tenderness, normal capillary refill and no swelling. Normal sensation noted.        Hands:    Lymphadenopathy:      Cervical: No cervical adenopathy.   Skin:     General: Skin is warm and dry.      Capillary Refill: Capillary refill takes less than 2 seconds.      Coloration: Skin is not pale.      Findings: No rash.          Neurological:      Mental Status: She is alert and  oriented to person, place, and time.   Psychiatric:         Mood and Affect: Mood normal.         Speech: Speech normal.         Behavior: Behavior normal. Behavior is cooperative.         ED Course        Procedures               Results for orders placed or performed during the hospital encounter of 11/01/19 (from the past 24 hour(s))   Chest XR,  PA & LAT    Narrative    PROCEDURE:  XR CHEST 2 VW    HISTORY:  cough x 7 days.     COMPARISON:  None.    FINDINGS:   The cardiac silhouette is normal in size. The pulmonary vasculature is  normal.  The lungs are clear. No pleural effusion or pneumothorax.      Impression    IMPRESSION:  No acute cardiopulmonary disease.      YA MICHEL MD       Medications - No data to display    Assessments & Plan (with Medical Decision Making)     I have reviewed the nursing notes.    I have reviewed the findings, diagnosis, plan and need for follow up with the patient.  (R05) Cough  (primary encounter diagnosis)  Comment: acute, symptomatic   Plan: Suspecting could be related to allergy and/or viral. Can trial prednisone 40 mg daily x 5 days.  Continue with symptomatic treatments  -- Symptomatic treatments recommended.  -Discussed that antibiotics would not help symptoms of viral URI. Education provided on symptoms of secondary bacterial infection such as new fever, chills, rigors, shortness of breath, increased work of breathing, that can occur with viral URI and need for further evaluation, if they occur.   - Ensure you are staying hydrated by drinking plenty of fluids or eating foods such as popsicles, jello, pudding.  - Honey can be soothing for sore throat  - Warm salt water gurgles can help soothe sore throat  - Rest  - Humidifier can help with congestion and help keep mucus membranes such as throat and nose from drying out.  - Sleeping slightly propped up can help with congestion and postnasal drainage that can worsen cough at bedtime.  - As long as you have never been  told to take Tylenol and/or Ibuprofen you can use them to manage fever and body aches per package instructions  Make sure you eat when you take ibuprofen to avoid stomach upset.  - OTC cough medications per package instructions to help with cough. Check to see if the cough/cold medication already has acetaminophen (Tylenol) in it. If it does avoid taking additional Tylenol.  - If sudden onset of new fever, worsening symptoms return for further evaluation.  - OTC nasal steroid such as Flonase can help decrease sinus inflammation to help with congestion.  - Education provided on symptoms of post-viral bacterial infections including ear infection and pneumonia. This would require re-evaluation for treatment.      (L91.8) Skin tag  Comment: Chronic with intermittent periods of irritations/discomfort  Plan: Risks and benefits of removal discussed including infection. She wanted to proceed with removal. She declined anesthesia. Area cleansed with chlorhexidine, skin tag pulled with tweezers and snipped with scissors from suture removal kit. Covered with Band-Aid.  She tolerated removal with minimal bleeding, no discomfort.   Education on s/s of infection requiring further evaluation discussed    (S61.412A) Laceration of left hand without foreign body, initial encounter  Comment: Acute, symptomatmic  Plan: Healing - no signs or symptoms of infection. Continue with keeping clean and dry  Return with any symptoms of infection.        Discharge Medication List as of 11/1/2019 11:19 AM          Final diagnoses:   Cough   Skin tag   Laceration of left hand without foreign body, initial encounter     Leilani Knight CNP   11/1/2019   [facility]     Leilani Knight CNP  11/01/19 [id number]       Leilani Knight CNP  11/04/19 [id number]

## 2019-11-01 NOTE — DISCHARGE INSTRUCTIONS
(R05) Cough  (primary encounter diagnosis)  Comment: acute, symptomatic   Plan: Suspecting could be related to allergy and/or viral. Can trial prednisone 40 mg daily x 5 days.  Continue with symptomatic treatments  -- Symptomatic treatments recommended.  -Discussed that antibiotics would not help symptoms of viral URI. Education provided on symptoms of secondary bacterial infection such as new fever, chills, rigors, shortness of breath, increased work of breathing, that can occur with viral URI and need for further evaluation, if they occur.   - Ensure you are staying hydrated by drinking plenty of fluids or eating foods such as popsicles, jello, pudding.  - Honey can be soothing for sore throat  - Warm salt water gurgles can help soothe sore throat  - Rest  - Humidifier can help with congestion and help keep mucus membranes such as throat and nose from drying out.  - Sleeping slightly propped up can help with congestion and postnasal drainage that can worsen cough at bedtime.  - As long as you have never been told to take Tylenol and/or Ibuprofen you can use them to manage fever and body aches per package instructions  Make sure you eat when you take ibuprofen to avoid stomach upset.  - OTC cough medications per package instructions to help with cough. Check to see if the cough/cold medication already has acetaminophen (Tylenol) in it. If it does avoid taking additional Tylenol.  - If sudden onset of new fever, worsening symptoms return for further evaluation.  - OTC nasal steroid such as Flonase can help decrease sinus inflammation to help with congestion.  - Education provided on symptoms of post-viral bacterial infections including ear infection and pneumonia. This would require re-evaluation for treatment.

## 2019-11-01 NOTE — ED AVS SNAPSHOT
[facility]  [address] [address]  Phone:  [phone]                                    Patrizia Delcid   MRN: [id number]    Department:  [facility]   Date of Visit:  11/1/2019           After Visit Summary Signature Page    I have received my discharge instructions, and my questions have been answered. I have discussed any challenges I see with this plan with the nurse or doctor.    ..........................................................................................................................................  Patient/Patient Representative Signature      ..........................................................................................................................................  Patient Representative Print Name and Relationship to Patient    ..................................................               ................................................  Date                                   Time    ..........................................................................................................................................  Reviewed by Signature/Title    ...................................................              ..............................................  Date                                               Time          22EPIC Rev 08/18

## 2019-11-02 ENCOUNTER — HEALTH MAINTENANCE LETTER (OUTPATIENT)
Age: 59
End: 2019-11-02

## 2019-11-04 ASSESSMENT — ENCOUNTER SYMPTOMS
COUGH: 1
ARTHRALGIAS: 0
MYALGIAS: 0
CHILLS: 0
WOUND: 1
FEVER: 0

## 2020-03-25 DIAGNOSIS — B02.7 DISSEMINATED HERPES ZOSTER: ICD-10-CM

## 2020-03-25 RX ORDER — VALACYCLOVIR HYDROCHLORIDE 1 G/1
TABLET, FILM COATED ORAL
Qty: 21 TABLET | Refills: 11 | Status: SHIPPED | OUTPATIENT
Start: 2020-03-25 | End: 2021-06-18

## 2020-03-25 NOTE — TELEPHONE ENCOUNTER
LvalACYclovir (VALTREX) 1000 mg tablet   ast Written Prescription Date:  1/2/18  Last Fill Quantity: 21,   # refills: 1  Last Office Visit: 8/1/19  Future Office visit:       Routing refill request to provider for review/approval because:  Routing to determine if medication is approved for a refill due to the amount of time since the medication was last filled.

## 2020-05-15 ENCOUNTER — MYC MEDICAL ADVICE (OUTPATIENT)
Dept: FAMILY MEDICINE | Facility: OTHER | Age: 60
End: 2020-05-15

## 2020-07-29 ENCOUNTER — MYC MEDICAL ADVICE (OUTPATIENT)
Dept: FAMILY MEDICINE | Facility: OTHER | Age: 60
End: 2020-07-29

## 2020-07-29 DIAGNOSIS — M60.89 OTHER MYOSITIS OF MULTIPLE SITES: Primary | ICD-10-CM

## 2020-11-14 ENCOUNTER — HEALTH MAINTENANCE LETTER (OUTPATIENT)
Age: 60
End: 2020-11-14

## 2020-12-03 ENCOUNTER — ANCILLARY PROCEDURE (OUTPATIENT)
Dept: MAMMOGRAPHY | Facility: OTHER | Age: 60
End: 2020-12-03
Attending: PHYSICIAN ASSISTANT
Payer: COMMERCIAL

## 2020-12-03 DIAGNOSIS — Z12.31 VISIT FOR SCREENING MAMMOGRAM: ICD-10-CM

## 2020-12-03 PROCEDURE — 77067 SCR MAMMO BI INCL CAD: CPT | Mod: TC | Performed by: RADIOLOGY

## 2020-12-03 PROCEDURE — 77063 BREAST TOMOSYNTHESIS BI: CPT | Mod: TC | Performed by: RADIOLOGY

## 2020-12-07 DIAGNOSIS — M62.830 SPASM OF MUSCLE OF LOWER BACK: ICD-10-CM

## 2020-12-08 NOTE — TELEPHONE ENCOUNTER
cyclobenzaprine (FLEXERIL) 10 MG tablet      Last Written Prescription Date:  10/18/20  Last Fill Quantity: 30,   # refills: 1  Last Office Visit: 8/1/19  Future Office visit:       Routing refill request to provider for review/approval because:

## 2020-12-09 RX ORDER — CYCLOBENZAPRINE HCL 10 MG
TABLET ORAL
Qty: 30 TABLET | Refills: 0 | Status: SHIPPED | OUTPATIENT
Start: 2020-12-09 | End: 2021-06-18

## 2020-12-11 ENCOUNTER — TELEPHONE (OUTPATIENT)
Dept: FAMILY MEDICINE | Facility: OTHER | Age: 60
End: 2020-12-11

## 2020-12-11 NOTE — TELEPHONE ENCOUNTER
I called and spoke to pt with what PCP had suggested Pt to do. Per pt she is feeling better and will wait to go to [facility] for tx at this time. Pt aware of instructions from provider. Luna Otoole LPN

## 2020-12-11 NOTE — TELEPHONE ENCOUNTER
Pt called, reports suspected diverticulitis flare. Pt requesting abx. Reports R lower abdominal pain that started on Saturday. This pain is typical when pt has diverticulitis flare. Denies diarrhea, nausea, vomiting. Last flare was in 4/2019 and was treated with augmentin. Please advise. Thank you!

## 2021-01-07 ENCOUNTER — HOSPITAL ENCOUNTER (EMERGENCY)
Facility: HOSPITAL | Age: 61
Discharge: HOME | End: 2021-01-07
Attending: PHYSICIAN ASSISTANT | Admitting: PHYSICIAN ASSISTANT
Payer: COMMERCIAL

## 2021-01-07 ENCOUNTER — APPOINTMENT (OUTPATIENT)
Dept: CT IMAGING | Facility: HOSPITAL | Age: 61
End: 2021-01-07
Attending: PHYSICIAN ASSISTANT
Payer: COMMERCIAL

## 2021-01-07 ENCOUNTER — APPOINTMENT (OUTPATIENT)
Dept: GENERAL RADIOLOGY | Facility: HOSPITAL | Age: 61
End: 2021-01-07
Attending: PHYSICIAN ASSISTANT
Payer: COMMERCIAL

## 2021-01-07 VITALS
RESPIRATION RATE: 25 BRPM | TEMPERATURE: 98 F | HEART RATE: 78 BPM | SYSTOLIC BLOOD PRESSURE: 129 MMHG | OXYGEN SATURATION: 98 % | DIASTOLIC BLOOD PRESSURE: 91 MMHG

## 2021-01-07 DIAGNOSIS — R07.89 PRESSURE IN RIGHT SIDE OF CHEST: ICD-10-CM

## 2021-01-07 LAB
ALBUMIN SERPL-MCNC: 3.9 G/DL (ref 3.4–5)
ALP SERPL-CCNC: 91 U/L (ref 40–150)
ALT SERPL W P-5'-P-CCNC: 30 U/L (ref 0–50)
ANION GAP SERPL CALCULATED.3IONS-SCNC: 5 MMOL/L (ref 3–14)
AST SERPL W P-5'-P-CCNC: 21 U/L (ref 0–45)
BASOPHILS # BLD AUTO: 0 10E9/L (ref 0–0.2)
BASOPHILS NFR BLD AUTO: 0.5 %
BILIRUB SERPL-MCNC: 1.5 MG/DL (ref 0.2–1.3)
BUN SERPL-MCNC: 10 MG/DL (ref 7–30)
CALCIUM SERPL-MCNC: 9 MG/DL (ref 8.5–10.1)
CHLORIDE SERPL-SCNC: 104 MMOL/L (ref 94–109)
CO2 SERPL-SCNC: 29 MMOL/L (ref 20–32)
CREAT SERPL-MCNC: 0.64 MG/DL (ref 0.52–1.04)
D DIMER PPP FEU-MCNC: <0.3 UG/ML FEU (ref 0–0.5)
DIFFERENTIAL METHOD BLD: ABNORMAL
EOSINOPHIL # BLD AUTO: 0.1 10E9/L (ref 0–0.7)
EOSINOPHIL NFR BLD AUTO: 2.6 %
ERYTHROCYTE [DISTWIDTH] IN BLOOD BY AUTOMATED COUNT: 12.5 % (ref 10–15)
GFR SERPL CREATININE-BSD FRML MDRD: >90 ML/MIN/{1.73_M2}
GLUCOSE SERPL-MCNC: 93 MG/DL (ref 70–99)
HCT VFR BLD AUTO: 43.2 % (ref 35–47)
HGB BLD-MCNC: 14.6 G/DL (ref 11.7–15.7)
IMM GRANULOCYTES # BLD: 0 10E9/L (ref 0–0.4)
IMM GRANULOCYTES NFR BLD: 0.3 %
LYMPHOCYTES # BLD AUTO: 1.1 10E9/L (ref 0.8–5.3)
LYMPHOCYTES NFR BLD AUTO: 28 %
MCH RBC QN AUTO: 31.6 PG (ref 26.5–33)
MCHC RBC AUTO-ENTMCNC: 33.8 G/DL (ref 31.5–36.5)
MCV RBC AUTO: 94 FL (ref 78–100)
MONOCYTES # BLD AUTO: 0.3 10E9/L (ref 0–1.3)
MONOCYTES NFR BLD AUTO: 6.8 %
NEUTROPHILS # BLD AUTO: 2.4 10E9/L (ref 1.6–8.3)
NEUTROPHILS NFR BLD AUTO: 61.8 %
NRBC # BLD AUTO: 0 10*3/UL
NRBC BLD AUTO-RTO: 0 /100
PLATELET # BLD AUTO: 200 10E9/L (ref 150–450)
POTASSIUM SERPL-SCNC: 3.7 MMOL/L (ref 3.4–5.3)
PROT SERPL-MCNC: 7.2 G/DL (ref 6.8–8.8)
RBC # BLD AUTO: 4.62 10E12/L (ref 3.8–5.2)
SODIUM SERPL-SCNC: 138 MMOL/L (ref 133–144)
TROPONIN I SERPL-MCNC: <0.015 UG/L (ref 0–0.04)
TROPONIN I SERPL-MCNC: <0.015 UG/L (ref 0–0.04)
WBC # BLD AUTO: 3.8 10E9/L (ref 4–11)

## 2021-01-07 PROCEDURE — 80053 COMPREHEN METABOLIC PANEL: CPT | Performed by: PHYSICIAN ASSISTANT

## 2021-01-07 PROCEDURE — 36415 COLL VENOUS BLD VENIPUNCTURE: CPT | Performed by: PHYSICIAN ASSISTANT

## 2021-01-07 PROCEDURE — 99285 EMERGENCY DEPT VISIT HI MDM: CPT | Performed by: PHYSICIAN ASSISTANT

## 2021-01-07 PROCEDURE — 84484 ASSAY OF TROPONIN QUANT: CPT | Performed by: PHYSICIAN ASSISTANT

## 2021-01-07 PROCEDURE — 250N000013 HC RX MED GY IP 250 OP 250 PS 637

## 2021-01-07 PROCEDURE — 85025 COMPLETE CBC W/AUTO DIFF WBC: CPT | Performed by: PHYSICIAN ASSISTANT

## 2021-01-07 PROCEDURE — 71045 X-RAY EXAM CHEST 1 VIEW: CPT

## 2021-01-07 PROCEDURE — 75574 CT ANGIO HRT W/3D IMAGE: CPT

## 2021-01-07 PROCEDURE — 250N000011 HC RX IP 250 OP 636: Performed by: PHYSICIAN ASSISTANT

## 2021-01-07 PROCEDURE — 99285 EMERGENCY DEPT VISIT HI MDM: CPT | Mod: 25

## 2021-01-07 PROCEDURE — 85379 FIBRIN DEGRADATION QUANT: CPT | Performed by: PHYSICIAN ASSISTANT

## 2021-01-07 PROCEDURE — 250N000013 HC RX MED GY IP 250 OP 250 PS 637: Performed by: PHYSICIAN ASSISTANT

## 2021-01-07 PROCEDURE — 93005 ELECTROCARDIOGRAM TRACING: CPT | Mod: XU

## 2021-01-07 RX ORDER — NITROGLYCERIN 0.4 MG/1
TABLET SUBLINGUAL
Status: COMPLETED
Start: 2021-01-07 | End: 2021-01-07

## 2021-01-07 RX ORDER — ASPIRIN 81 MG/1
324 TABLET, CHEWABLE ORAL ONCE
Status: COMPLETED | OUTPATIENT
Start: 2021-01-07 | End: 2021-01-07

## 2021-01-07 RX ORDER — METOPROLOL TARTRATE 1 MG/ML
5 INJECTION, SOLUTION INTRAVENOUS ONCE
Status: DISCONTINUED | OUTPATIENT
Start: 2021-01-07 | End: 2021-01-07 | Stop reason: HOSPADM

## 2021-01-07 RX ORDER — NITROGLYCERIN 0.4 MG/1
0.4 TABLET SUBLINGUAL
Status: DISCONTINUED | OUTPATIENT
Start: 2021-01-07 | End: 2021-01-07 | Stop reason: HOSPADM

## 2021-01-07 RX ORDER — ASPIRIN 81 MG/1
81 TABLET, CHEWABLE ORAL PRN
COMMUNITY
End: 2022-02-25

## 2021-01-07 RX ORDER — IOPAMIDOL 755 MG/ML
100 INJECTION, SOLUTION INTRAVASCULAR ONCE
Status: COMPLETED | OUTPATIENT
Start: 2021-01-07 | End: 2021-01-07

## 2021-01-07 RX ADMIN — NITROGLYCERIN: 0.4 TABLET SUBLINGUAL at 14:54

## 2021-01-07 RX ADMIN — IOPAMIDOL 100 ML: 755 INJECTION, SOLUTION INTRAVENOUS at 15:04

## 2021-01-07 RX ADMIN — ASPIRIN 243 MG: 81 TABLET, CHEWABLE ORAL at 13:54

## 2021-01-07 ASSESSMENT — ENCOUNTER SYMPTOMS
ACTIVITY CHANGE: 0
BRUISES/BLEEDS EASILY: 0
SHORTNESS OF BREATH: 0
BACK PAIN: 0
NAUSEA: 0
NECK STIFFNESS: 0
FEVER: 0
DIZZINESS: 0
FATIGUE: 0
WEAKNESS: 0
APPETITE CHANGE: 0
ABDOMINAL PAIN: 0
VOMITING: 0
LIGHT-HEADEDNESS: 1
PALPITATIONS: 0
NECK PAIN: 0
CHEST TIGHTNESS: 0

## 2021-01-07 NOTE — ED AVS SNAPSHOT
[facility]  [address] [place] [address]  Phone: [phone]                                    Patrizia Delcid   MRN: [id number]    Department: [facility]   Date of Visit: 1/7/2021           After Visit Summary Signature Page    I have received my discharge instructions, and my questions have been answered. I have discussed any challenges I see with this plan with the nurse or doctor.    ..........................................................................................................................................  Patient/Patient Representative Signature      ..........................................................................................................................................  Patient Representative Print Name and Relationship to Patient    ..................................................               ................................................  Date                                   Time    ..........................................................................................................................................  Reviewed by Signature/Title    ...................................................              ..............................................  Date                                               Time          22EPIC Rev 08/18

## 2021-01-07 NOTE — PROGRESS NOTES
pt brought back to ER room 10.  Connected to the monitors.  Offers no complaints of chest pain.  Slight headache after nitorglycerin but gone now. Report to Collin ODRONEZ

## 2021-01-07 NOTE — ED PROVIDER NOTES
"  History     Chief Complaint   Patient presents with     Chest Pain     The history is provided by the patient.     Patrizia Delcid is a 60 year old female who presented to the emergency department ambulatory for evaluation of an approximate 1 day history of intermittent right mid chest \"pressure.\"  The patient tells me she has been experiencing some \"bands are on my arms and tingling in my legs\" over the last several months.  This is her first episode of the chest pressure.  Focal in nature.  Described as a pressure at this time and a 2 on the 10 scale.  Denies any dyspnea, diaphoresis, or vomiting.  Denies any radiation of the discomfort.  Denies any recent fevers or chills.  Denies any hemoptysis.  Denies any history of hypertension, diabetes, or previous coronary disease.  She does not smoke.    Allergies:  Allergies   Allergen Reactions     Codeine Nausea       Problem List:    Patient Active Problem List    Diagnosis Date Noted     Rheumatoid arthritis (H) 01/03/2019     Priority: Medium     Elevated rheumatoid factor 11/13/2017     Priority: Medium     Vitamin D deficiency disease 11/13/2017     Priority: Medium     Primary osteoarthritis of right shoulder 09/07/2016     Priority: Medium     ACP (advance care planning) 04/06/2016     Priority: Medium     Advance Care Planning 4/6/2016: ACP Review of Chart / Resources Provided:  Reviewed chart for advance care plan.  Patrizia Delcid has been provided information and resources to begin or update their advance care plan.  Added by Maritza Manuel                Past Medical History:    Past Medical History:   Diagnosis Date     Diverticulitis of colon (without mention of hemorrhage)(562.11) 12/08/2010     Endometriosis 09/12/2011     Fibrocystic change of Breast 09/12/2011     Gilbert Disease 09/12/2011     Herpes zoster without mention of complication 12/08/2010     Hormone replacement therapy (postmenopausal) 12/08/2010     Primary osteoarthritis of " right shoulder 9/7/2016     Symptomatic states associated with artificial menopause 12/08/2010       Past Surgical History:    Past Surgical History:   Procedure Laterality Date     BIOPSY      breast biopsy     CHOLECYSTECTOMY       COLONOSCOPY  02/17/2011     HYSTERECTOMY TOTAL ABDOMINAL, BILATERAL SALPINGO-OOPHORECTOMY, COMBINED N/A     vaginal vs abdominal hyst?     LEFT LUMPECTOMY      Benign       Family History:    Family History   Problem Relation Age of Onset     Myocardial Infarction Mother      C.A.D. Mother      Heart Disease Father      Cancer Father         LUNG     C.A.D. Father      Cancer Brother         LUNG     Myocardial Infarction Other         MYOCARDIAL INFARCTION     Cancer Brother         TESTICULAR     Breast Cancer Other      Myocardial Infarction Other         MYOCARDIAL INFARCTION       Social History:  Marital Status:   [5]  Social History     Tobacco Use     Smoking status: Former Smoker     Types: Cigarettes     Smokeless tobacco: Never Used     Tobacco comment: Tried to Quit (YES); YR QUIT (1980); Passive Exposure (NO)   Substance Use Topics     Alcohol use: Yes     Alcohol/week: 0.0 standard drinks     Comment: RARELY     Drug use: No        Medications:         Ascorbic Acid (MADIHA-C PO)       aspirin (ASA) 81 MG chewable tablet       cetirizine (ZYRTEC ALLERGY) 10 MG tablet       Probiotic Product (PROBIOTIC DAILY PO)       sennosides (SENOKOT) 8.6 MG tablet       vitamin D3 (CHOLECALCIFEROL) 2000 units tablet       cyclobenzaprine (FLEXERIL) 10 MG tablet       valACYclovir (VALTREX) 1000 mg tablet          Review of Systems   Constitutional: Negative for activity change, appetite change, fatigue and fever.   Respiratory: Negative for chest tightness and shortness of breath.    Cardiovascular: Positive for chest pain. Negative for palpitations.   Gastrointestinal: Negative for abdominal pain, nausea and vomiting.   Genitourinary: Negative.    Musculoskeletal: Negative for  back pain, neck pain and neck stiffness.   Skin: Negative.    Allergic/Immunologic: Negative for immunocompromised state.   Neurological: Positive for light-headedness. Negative for dizziness and weakness.        Single episode of lightheadedness that resolved   Hematological: Does not bruise/bleed easily.       Physical Exam   BP: 138/76  Pulse: 78  Temp: 98  F (36.7  C)  Resp: 16  SpO2: 100 %      Physical Exam  Vitals signs and nursing note reviewed.   Constitutional:       General: She is not in acute distress.     Appearance: Normal appearance. She is normal weight. She is not ill-appearing, toxic-appearing or diaphoretic.      Comments: Pleasant and talkative 60-year-old female found semireclined in the exam bed in no distress.   Eyes:      Extraocular Movements: Extraocular movements intact.      Conjunctiva/sclera: Conjunctivae normal.   Neck:      Musculoskeletal: Normal range of motion and neck supple. No neck rigidity.   Cardiovascular:      Rate and Rhythm: Normal rate.   Pulmonary:      Effort: Pulmonary effort is normal.   Abdominal:      General: There is no distension.      Palpations: Abdomen is soft.      Tenderness: There is no abdominal tenderness. There is no guarding.   Musculoskeletal:      Right lower leg: No edema.      Left lower leg: No edema.   Skin:     General: Skin is warm and dry.      Capillary Refill: Capillary refill takes less than 2 seconds.   Neurological:      General: No focal deficit present.      Mental Status: She is alert and oriented to person, place, and time.      Motor: No weakness.      Gait: Gait normal.   Psychiatric:         Mood and Affect: Mood normal.         ED Course     ED Course as of Jan 07 1634   Thu Jan 07, 2021   1409 We are unable to get a stress test for at least two weeks.  Moderate risk.  I believe that she would fit any reasonable indication for coronary CTA.         Procedures  EKG interpretation at 1305 shows a normal sinus rhythm at a rate of 66.   Normal PA interval.  Normal QRS duration.  Normal QTC.  Normal axis.  Normal P wave duration.  There are no concerning ST segments.  There are no concerning T waves.  There is no evidence of ectopy, preexcitation, or ischemia.        Chest x-ray shows no evidence of focal consolidation, pneumothorax, or widened mediastinum.    Critical Care time:  none               Results for orders placed or performed during the hospital encounter of 01/07/21 (from the past 24 hour(s))   CBC with platelets differential   Result Value Ref Range    WBC 3.8 (L) 4.0 - 11.0 10e9/L    RBC Count 4.62 3.8 - 5.2 10e12/L    Hemoglobin 14.6 11.7 - 15.7 g/dL    Hematocrit 43.2 35.0 - 47.0 %    MCV 94 78 - 100 fl    MCH 31.6 26.5 - 33.0 pg    MCHC 33.8 31.5 - 36.5 g/dL    RDW 12.5 10.0 - 15.0 %    Platelet Count 200 150 - 450 10e9/L    Diff Method Automated Method     % Neutrophils 61.8 %    % Lymphocytes 28.0 %    % Monocytes 6.8 %    % Eosinophils 2.6 %    % Basophils 0.5 %    % Immature Granulocytes 0.3 %    Nucleated RBCs 0 0 /100    Absolute Neutrophil 2.4 1.6 - 8.3 10e9/L    Absolute Lymphocytes 1.1 0.8 - 5.3 10e9/L    Absolute Monocytes 0.3 0.0 - 1.3 10e9/L    Absolute Eosinophils 0.1 0.0 - 0.7 10e9/L    Absolute Basophils 0.0 0.0 - 0.2 10e9/L    Abs Immature Granulocytes 0.0 0 - 0.4 10e9/L    Absolute Nucleated RBC 0.0    Comprehensive metabolic panel   Result Value Ref Range    Sodium 138 133 - 144 mmol/L    Potassium 3.7 3.4 - 5.3 mmol/L    Chloride 104 94 - 109 mmol/L    Carbon Dioxide 29 20 - 32 mmol/L    Anion Gap 5 3 - 14 mmol/L    Glucose 93 70 - 99 mg/dL    Urea Nitrogen 10 7 - 30 mg/dL    Creatinine 0.64 0.52 - 1.04 mg/dL    GFR Estimate >90 >60 mL/min/[1.73_m2]    GFR Estimate If Black >90 >60 mL/min/[1.73_m2]    Calcium 9.0 8.5 - 10.1 mg/dL    Bilirubin Total 1.5 (H) 0.2 - 1.3 mg/dL    Albumin 3.9 3.4 - 5.0 g/dL    Protein Total 7.2 6.8 - 8.8 g/dL    Alkaline Phosphatase 91 40 - 150 U/L    ALT 30 0 - 50 U/L    AST 21 0 -  45 U/L   Troponin I   Result Value Ref Range    Troponin I ES <0.015 0.000 - 0.045 ug/L   D dimer quantitative   Result Value Ref Range    D Dimer <0.3 0.0 - 0.50 ug/ml FEU   XR Chest Port 1 View    Narrative    PROCEDURE:  XR CHEST PORT 1 VW    HISTORY:  chest pain.     COMPARISON:  None.    FINDINGS:   The cardiac silhouette is normal in size. The pulmonary vasculature is  normal.  The lungs are clear. No pleural effusion or pneumothorax.      Impression    IMPRESSION:  No acute cardiopulmonary disease.      YA MICHEL MD   CTA Angiogram coronary artery    Narrative    PROCEDURE: CTA ANGIOGRAM CORONARY ARTERY 1/7/2021 4:03 PM    HISTORY: chest pain    COMPARISONS: None.    Meds/Dose Given: Isovue 370 100    TECHNIQUE: CT scan of the heart without contrast was performed and a  CT calcium score was obtained. Subsequently a CT coronary angiogram  was performed with three-dimensional MIPS reconstructions performed on  a separate workstation    FINDINGS: CT scan of the heart without contrast: CT calcium score was  0. The heart is normal in size. The hilar and mediastinal lymph nodes  are normal. The adjacent lungs are clear.    CT coronary angiogram was degraded particularly for the right coronary  artery by motion. The proximal right coronary artery is widely patent.  There is a segment of the mid right coronary artery which was  nondiagnostic. The distal right coronary artery and marginal branches  appear normal.    The left main coronary artery left anterior descending and diagonal  branches appear normal. The circumflex and marginal branches appear  normal. Normal aortic valve cusps are seen.    The heart is normal in size.    The upper portion of the liver and spleen appear normal.         Impression    IMPRESSION: The images were degraded by heart motion. No gross  coronary artery stenosis or occlusion is seen however study is  suboptimal    YA MICHEL MD       Medications   metoprolol (LOPRESSOR)  injection 5 mg (has no administration in time range)   aspirin (ASA) chewable tablet 324 mg (243 mg Oral Given 1/7/21 1354)   nitroGLYcerin (NITROSTAT) 0.4 MG sublingual tablet (  Given 1/7/21 1454)   iopamidol (ISOVUE-370) solution 100 mL (100 mLs Intravenous Given 1/7/21 1504)   sodium chloride (PF) 0.9% PF flush 130 mL (130 mLs Intravenous Given 1/7/21 1505)       Assessments & Plan (with Medical Decision Making)   Findings as above.  Pleasant and talkative 60-year-old female with approximately 24 hours of right-sided chest pressure.  Work-up has been unremarkable.  EKG is unremarkable.  Long detailed discussion with the patient.  With shared decision making we elected to obtain CT angiogram of the coronary arteries as we cannot get a stress test within the next several weeks.  This showed a calcium score of 0 but degraded angiogram.  Troponin negative x2.  I believe she can be safely discharged with close follow-up.  HPI, exam, symptoms, and work-up is not consistent with acute coronary syndrome, pulmonary embolism, pneumothorax, thoracic aortic dissection, pericarditis, or other intrathoracic catastrophe.  Symptoms entirely resolved emergency department without intervention.  She will return here for any worsening symptoms, new symptoms, or other concerns.    This document was prepared using a combination of typing and voice generated software.  While every attempt was made for accuracy, spelling and grammatical errors may exist.    I have reviewed the nursing notes.    I have reviewed the findings, diagnosis, plan and need for follow up with the patient.       New Prescriptions    No medications on file       Final diagnoses:   Pressure in right side of chest       1/7/2021   [facility]     Natanael Bustillos PA-C  01/07/21 [id number]

## 2021-01-07 NOTE — DISCHARGE INSTRUCTIONS
Your work-up today showed a coronary artery calcium score of 0.  Unfortunately, the angiogram portion was degraded by motion.  I am glad you are feeling better.  I believe he can be safely discharged with close follow-up in the clinic.  Continue a daily baby aspirin.  Please return here for any new symptoms, worsening symptoms, or other questions or concerns whatsoever.

## 2021-01-07 NOTE — ED TRIAGE NOTES
"Chest pain \"pressure\" off and on starting yesterday. Pt states today's episode started at 1000. experiencing \"tight band feeling\" in both upper arms.  "

## 2021-02-02 ENCOUNTER — TELEPHONE (OUTPATIENT)
Dept: FAMILY MEDICINE | Facility: OTHER | Age: 61
End: 2021-02-02

## 2021-02-02 NOTE — TELEPHONE ENCOUNTER
9:42 AM    Reason for Call: OVERBOOK    Needs a ER follow up appointment with AMANDO Bailon. Patient was seen in the ER on 1/7/2021 would like to see Toyin for this sooner than later. Patient forgot to call to make this appointment in January.     The patient is requesting an appointment for ER with Khushi.    Was an appointment offered for this call? Yes  If yes : Appointment type              Date Wednesday 2/17/2021 10:15 with Toyin Puri     Preferred method for responding to this message: Telephone Call  What is your phone number ? [phone]    If we cannot reach you directly, may we leave a detailed response at the number you provided? Yes    Can this message wait until your PCP/provider returns, if unavailable today? YES,     Alba Norton

## 2021-02-05 NOTE — PROGRESS NOTES
Subjective     Patrizia Delcid is a 60 year old female who presents to clinic today for the following health issues:    HPI         ED/UC Followup:    [place]  [facility] [place]  Date of visit: 1/7/2021  Reason for visit: Pressure on both arms; heaviness on chest  Current Status: feels fine today           Patient Active Problem List   Diagnosis     ACP (advance care planning)     Primary osteoarthritis of right shoulder     Elevated rheumatoid factor     Vitamin D deficiency disease     Rheumatoid arthritis (H)     Past Surgical History:   Procedure Laterality Date     BIOPSY      breast biopsy     CHOLECYSTECTOMY       COLONOSCOPY  02/17/2011     HYSTERECTOMY TOTAL ABDOMINAL, BILATERAL SALPINGO-OOPHORECTOMY, COMBINED N/A     vaginal vs abdominal hyst?     LEFT LUMPECTOMY      Benign       Social History     Tobacco Use     Smoking status: Former Smoker     Types: Cigarettes     Smokeless tobacco: Never Used     Tobacco comment: Tried to Quit (YES); YR QUIT (1980); Passive Exposure (NO)   Substance Use Topics     Alcohol use: Yes     Alcohol/week: 0.0 standard drinks     Comment: RARELY     Family History   Problem Relation Age of Onset     Myocardial Infarction Mother      C.A.D. Mother      Heart Disease Father      Cancer Father         LUNG     C.A.D. Father      Cancer Brother         LUNG     Myocardial Infarction Other         MYOCARDIAL INFARCTION     Cancer Brother         TESTICULAR     Breast Cancer Other      Myocardial Infarction Other         MYOCARDIAL INFARCTION         Current Outpatient Medications   Medication Sig Dispense Refill     Ascorbic Acid (MADIHA-C PO)        aspirin (ASA) 81 MG chewable tablet Take 81 mg by mouth as needed for moderate pain       cetirizine (ZYRTEC ALLERGY) 10 MG tablet Take 10 mg by mouth daily as needed.       cyclobenzaprine (FLEXERIL) 10 MG tablet TAKE 1/2 TO 1 TABLET BY MOUTH 3 TIMES A DAY AS NEEDED FOR MUSCLE SPASMS 30 tablet 0     Probiotic Product (PROBIOTIC DAILY PO)     "    sennosides (SENOKOT) 8.6 MG tablet Take 1 tablet by mouth daily       valACYclovir (VALTREX) 1000 mg tablet TAKE 1 TABLET BY MOUTH 3 TIMES DAILY PRN 21 tablet 11     vitamin D3 (CHOLECALCIFEROL) 2000 units tablet Take 1 tablet by mouth daily       Allergies   Allergen Reactions     Codeine Nausea     Recent Labs   Lab Test 01/07/21  1325 04/04/19  0931 02/17/18  1158 02/17/18  1158 10/27/16  1608 10/27/16  1608 11/04/13  0822 11/04/13  0822   LDL  --   --   --   --   --   --   --  150*   HDL  --   --   --   --   --   --   --  43   TRIG  --   --   --   --   --   --   --  154*   ALT 30  --   --  36  --  36   < > 36   CR 0.64 0.77   < > 0.77   < > 0.73   < > 0.94   GFRESTIMATED >90 84   < > 77   < > 82   < > 62   GFRESTBLACK >90 >90   < > >90   < > >90  African American GFR Calc     < > 75   POTASSIUM 3.7 3.7   < > 3.6   < > 4.0   < > 4.7   TSH  --   --   --   --   --  1.37  --  2.21    < > = values in this interval not displayed.      BP Readings from Last 3 Encounters:   02/08/21 128/74   01/07/21 129/91   11/01/19 131/84    Wt Readings from Last 3 Encounters:   02/08/21 62.6 kg (138 lb)   08/01/19 64.9 kg (143 lb)   04/04/19 64.9 kg (143 lb)                    Reviewed and updated as needed this visit by Provider                 Review of Systems   Constitutional, HEENT, cardiovascular, pulmonary, GI, , musculoskeletal, neuro, skin, endocrine and psych systems are negative, except as otherwise noted.      Objective    /74 (BP Location: Left arm, Patient Position: Sitting, Cuff Size: Adult Regular)   Pulse 74   Temp 97.9  F (36.6  C) (Tympanic)   Ht 1.6 m (5' 3\")   Wt 62.6 kg (138 lb)   SpO2 98%   BMI 24.45 kg/m    Body mass index is 24.45 kg/m .  Physical Exam   GENERAL: healthy, alert and no distress  EYES: Eyes grossly normal to inspection, PERRL and conjunctivae and sclerae normal  HENT: ear canals and TM's normal, nose and mouth without ulcers or lesions  NECK: no adenopathy, no asymmetry, " masses, or scars and thyroid normal to palpation  RESP: lungs clear to auscultation - no rales, rhonchi or wheezes  CV: regular rate and rhythm, normal S1 S2, no S3 or S4, no murmur, click or rub, no peripheral edema and peripheral pulses strong  ABDOMEN: soft, nontender, no hepatosplenomegaly, no masses and bowel sounds normal, chronic constipation and irritability of her bowel.   MS: right arm impingement.      Diagnostic Test Results:  Labs reviewed in Epic  No results found for this or any previous visit (from the past 24 hour(s)).        Assessment & Plan     Other myositis of multiple sites  She is not feeling any pain. Still think we have some Rheumatological going on. Checking labs. See us back as her WBC was lower normal. In past with this type of pain had lower platelets.   - CBC with platelets differential  - ESR: Erythrocyte sedimentation rate  - CRP, inflammation    IBD (inflammatory bowel disease)  Needing to see her GI specialist needing a colon screening as well but needing to discuss her current bowel issues.   - GASTROENTEROLOGY ADULT REF CONSULT ONLY; Future        See Patient Instructions    No follow-ups on file.    MYRA Tsang  [facility] - [facility]

## 2021-02-08 ENCOUNTER — OFFICE VISIT (OUTPATIENT)
Dept: FAMILY MEDICINE | Facility: OTHER | Age: 61
End: 2021-02-08
Attending: PHYSICIAN ASSISTANT
Payer: COMMERCIAL

## 2021-02-08 VITALS
WEIGHT: 138 LBS | BODY MASS INDEX: 24.45 KG/M2 | OXYGEN SATURATION: 98 % | HEIGHT: 63 IN | DIASTOLIC BLOOD PRESSURE: 74 MMHG | HEART RATE: 74 BPM | TEMPERATURE: 97.9 F | SYSTOLIC BLOOD PRESSURE: 128 MMHG

## 2021-02-08 DIAGNOSIS — K52.9 IBD (INFLAMMATORY BOWEL DISEASE): Primary | ICD-10-CM

## 2021-02-08 DIAGNOSIS — M60.89 OTHER MYOSITIS OF MULTIPLE SITES: ICD-10-CM

## 2021-02-08 LAB
BASOPHILS # BLD AUTO: 0 10E9/L (ref 0–0.2)
BASOPHILS NFR BLD AUTO: 0.5 %
CRP SERPL-MCNC: <2.9 MG/L (ref 0–8)
DIFFERENTIAL METHOD BLD: NORMAL
EOSINOPHIL # BLD AUTO: 0.1 10E9/L (ref 0–0.7)
EOSINOPHIL NFR BLD AUTO: 1.5 %
ERYTHROCYTE [DISTWIDTH] IN BLOOD BY AUTOMATED COUNT: 12.6 % (ref 10–15)
ERYTHROCYTE [SEDIMENTATION RATE] IN BLOOD BY WESTERGREN METHOD: 6 MM/H (ref 0–30)
HCT VFR BLD AUTO: 46.7 % (ref 35–47)
HGB BLD-MCNC: 15.6 G/DL (ref 11.7–15.7)
IMM GRANULOCYTES # BLD: 0 10E9/L (ref 0–0.4)
IMM GRANULOCYTES NFR BLD: 0.2 %
LYMPHOCYTES # BLD AUTO: 1.3 10E9/L (ref 0.8–5.3)
LYMPHOCYTES NFR BLD AUTO: 22.3 %
MCH RBC QN AUTO: 31.5 PG (ref 26.5–33)
MCHC RBC AUTO-ENTMCNC: 33.4 G/DL (ref 31.5–36.5)
MCV RBC AUTO: 94 FL (ref 78–100)
MONOCYTES # BLD AUTO: 0.4 10E9/L (ref 0–1.3)
MONOCYTES NFR BLD AUTO: 7.2 %
NEUTROPHILS # BLD AUTO: 4 10E9/L (ref 1.6–8.3)
NEUTROPHILS NFR BLD AUTO: 68.3 %
NRBC # BLD AUTO: 0 10*3/UL
NRBC BLD AUTO-RTO: 0 /100
PLATELET # BLD AUTO: 205 10E9/L (ref 150–450)
RBC # BLD AUTO: 4.95 10E12/L (ref 3.8–5.2)
WBC # BLD AUTO: 5.9 10E9/L (ref 4–11)

## 2021-02-08 PROCEDURE — 86140 C-REACTIVE PROTEIN: CPT | Performed by: PHYSICIAN ASSISTANT

## 2021-02-08 PROCEDURE — 85652 RBC SED RATE AUTOMATED: CPT | Performed by: PHYSICIAN ASSISTANT

## 2021-02-08 PROCEDURE — 85025 COMPLETE CBC W/AUTO DIFF WBC: CPT | Performed by: PHYSICIAN ASSISTANT

## 2021-02-08 PROCEDURE — 36415 COLL VENOUS BLD VENIPUNCTURE: CPT | Performed by: PHYSICIAN ASSISTANT

## 2021-02-08 PROCEDURE — 99214 OFFICE O/P EST MOD 30 MIN: CPT | Performed by: PHYSICIAN ASSISTANT

## 2021-02-08 ASSESSMENT — PATIENT HEALTH QUESTIONNAIRE - PHQ9: SUM OF ALL RESPONSES TO PHQ QUESTIONS 1-9: 0

## 2021-02-08 ASSESSMENT — PAIN SCALES - GENERAL: PAINLEVEL: NO PAIN (0)

## 2021-02-08 ASSESSMENT — ANXIETY QUESTIONNAIRES
6. BECOMING EASILY ANNOYED OR IRRITABLE: NOT AT ALL
1. FEELING NERVOUS, ANXIOUS, OR ON EDGE: NOT AT ALL
2. NOT BEING ABLE TO STOP OR CONTROL WORRYING: NOT AT ALL
7. FEELING AFRAID AS IF SOMETHING AWFUL MIGHT HAPPEN: NOT AT ALL
GAD7 TOTAL SCORE: 0
3. WORRYING TOO MUCH ABOUT DIFFERENT THINGS: NOT AT ALL
5. BEING SO RESTLESS THAT IT IS HARD TO SIT STILL: NOT AT ALL
4. TROUBLE RELAXING: NOT AT ALL

## 2021-02-08 ASSESSMENT — MIFFLIN-ST. JEOR: SCORE: 1160.09

## 2021-02-08 NOTE — NURSING NOTE
"Chief Complaint   Patient presents with     ER F/U       Initial /74 (BP Location: Left arm, Patient Position: Sitting, Cuff Size: Adult Regular)   Pulse 74   Temp 97.9  F (36.6  C) (Tympanic)   Ht 1.6 m (5' 3\")   Wt 62.6 kg (138 lb)   SpO2 98%   BMI 24.45 kg/m   Estimated body mass index is 24.45 kg/m  as calculated from the following:    Height as of this encounter: 1.6 m (5' 3\").    Weight as of this encounter: 62.6 kg (138 lb).  Medication Reconciliation: complete  Luna Otoole LPN  "

## 2021-02-09 ASSESSMENT — ANXIETY QUESTIONNAIRES: GAD7 TOTAL SCORE: 0

## 2021-02-20 ENCOUNTER — MYC MEDICAL ADVICE (OUTPATIENT)
Dept: FAMILY MEDICINE | Facility: OTHER | Age: 61
End: 2021-02-20

## 2021-02-20 DIAGNOSIS — E55.9 VITAMIN D DEFICIENCY: Primary | ICD-10-CM

## 2021-02-25 ENCOUNTER — TRANSFERRED RECORDS (OUTPATIENT)
Dept: HEALTH INFORMATION MANAGEMENT | Facility: CLINIC | Age: 61
End: 2021-02-25

## 2021-02-25 ENCOUNTER — MEDICAL CORRESPONDENCE (OUTPATIENT)
Dept: MRI IMAGING | Facility: HOSPITAL | Age: 61
End: 2021-02-25

## 2021-03-11 ENCOUNTER — TRANSFERRED RECORDS (OUTPATIENT)
Dept: HEALTH INFORMATION MANAGEMENT | Facility: CLINIC | Age: 61
End: 2021-03-11

## 2021-03-15 ENCOUNTER — HOSPITAL ENCOUNTER (OUTPATIENT)
Dept: MRI IMAGING | Facility: HOSPITAL | Age: 61
Discharge: HOME | End: 2021-03-15
Attending: ORTHOPAEDIC SURGERY | Admitting: ORTHOPAEDIC SURGERY
Payer: COMMERCIAL

## 2021-03-15 DIAGNOSIS — M25.511 RIGHT SHOULDER PAIN: ICD-10-CM

## 2021-03-15 PROCEDURE — 73221 MRI JOINT UPR EXTREM W/O DYE: CPT | Mod: RT

## 2021-03-17 ENCOUNTER — TRANSFERRED RECORDS (OUTPATIENT)
Dept: HEALTH INFORMATION MANAGEMENT | Facility: CLINIC | Age: 61
End: 2021-03-17

## 2021-03-22 ENCOUNTER — TRANSFERRED RECORDS (OUTPATIENT)
Dept: HEALTH INFORMATION MANAGEMENT | Facility: CLINIC | Age: 61
End: 2021-03-22

## 2021-04-06 ENCOUNTER — TRANSFERRED RECORDS (OUTPATIENT)
Dept: HEALTH INFORMATION MANAGEMENT | Facility: CLINIC | Age: 61
End: 2021-04-06

## 2021-05-22 ENCOUNTER — HOSPITAL ENCOUNTER (EMERGENCY)
Facility: HOSPITAL | Age: 61
Discharge: HOME | End: 2021-05-22
Attending: NURSE PRACTITIONER | Admitting: NURSE PRACTITIONER
Payer: COMMERCIAL

## 2021-05-22 VITALS
TEMPERATURE: 97.5 F | SYSTOLIC BLOOD PRESSURE: 119 MMHG | RESPIRATION RATE: 18 BRPM | OXYGEN SATURATION: 94 % | DIASTOLIC BLOOD PRESSURE: 71 MMHG
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
## 2021-05-22 DIAGNOSIS — N32.81 OVERACTIVE BLADDER: ICD-10-CM

## 2021-05-22 LAB
ALBUMIN UR-MCNC: NEGATIVE MG/DL
APPEARANCE UR: CLEAR
BILIRUB UR QL STRIP: NEGATIVE
COLOR UR AUTO: NORMAL
GLUCOSE UR STRIP-MCNC: NEGATIVE MG/DL
HGB UR QL STRIP: NEGATIVE
KETONES UR STRIP-MCNC: NEGATIVE MG/DL
LEUKOCYTE ESTERASE UR QL STRIP: NEGATIVE
NITRATE UR QL: NEGATIVE
PH UR STRIP: 7.5 PH (ref 4.7–8)
SOURCE: NORMAL
SP GR UR STRIP: 1 (ref 1–1.03)
UROBILINOGEN UR STRIP-MCNC: NORMAL MG/DL (ref 0–2)

## 2021-05-22 PROCEDURE — 99213 OFFICE O/P EST LOW 20 MIN: CPT | Performed by: NURSE PRACTITIONER

## 2021-05-22 PROCEDURE — 81003 URINALYSIS AUTO W/O SCOPE: CPT | Performed by: NURSE PRACTITIONER

## 2021-05-22 PROCEDURE — G0463 HOSPITAL OUTPT CLINIC VISIT: HCPCS

## 2021-05-22 RX ORDER — DOXYCYCLINE 100 MG/1
200 CAPSULE ORAL ONCE
Qty: 2 CAPSULE | Refills: 0 | Status: SHIPPED | OUTPATIENT
Start: 2021-05-22 | End: 2021-05-22

## 2021-05-22 ASSESSMENT — ENCOUNTER SYMPTOMS
VOMITING: 0
SHORTNESS OF BREATH: 0
LIGHT-HEADEDNESS: 0
FREQUENCY: 1
COLOR CHANGE: 1
DIZZINESS: 0
DYSURIA: 0
FEVER: 0
HEADACHES: 0
BACK PAIN: 0
DIARRHEA: 0
NAUSEA: 0
CHILLS: 0
HEMATURIA: 0
ARTHRALGIAS: 1
FLANK PAIN: 0

## 2021-05-22 NOTE — DISCHARGE INSTRUCTIONS
Monitor for signs of rash, fevers, headaches, muscles or joint aches and pains, sweats, chills, or flu symptoms. Be reevaluated by your primary care provider if any of these symptoms develop.     Avoid grassy  and wooded. areas, use ticks repellant's. Have animals treated for ticks    Benadryl for itching.  Ibuprofen and/or acetaminophen for discomfort.  Do not take milk products two hours before or after taking doxycycline.  Do not take multivitamins today or tomorrow after taking antibiotic    Follow-up with primary care provider if urinary symptoms continue or worsen

## 2021-05-22 NOTE — ED TRIAGE NOTES
Pt presents with a reddened area to her chest that she noticed a couple of days ago. She also has had urinary frequency and urgency for 3-4 days.

## 2021-05-22 NOTE — ED PROVIDER NOTES
"  History     Chief Complaint   Patient presents with     Skin Check     pt concerned about an area on chest that appears to look like a \"bullseye.\"      Rule out Urinary Tract Infection     urinary urgency      HPI  Patrizia Delcid is a 61 year old female who presents with symptoms of urinary tract infection.  She has had symptoms are frequency and urgency for the past 4 days.  Has not had a urinary tract infection for quite a few years.  Is sexually active.  Has taken Tylenol with mild relief of her symptoms.   She also noticed she has what appears to be a tick bite in the upper central part of her chest.  She is not sure if this is a tick bite but it appears to be a small bull's-eye.    Denies fevers, chills, nausea, vomiting, diarrhea, shortness of breath, and muscle aches.    URINARY TRACT SYMPTOMS      Duration: four days    Description  frequency and urgency    Intensity:  moderate    Accompanying signs and symptoms:  Fever/chills: no   Flank pain no   Nausea and vomiting: no   Vaginal symptoms: none  Abdominal/Pelvic Pain: no     History  History of frequent UTI's: YES- not for years  History of kidney stones: no   Sexually Active: YES  Possibility of pregnancy: No    Precipitating or alleviating factors: None    Therapies tried and outcome: Tylenol   Outcome: takes edge off.     Allergies:  Allergies   Allergen Reactions     Codeine Nausea       Problem List:    Patient Active Problem List    Diagnosis Date Noted     Rheumatoid arthritis (H) 01/03/2019     Priority: Medium     Elevated rheumatoid factor 11/13/2017     Priority: Medium     Vitamin D deficiency disease 11/13/2017     Priority: Medium     Primary osteoarthritis of right shoulder 09/07/2016     Priority: Medium     ACP (advance care planning) 04/06/2016     Priority: Medium     Advance Care Planning 4/6/2016: ACP Review of Chart / Resources Provided:  Reviewed chart for advance care plan.  Patrizia Delcid has been provided information and " resources to begin or update their advance care plan.  Added by Maritza Manuel                Past Medical History:    Past Medical History:   Diagnosis Date     Diverticulitis of colon (without mention of hemorrhage)(562.11) 12/08/2010     Endometriosis 09/12/2011     Fibrocystic change of Breast 09/12/2011     Gilbert Disease 09/12/2011     Herpes zoster without mention of complication 12/08/2010     Hormone replacement therapy (postmenopausal) 12/08/2010     Primary osteoarthritis of right shoulder 9/7/2016     Symptomatic states associated with artificial menopause 12/08/2010       Past Surgical History:    Past Surgical History:   Procedure Laterality Date     BIOPSY      breast biopsy     CHOLECYSTECTOMY       COLONOSCOPY  02/17/2011     HYSTERECTOMY TOTAL ABDOMINAL, BILATERAL SALPINGO-OOPHORECTOMY, COMBINED N/A     vaginal vs abdominal hyst?     LEFT LUMPECTOMY      Benign       Family History:    Family History   Problem Relation Age of Onset     Myocardial Infarction Mother      C.A.D. Mother      Heart Disease Father      Cancer Father         LUNG     C.A.D. Father      Cancer Brother         LUNG     Myocardial Infarction Other         MYOCARDIAL INFARCTION     Cancer Brother         TESTICULAR     Breast Cancer Other      Myocardial Infarction Other         MYOCARDIAL INFARCTION       Social History:  Marital Status:   [5]  Social History     Tobacco Use     Smoking status: Former Smoker     Types: Cigarettes     Smokeless tobacco: Never Used     Tobacco comment: Tried to Quit (YES); YR QUIT (1980); Passive Exposure (NO)   Substance Use Topics     Alcohol use: Yes     Alcohol/week: 0.0 standard drinks     Comment: RARELY     Drug use: No        Medications:    Ascorbic Acid (MADIHA-C PO)  cetirizine (ZYRTEC ALLERGY) 10 MG tablet  doxycycline hyclate (VIBRAMYCIN) 100 MG capsule  Probiotic Product (PROBIOTIC DAILY PO)  sennosides (SENOKOT) 8.6 MG tablet  vitamin D3 (CHOLECALCIFEROL) 2000 units  tablet  aspirin (ASA) 81 MG chewable tablet  cyclobenzaprine (FLEXERIL) 10 MG tablet  valACYclovir (VALTREX) 1000 mg tablet          Review of Systems   Constitutional: Positive for activity change. Negative for chills and fever.   Respiratory: Negative for shortness of breath.    Gastrointestinal: Negative for abdominal pain, diarrhea, nausea and vomiting.   Genitourinary: Positive for frequency and urgency. Negative for dysuria, flank pain, hematuria, pelvic pain, vaginal bleeding, vaginal discharge and vaginal pain.   Musculoskeletal: Positive for arthralgias. Negative for back pain.   Skin: Positive for color change.        5 mm red/brown spot on mid chest   Neurological: Negative for dizziness, light-headedness and headaches (takes zyrtec for seasonal allergies).       Physical Exam   BP: 119/71  Temp: 97.5  F (36.4  C)  Resp: 18  SpO2: 94 %      Physical Exam  Vitals signs and nursing note reviewed.   Constitutional:       General: She is in acute distress (Mild).   Cardiovascular:      Rate and Rhythm: Normal rate and regular rhythm.      Heart sounds: Normal heart sounds. No murmur.   Pulmonary:      Effort: Pulmonary effort is normal. No respiratory distress.      Breath sounds: Normal breath sounds. No wheezing.   Chest:       Abdominal:      General: Abdomen is flat. Bowel sounds are normal. There is no distension.      Palpations: Abdomen is soft. There is no hepatomegaly or splenomegaly.      Tenderness: There is abdominal tenderness in the suprapubic area. There is no right CVA tenderness or left CVA tenderness. Negative signs include Rovsing's sign.   Skin:     General: Skin is warm and dry.      Findings: Erythema and rash present.   Neurological:      Mental Status: She is alert and oriented to person, place, and time.   Psychiatric:         Behavior: Behavior normal.         ED Course        Procedures             Results for orders placed or performed during the hospital encounter of 05/22/21  (from the past 24 hour(s))   UA reflex to Microscopic and Culture    Specimen: Midstream Urine   Result Value Ref Range    Color Urine Straw     Appearance Urine Clear     Glucose Urine Negative NEG^Negative mg/dL    Bilirubin Urine Negative NEG^Negative    Ketones Urine Negative NEG^Negative mg/dL    Specific Gravity Urine 1.003 1.003 - 1.035    Blood Urine Negative NEG^Negative    pH Urine 7.5 4.7 - 8.0 pH    Protein Albumin Urine Negative NEG^Negative mg/dL    Urobilinogen mg/dL Normal 0.0 - 2.0 mg/dL    Nitrite Urine Negative NEG^Negative    Leukocyte Esterase Urine Negative NEG^Negative    Source Midstream Urine        Medications - No data to display    Assessments & Plan (with Medical Decision Making)     I have reviewed the nursing notes.    I have reviewed the findings, diagnosis, plan and need for follow up with the patient.  (W57.XXXA) Tick bite, initial encounter  Comment: Presents with what appears to be a tick bite in the upper central region of her chest.  It does look like a small bull's-eye tick bite.   0.5 cm in diameter. No actual tick was removed.  Plan: Treated with doxycycline 200 mg one-time dose.  Education provided for this medication.    (N32.81) Overactive bladder  Comment: Urinary frequency and urgency for the past 3 to 4 days.  Urinalysis is negative of urinary tract infection.    MDM: NHT. Lungs CTA  Abdominal assessment negative except for mild tenderness over the mons pubis.    Plan: Instructed to follow-up with her primary care provider if urinary symptoms continue.  These discharge instructions and medications were reviewed with her and understanding verbalized.    This document was prepared using a combination of typing and voice generated software.  While every attempt was made for accuracy, spelling and grammatical errors may exist.    Discharge Medication List as of 5/22/2021 11:38 AM      START taking these medications    Details   doxycycline hyclate (VIBRAMYCIN) 100 MG  capsule Take 2 capsules (200 mg) by mouth once for 1 dose, Disp-2 capsule, R-0, E-Prescribe             Final diagnoses:   Tick bite, initial encounter   Overactive bladder       5/22/2021   [facility]       Leeann Whitman CNP  05/25/21 1620       Leeann Whitman, CNP  05/25/21 1635       Leeann Whitman, CNP  05/25/21 1638

## 2021-05-25 ASSESSMENT — ENCOUNTER SYMPTOMS
ABDOMINAL PAIN: 0
ACTIVITY CHANGE: 1

## 2021-06-07 DIAGNOSIS — M79.671 RIGHT FOOT PAIN: Primary | ICD-10-CM

## 2021-06-10 ENCOUNTER — OFFICE VISIT (OUTPATIENT)
Dept: ORTHOPEDICS | Facility: OTHER | Age: 61
End: 2021-06-10
Attending: PODIATRIST
Payer: COMMERCIAL

## 2021-06-10 ENCOUNTER — HOSPITAL ENCOUNTER (OUTPATIENT)
Dept: GENERAL RADIOLOGY | Facility: OTHER | Age: 61
End: 2021-06-10
Attending: PODIATRIST
Payer: COMMERCIAL

## 2021-06-10 VITALS — HEART RATE: 60 BPM | DIASTOLIC BLOOD PRESSURE: 60 MMHG | SYSTOLIC BLOOD PRESSURE: 104 MMHG

## 2021-06-10 DIAGNOSIS — M72.2 PLANTAR FASCIITIS: Primary | ICD-10-CM

## 2021-06-10 DIAGNOSIS — M79.671 RIGHT FOOT PAIN: ICD-10-CM

## 2021-06-10 PROCEDURE — 73630 X-RAY EXAM OF FOOT: CPT | Mod: RT

## 2021-06-10 PROCEDURE — 99203 OFFICE O/P NEW LOW 30 MIN: CPT | Performed by: PODIATRIST

## 2021-06-10 NOTE — PROGRESS NOTES
Patient is here for consult on her right foot pain.  Chastity Roman LPN .....................6/10/2021 11:09 AM

## 2021-06-10 NOTE — PROGRESS NOTES
Visit Date: 06/10/2021    HISTORY OF PRESENT ILLNESS:  Patrizia is a new patient to see me regarding right heel pain post-static in nature, ongoing for at least a few months, has tried some different shoes continues to have pain and has done some reading think she will have some orthotics.  We discussed this.  Here for evaluation and discussion of options.     HISTORY REVIEW:  I have reviewed this patient's past medical history, family history, social history as well as medications and allergies.  Any changes/additions were appropriately charted in the patient's electronic medical record.      PHYSICAL EXAMINATION:   CONSTITUTIONAL:  The patient is alert and oriented x3, well appearing and in no apparent distress.  Affect is pleasant and answers questions appropriately.  VASCULAR:  Circulation is intact with palpable pedal pulses and adequate capillary fill time to all digits.  Hair growth is present and appropriate to mid foot and digits.  NEUROLOGIC:  Light touch sensation is intact to digits.  There is a negative Tinel sign.  There are no signs of apparent nerve entrapment of superficial peroneal or common peroneal nerves.  INTEGUMENT:  No abnormal dermatologic lesions are noted.  Skin has normal texture and turgor.    MUSCULOSKELETAL:  Equinus is appreciated with the knee extended, improved with knee flexed very tight plantar fascial band tender to palpate plantar medial instep of heel.  A rectus foot otherwise.    IMAGING:  Three views, right foot taken demonstrate no obvious acute osseous pathology fairly rectus foot type, mild bunion is appreciated.  No obvious spurring.    ASSESSMENT:  Right foot acute plantar fasciitis, acute on chronic plantar fasciitis.  Plan discussed condition and treatment.  The patient today, I did offer a cortisone injection, which she declined, which is certainly okay.  We discussed conservative cares, orthotics over-the-counter.  Recommendations were made in terms orthotics and  shoes.  I gave her stretching routine.  I would like her to do multiple times per day without improvement.  Reappoint consider an injection at that time.    Twenty minute consultation.    Ricardo Avila DPM        D: 06/10/2021   T: 06/10/2021   MT: marie    Name:     MURRAY AYALA  MRN:      [id number]        Account:    [id number]   :      1960           Visit Date: 06/10/2021     Document: [id number]

## 2021-06-18 DIAGNOSIS — B02.7 DISSEMINATED HERPES ZOSTER: ICD-10-CM

## 2021-06-18 DIAGNOSIS — M62.830 SPASM OF MUSCLE OF LOWER BACK: ICD-10-CM

## 2021-06-18 RX ORDER — VALACYCLOVIR HYDROCHLORIDE 1 G/1
TABLET, FILM COATED ORAL
Qty: 21 TABLET | Refills: 1 | Status: SHIPPED | OUTPATIENT
Start: 2021-06-18 | End: 2023-05-02

## 2021-06-18 RX ORDER — CYCLOBENZAPRINE HCL 10 MG
TABLET ORAL
Qty: 30 TABLET | Refills: 0 | Status: SHIPPED | OUTPATIENT
Start: 2021-06-18 | End: 2022-02-25

## 2021-06-18 NOTE — TELEPHONE ENCOUNTER
Not on protocol    cyclobenzaprine (FLEXERIL) 10 MG tablet      Last Written Prescription Date:  12/9/20  Last Fill Quantity: 30,   # refills: 0  Last Office Visit: 2/8/21  Future Office visit:       Routing refill request to provider for review/approval because:  Drug not on the [facility], [facility] or [facility] refill protocol or controlled substance

## 2021-09-12 ENCOUNTER — HEALTH MAINTENANCE LETTER (OUTPATIENT)
Age: 61
End: 2021-09-12

## 2022-01-02 ENCOUNTER — HEALTH MAINTENANCE LETTER (OUTPATIENT)
Age: 62
End: 2022-01-02

## 2022-02-24 NOTE — PROGRESS NOTES
"  Assessment & Plan     Spasm of muscle of lower back  Has had massage and yoga. Still having pain in groin.   - cyclobenzaprine (FLEXERIL) 10 MG tablet; TAKE 1/2 TO 1 TABLET BY MOUTH 3 TIMES A DAY AS NEEDED FOR MUSCLE SPASMS  - XR Hip Left 2-3 Views (Clinic Performed); Future  - XR LUMBAR SPINE 2/3 VIEWS (Clinic Performed); Future    Plantar fasciitis  Needing Injection.   - Orthopedic  Referral; Future    Bunion, left  Wants to discuss.   - Orthopedic  Referral; Future    Review of external notes as documented elsewhere in note  Ordering of each unique test  Prescription drug management  5 minutes spent on the date of the encounter doing review of outside records, patient visit and documentation            No follow-ups on file.    MYRA Tsang  [facility] - NEFTALI Acharya is a 61 year old who presents for the following health issues     HPI     Concern - Left hip   Onset: started about the beginning of the month    Description: noticed it after she came back from [place], awful hip pain, its better today;has a bunion on left toe she is thinking this maybe causing the pain  Intensity: moderate  Progression of Symptoms:  improving  Accompanying Signs & Symptoms: left hip pain  Previous history of similar problem: none  Precipitating factors:        Worsened by: none  Alleviating factors:        Improved by: none  Therapies tried and outcome: has been using Flexeril,ibuprofen and tylenol as needed.         Review of Systems   CONSTITUTIONAL:NEGATIVE for fever, chills, change in weight  MUSCULOSKELETAL: significant pain left hip, also has a large bunion on her left foot.   NEURO: numbness in toes.   PSYCHIATRIC: NEGATIVE for changes in mood or affect, HX depression and fatigue      Objective    /62 (BP Location: Left arm, Patient Position: Sitting)   Pulse 87   Temp 98.1  F (36.7  C) (Tympanic)   Ht 1.6 m (5' 3\")   Wt 64 kg (141 lb)   SpO2 100%   " BMI 24.98 kg/m    Body mass index is 24.98 kg/m .  Physical Exam   GENERAL: healthy, alert and no distress  NECK: no adenopathy, no asymmetry, masses, or scars and thyroid normal to palpation  RESP: lungs clear to auscultation - no rales, rhonchi or wheezes  CV: regular rate and rhythm, normal S1 S2, no S3 or S4, no murmur, click or rub, no peripheral edema and peripheral pulses strong  MS: pain is present in the lateral  buttox.  Large bunion on her left foot.     Admission on 05/22/2021, Discharged on 05/22/2021   Component Date Value Ref Range Status     Color Urine 05/22/2021 Straw   Final     Appearance Urine 05/22/2021 Clear   Final     Glucose Urine 05/22/2021 Negative  NEG^Negative mg/dL Final     Bilirubin Urine 05/22/2021 Negative  NEG^Negative Final     Ketones Urine 05/22/2021 Negative  NEG^Negative mg/dL Final     Specific Gravity Urine 05/22/2021 1.003  1.003 - 1.035 Final     Blood Urine 05/22/2021 Negative  NEG^Negative Final     pH Urine 05/22/2021 7.5  4.7 - 8.0 pH Final     Protein Albumin Urine 05/22/2021 Negative  NEG^Negative mg/dL Final     Urobilinogen mg/dL 05/22/2021 Normal  0.0 - 2.0 mg/dL Final     Nitrite Urine 05/22/2021 Negative  NEG^Negative Final     Leukocyte Esterase Urine 05/22/2021 Negative  NEG^Negative Final     Source 05/22/2021 Midstream Urine   Final

## 2022-02-25 ENCOUNTER — ANCILLARY PROCEDURE (OUTPATIENT)
Dept: GENERAL RADIOLOGY | Facility: OTHER | Age: 62
End: 2022-02-25
Attending: PHYSICIAN ASSISTANT
Payer: COMMERCIAL

## 2022-02-25 ENCOUNTER — OFFICE VISIT (OUTPATIENT)
Dept: FAMILY MEDICINE | Facility: OTHER | Age: 62
End: 2022-02-25
Attending: PHYSICIAN ASSISTANT
Payer: COMMERCIAL

## 2022-02-25 VITALS
OXYGEN SATURATION: 100 % | BODY MASS INDEX: 24.98 KG/M2 | WEIGHT: 141 LBS | HEART RATE: 87 BPM | DIASTOLIC BLOOD PRESSURE: 62 MMHG | HEIGHT: 63 IN | TEMPERATURE: 98.1 F | SYSTOLIC BLOOD PRESSURE: 110 MMHG

## 2022-02-25 DIAGNOSIS — M62.830 SPASM OF MUSCLE OF LOWER BACK: ICD-10-CM

## 2022-02-25 DIAGNOSIS — M72.2 PLANTAR FASCIITIS: Primary | ICD-10-CM

## 2022-02-25 DIAGNOSIS — M21.612 BUNION, LEFT: ICD-10-CM

## 2022-02-25 PROCEDURE — 73502 X-RAY EXAM HIP UNI 2-3 VIEWS: CPT | Mod: TC | Performed by: RADIOLOGY

## 2022-02-25 PROCEDURE — 99214 OFFICE O/P EST MOD 30 MIN: CPT | Performed by: PHYSICIAN ASSISTANT

## 2022-02-25 PROCEDURE — 72100 X-RAY EXAM L-S SPINE 2/3 VWS: CPT | Mod: TC | Performed by: RADIOLOGY

## 2022-02-25 RX ORDER — CYCLOBENZAPRINE HCL 10 MG
TABLET ORAL
Qty: 30 TABLET | Refills: 0 | Status: SHIPPED | OUTPATIENT
Start: 2022-02-25

## 2022-02-25 ASSESSMENT — ANXIETY QUESTIONNAIRES
7. FEELING AFRAID AS IF SOMETHING AWFUL MIGHT HAPPEN: NOT AT ALL
GAD7 TOTAL SCORE: 0
2. NOT BEING ABLE TO STOP OR CONTROL WORRYING: NOT AT ALL
6. BECOMING EASILY ANNOYED OR IRRITABLE: NOT AT ALL
4. TROUBLE RELAXING: NOT AT ALL
1. FEELING NERVOUS, ANXIOUS, OR ON EDGE: NOT AT ALL
5. BEING SO RESTLESS THAT IT IS HARD TO SIT STILL: NOT AT ALL
3. WORRYING TOO MUCH ABOUT DIFFERENT THINGS: NOT AT ALL

## 2022-02-25 ASSESSMENT — PATIENT HEALTH QUESTIONNAIRE - PHQ9: SUM OF ALL RESPONSES TO PHQ QUESTIONS 1-9: 0

## 2022-02-25 ASSESSMENT — PAIN SCALES - GENERAL: PAINLEVEL: MILD PAIN (2)

## 2022-02-25 NOTE — NURSING NOTE
"Chief Complaint   Patient presents with     Hip left       Initial /62 (BP Location: Left arm, Patient Position: Sitting)   Pulse 87   Temp 98.1  F (36.7  C) (Tympanic)   Ht 1.6 m (5' 3\")   Wt 64 kg (141 lb)   SpO2 100%   BMI 24.98 kg/m   Estimated body mass index is 24.98 kg/m  as calculated from the following:    Height as of this encounter: 1.6 m (5' 3\").    Weight as of this encounter: 64 kg (141 lb).  Medication Reconciliation: complete  Luna Otoole LPN    "

## 2022-02-26 ASSESSMENT — ANXIETY QUESTIONNAIRES: GAD7 TOTAL SCORE: 0

## 2022-02-28 ENCOUNTER — OFFICE VISIT (OUTPATIENT)
Dept: PODIATRY | Facility: OTHER | Age: 62
End: 2022-02-28
Attending: PHYSICIAN ASSISTANT
Payer: COMMERCIAL

## 2022-02-28 VITALS
HEIGHT: 63 IN | HEART RATE: 81 BPM | BODY MASS INDEX: 24.8 KG/M2 | OXYGEN SATURATION: 98 % | SYSTOLIC BLOOD PRESSURE: 120 MMHG | DIASTOLIC BLOOD PRESSURE: 70 MMHG | WEIGHT: 140 LBS | TEMPERATURE: 97.6 F

## 2022-02-28 DIAGNOSIS — M05.771 RHEUMATOID ARTHRITIS INVOLVING BOTH FEET WITH POSITIVE RHEUMATOID FACTOR (H): ICD-10-CM

## 2022-02-28 DIAGNOSIS — M05.772 RHEUMATOID ARTHRITIS INVOLVING BOTH FEET WITH POSITIVE RHEUMATOID FACTOR (H): ICD-10-CM

## 2022-02-28 DIAGNOSIS — M79.672 LEFT FOOT PAIN: ICD-10-CM

## 2022-02-28 DIAGNOSIS — M20.12 HALLUX VALGUS (ACQUIRED), LEFT FOOT: ICD-10-CM

## 2022-02-28 DIAGNOSIS — M77.52 BURSITIS OF LEFT FOOT: Primary | ICD-10-CM

## 2022-02-28 PROCEDURE — 99203 OFFICE O/P NEW LOW 30 MIN: CPT | Performed by: PODIATRIST

## 2022-02-28 ASSESSMENT — PAIN SCALES - GENERAL: PAINLEVEL: MILD PAIN (2)

## 2022-02-28 NOTE — PATIENT INSTRUCTIONS
Consider calling [facility] to schedule an appointment with the orthotist, Shamir Givens. He schedules appointments on Thursdays. He is located at the [facility] in [place] on Thursdays and [place] on Wednesdays.  ---Let Shamir know you have fat pad atrophy on th bilateral plantar heel and plantar forefoot. You also have a hallux rigidus secondary to the hallux valgus on the LEFT foot. Putting a firm pad under the LEFT 1st MTPJ reduces pain. Please consider adding a gomez's extension modification in the LEFT insert as well as moderate cushioning for the bilateral heel. Thank you     ---------------------------------------------------------------------    -Stability Shoe Gear: This involves wearing a solid tennis shoe that bends at the toe, but has a solid midfoot portion of the shoe that does not bend or twist in half, and a rigid heel contour.   -----Parish, Asics, and New Balance are a few brands that have several types of stability tennis shoes. However, these brands also carry lightweight shoes that do not meet the above criteria, so look for a stability tennis shoe.  -----Parish tend to have a wider toe box if you have difficulty finding wide enough shoes for your feet.   -----Any brand of can be worn as long as it meets the above three criteria.  -Stretching: Stretch the calf muscles to increase flexibility of the calf muscles. If possible, aim to stretch the calf muscles for a combined total of one hour per day.  -Icing: Ice the painful area of the foot minimally once a day for ten minutes per foot (can be a frozen water bottle if pain is on the bottom surface of the foot). Ice after any extended amount of time on your feet.  -Consider supportive sandals for around the house (such as Zheng, Vionic, Pacheco, Birkenstock, or Oofos sandals)

## 2022-02-28 NOTE — LETTER
"    2/28/2022         RE: Patrizia Delcid  [address] [place] [address]        Dear Colleague,    Thank you for referring your patient, Patrizia Delcid, to the [facility]. Please see a copy of my visit note below.    Chief complaint: Patient presents with:  Musculoskeletal Problem: Planter Fascitis and bunion of left foot      History of Present Illness: This 61 year old female with seropositive RA (confirmed on 11/13/2017) is seen at the request of MYRA Bailon, for evaluation and suggestions of management of LEFT heel pain.     She saw Dr. Avila in June of 2021 and he offered OTC orthotics or an injection. She tried OTC inserts and it helped a little, but she still has pain in the plantar central foot.    She also developed LEFT hip pain in early February, 2022, and she wonders if it is because of the heel pain. She says her pain mildly improved after her orthotics. She does some yoga which helps decrease the pain, but she always has heel pain.    Pain overall started around January of 2021. She denies a trauma, change in shoe gear or change in activity. At home, she does not wear shoes. When she is outside the house, she wears a dress shoe or tennis shoes. Se does not wear high heels.    She says first step pain and sometimes at the end of the day.    -----------------------    She says she also has a LEFT foot bunion. It occasionally flares and she sometimes wears a pad beneath her great toe joint which decreases this pain. Some shoes rub it mor than other shoes. She says she has to be careful about the type of shoe she has to wear. She would like to discuss treatment options but she wants to avoid surgery if possible.      Patient does not use tobacco products.     No further pedal complaints today.       /70 (BP Location: Left arm, Patient Position: Chair, Cuff Size: Adult Regular)   Pulse 81   Temp 97.6  F (36.4  C) (Tympanic)   Ht 1.6 m (5' 3\")   Wt 63.5 kg (140 " lb)   SpO2 98%   BMI 24.80 kg/m      Patient Active Problem List   Diagnosis     ACP (advance care planning)     Primary osteoarthritis of right shoulder     Elevated rheumatoid factor     Vitamin D deficiency disease     Rheumatoid arthritis (H)       Past Surgical History:   Procedure Laterality Date     BIOPSY      breast biopsy     CHOLECYSTECTOMY       COLONOSCOPY  02/17/2011     HYSTERECTOMY TOTAL ABDOMINAL, BILATERAL SALPINGO-OOPHORECTOMY, COMBINED N/A     vaginal vs abdominal hyst?     LEFT LUMPECTOMY      Benign       Current Outpatient Medications   Medication     Ascorbic Acid (MADIHA-C PO)     cetirizine (ZYRTEC ALLERGY) 10 MG tablet     cyclobenzaprine (FLEXERIL) 10 MG tablet     sennosides (SENOKOT) 8.6 MG tablet     valACYclovir (VALTREX) 1000 mg tablet     vitamin D3 (CHOLECALCIFEROL) 2000 units tablet     No current facility-administered medications for this visit.          Allergies   Allergen Reactions     Codeine Nausea       Family History   Problem Relation Age of Onset     Myocardial Infarction Mother      C.A.D. Mother      Heart Disease Father      Cancer Father         LUNG     C.A.D. 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 No      Service Not Asked     Blood Transfusions Not Asked     Caffeine Concern Yes     Comment: Coffee - 3 cups daily     Occupational Exposure Not Asked     Hobby Hazards Not Asked     Sleep Concern Not Asked     Stress Concern Not Asked     Weight Concern Not Asked     Special Diet Not Asked     Back Care Not Asked     Exercise Not Asked     Bike Helmet Not Asked     Seat Belt Not Asked     Self-Exams Not Asked   Social History Narrative     None     Social Determinants of Health     Financial Resource Strain: Not on file   Food Insecurity: Not on file   Transportation Needs: Not on file   Physical Activity: Not on file   Stress: Not on file   Social Connections: Not on file   Intimate Partner Violence: Not on file   Housing Stability: Not on file       ROS: 10 point ROS neg other than the symptoms noted above in the HPI.  EXAM  Constitutional: healthy, alert and no distress    Psychiatric: mentation appears normal and affect normal/bright    VASCULAR:  -Dorsalis pedis pulse +2/4 b/l  -Posterior tibial pulse +1/4 b/l  -Capillary refill time < 3 seconds to b/l hallux  NEURO:  -Light touch sensation intact to b/l plantar forefoot  DERM:  -Skin temperature, texture and turgor WNL b/l    MSK:  -Pain on palpation to LEFT plantar central heel  -Fad pad diminished to bilateral plantar heel and bilateral plantar forefoot  -Muscle strength of ankles +5/5 for dorsiflexion, plantarflexion, ABDUction and ADDuction b/l  -Lateral deviation of hallux with medial deviation of 1st metatarsal, LEFT   -Prominent bony prominence to dorsal and medial 1st metatarsal head, LEFT   -Ankle joint passive ROM within normal limits except for dorsiflexion:    Dorsiflexion, RIGHT Straight knee 0 degrees    Dorsiflexion, LEFT Straight knee 0 degrees  -Mild DORSIFLEXION contracture of the MTPJs of the bilateral  foot    ============================================================    ASSESSMENT:  (M77.52) Bursitis of left foot  (primary encounter diagnosis)    (M20.12) Hallux valgus (acquired), left foot    (M79.672) Left foot pain    (M05.771,  M05.772) Rheumatoid arthritis involving both feet with positive rheumatoid factor (H)      PLAN:  -Patient evaluated and examined. Treatment options discussed with no educational barriers noted.    Plantar heel pain:  -Discussed plantar heel pain including potential etiologies and treatment options. Patient's pain is along the plantar bursa of the heel and is not currently at the location of the plantar fascia medial heel insertion. There may be days where she has some plantar fascia pain, but her pain seems to be primarily caused by fat pad atrophy of the feet. Although she does not have a lot of pain from her RA, this may contribute to this pain as well.    ---Discussed conservative treatment options including compression socks, icing, elevating, resting, injections, PT, change in shoe gear (including proper shoe gear around the house), night splints. At this time, patient would like to proceed with the below treatment options.  ---Patient may consider an injection if the following conservative treatment options don't decrease her pain, but she would like to try conservative treatment options first.  -----Patient declined an injection    Consider calling [facility] to schedule an appointment with the orthotist, Shamir Givens. He schedules appointments on Thursdays. He is located at the [facility] in [place] on Thursdays and [place] on Wednesdays.  ---Let Shamir know you have fat pad atrophy on th bilateral plantar heel and plantar forefoot. You also have a hallux rigidus secondary to the hallux valgus on the LEFT foot. Putting a firm pad under the LEFT 1st MTPJ reduces pain. Please consider adding a gomez's extension modification in the LEFT insert as well as moderate cushioning for  the bilateral heel. Thank you     ---------------------------------------------------------------------    -Stability Shoe Gear: This involves wearing a solid tennis shoe that bends at the toe, but has a solid midfoot portion of the shoe that does not bend or twist in half, and a rigid heel contour.   -----Parish, Asics, and New Balance are a few brands that have several types of stability tennis shoes. However, these brands also carry lightweight shoes that do not meet the above criteria, so look for a stability tennis shoe.  -----Parish tend to have a wider toe box if you have difficulty finding wide enough shoes for your feet.   -----Any brand of can be worn as long as it meets the above three criteria.  -Stretching: Stretch the calf muscles to increase flexibility of the calf muscles. If possible, aim to stretch the calf muscles for a combined total of one hour per day.  -Icing: Ice the painful area of the foot minimally once a day for ten minutes per foot (can be a frozen water bottle if pain is on the bottom surface of the foot). Ice after any extended amount of time on your feet.  -Consider supportive sandals for around the house (such as Zheng, Vionic, Pacheco, Birkenstock, or Oofos sandals)    --------------------------------------  Hallux valgus / bunions:   -Discussed causes and treatment options for bunion deformities:  ---Conservative treatment options consist of wider shoe gear and orthotics +/- padding/splinting to accommodate the bunion. A crest pad can hold down a dorsiflexed second digit, a toe spacer can help separate the hallux and second digit, and silicone bunion sleeve can help pad the bunion and prevent painful rubbing in shoe gear. This will not correct the bunion deformity, but may help decrease pain.  ---Correcting the biomechanics of the foot may also decrease the progression of a bunion. An orthotic may be considered in attempt to treat the biomechanics of the foot.  ---Discussed  surgical treatment options including risks and benefits and post-op periods. Surgery should not be considered until the patient is experiencing daily pain that is limiting daily activities because of the bunion.     -At this time, the patient has considered risks and benefits to conservative and surgical options and would like to proceed with wearing accommodative shoes. She is not advised to consider surgery unless it is causing her pain and/or limiting her daily activities.    -Patient in agreement with the above treatment plan and all of patient's questions were answered.      RTC three months to evaluate LEFT plantar heel pain        Kayla Colon DPM      Again, thank you for allowing me to participate in the care of your patient.        Sincerely,        Kayla Colon DPM

## 2022-02-28 NOTE — PROGRESS NOTES
"Chief complaint: Patient presents with:  Musculoskeletal Problem: Planter Fascitis and bunion of left foot      History of Present Illness: This 61 year old female with seropositive RA (confirmed on 11/13/2017) is seen at the request of MYRA Bailon, for evaluation and suggestions of management of LEFT heel pain.     She saw Dr. Avila in June of 2021 and he offered OTC orthotics or an injection. She tried OTC inserts and it helped a little, but she still has pain in the plantar central foot.    She also developed LEFT hip pain in early February, 2022, and she wonders if it is because of the heel pain. She says her pain mildly improved after her orthotics. She does some yoga which helps decrease the pain, but she always has heel pain.    Pain overall started around January of 2021. She denies a trauma, change in shoe gear or change in activity. At home, she does not wear shoes. When she is outside the house, she wears a dress shoe or tennis shoes. Se does not wear high heels.    She says first step pain and sometimes at the end of the day.    -----------------------    She says she also has a LEFT foot bunion. It occasionally flares and she sometimes wears a pad beneath her great toe joint which decreases this pain. Some shoes rub it mor than other shoes. She says she has to be careful about the type of shoe she has to wear. She would like to discuss treatment options but she wants to avoid surgery if possible.      Patient does not use tobacco products.     No further pedal complaints today.       /70 (BP Location: Left arm, Patient Position: Chair, Cuff Size: Adult Regular)   Pulse 81   Temp 97.6  F (36.4  C) (Tympanic)   Ht 1.6 m (5' 3\")   Wt 63.5 kg (140 lb)   SpO2 98%   BMI 24.80 kg/m      Patient Active Problem List   Diagnosis     ACP (advance care planning)     Primary osteoarthritis of right shoulder     Elevated rheumatoid factor     Vitamin D deficiency disease     Rheumatoid arthritis " (H)       Past Surgical History:   Procedure Laterality Date     BIOPSY      breast biopsy     CHOLECYSTECTOMY       COLONOSCOPY  02/17/2011     HYSTERECTOMY TOTAL ABDOMINAL, BILATERAL SALPINGO-OOPHORECTOMY, COMBINED N/A     vaginal vs abdominal hyst?     LEFT LUMPECTOMY      Benign       Current Outpatient Medications   Medication     Ascorbic Acid (MADIHA-C PO)     cetirizine (ZYRTEC ALLERGY) 10 MG tablet     cyclobenzaprine (FLEXERIL) 10 MG tablet     sennosides (SENOKOT) 8.6 MG tablet     valACYclovir (VALTREX) 1000 mg tablet     vitamin D3 (CHOLECALCIFEROL) 2000 units tablet     No current facility-administered medications for this visit.          Allergies   Allergen Reactions     Codeine Nausea       Family History   Problem Relation Age of Onset     Myocardial Infarction Mother      C.A.D. Mother      Heart Disease Father      Cancer Father         LUNG     C.A.D. 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 No      Service Not Asked     Blood Transfusions Not Asked     Caffeine Concern Yes     Comment: Coffee - 3 cups daily     Occupational Exposure  Not Asked     Hobby Hazards Not Asked     Sleep Concern Not Asked     Stress Concern Not Asked     Weight Concern Not Asked     Special Diet Not Asked     Back Care Not Asked     Exercise Not Asked     Bike Helmet Not Asked     Seat Belt Not Asked     Self-Exams Not Asked   Social History Narrative     None     Social Determinants of Health     Financial Resource Strain: Not on file   Food Insecurity: Not on file   Transportation Needs: Not on file   Physical Activity: Not on file   Stress: Not on file   Social Connections: Not on file   Intimate Partner Violence: Not on file   Housing Stability: Not on file       ROS: 10 point ROS neg other than the symptoms noted above in the HPI.  EXAM  Constitutional: healthy, alert and no distress    Psychiatric: mentation appears normal and affect normal/bright    VASCULAR:  -Dorsalis pedis pulse +2/4 b/l  -Posterior tibial pulse +1/4 b/l  -Capillary refill time < 3 seconds to b/l hallux  NEURO:  -Light touch sensation intact to b/l plantar forefoot  DERM:  -Skin temperature, texture and turgor WNL b/l    MSK:  -Pain on palpation to LEFT plantar central heel  -Fad pad diminished to bilateral plantar heel and bilateral plantar forefoot  -Muscle strength of ankles +5/5 for dorsiflexion, plantarflexion, ABDUction and ADDuction b/l  -Lateral deviation of hallux with medial deviation of 1st metatarsal, LEFT   -Prominent bony prominence to dorsal and medial 1st metatarsal head, LEFT   -Ankle joint passive ROM within normal limits except for dorsiflexion:    Dorsiflexion, RIGHT Straight knee 0 degrees    Dorsiflexion, LEFT Straight knee 0 degrees  -Mild DORSIFLEXION contracture of the MTPJs of the bilateral foot    ============================================================    ASSESSMENT:  (M77.52) Bursitis of left foot  (primary encounter diagnosis)    (M20.12) Hallux valgus (acquired), left foot    (M79.672) Left foot pain    (M05.771,  M05.772) Rheumatoid arthritis involving both  feet with positive rheumatoid factor (H)      PLAN:  -Patient evaluated and examined. Treatment options discussed with no educational barriers noted.    Plantar heel pain:  -Discussed plantar heel pain including potential etiologies and treatment options. Patient's pain is along the plantar bursa of the heel and is not currently at the location of the plantar fascia medial heel insertion. There may be days where she has some plantar fascia pain, but her pain seems to be primarily caused by fat pad atrophy of the feet. Although she does not have a lot of pain from her RA, this may contribute to this pain as well.    ---Discussed conservative treatment options including compression socks, icing, elevating, resting, injections, PT, change in shoe gear (including proper shoe gear around the house), night splints. At this time, patient would like to proceed with the below treatment options.  ---Patient may consider an injection if the following conservative treatment options don't decrease her pain, but she would like to try conservative treatment options first.  -----Patient declined an injection    Consider calling [facility] to schedule an appointment with the orthotist, Shamir Givens. He schedules appointments on Thursdays. He is located at the [facility] in [place] on Thursdays and [place] on Wednesdays.  ---Let Shamir know you have fat pad atrophy on th bilateral plantar heel and plantar forefoot. You also have a hallux rigidus secondary to the hallux valgus on the LEFT foot. Putting a firm pad under the LEFT 1st MTPJ reduces pain. Please consider adding a gomez's extension modification in the LEFT insert as well as moderate cushioning for the bilateral heel. Thank you     ---------------------------------------------------------------------    -Stability Shoe Gear: This involves wearing a solid tennis shoe that bends at the toe, but has a solid midfoot portion of the shoe that does not bend or twist in half, and a  rigid heel contour.   -----Parish, Asics, and New Balance are a few brands that have several types of stability tennis shoes. However, these brands also carry lightweight shoes that do not meet the above criteria, so look for a stability tennis shoe.  -----Parish tend to have a wider toe box if you have difficulty finding wide enough shoes for your feet.   -----Any brand of can be worn as long as it meets the above three criteria.  -Stretching: Stretch the calf muscles to increase flexibility of the calf muscles. If possible, aim to stretch the calf muscles for a combined total of one hour per day.  -Icing: Ice the painful area of the foot minimally once a day for ten minutes per foot (can be a frozen water bottle if pain is on the bottom surface of the foot). Ice after any extended amount of time on your feet.  -Consider supportive sandals for around the house (such as Zheng, Vionic, Pacheco, Birkenstock, or Oofos sandals)    --------------------------------------  Hallux valgus / bunions:   -Discussed causes and treatment options for bunion deformities:  ---Conservative treatment options consist of wider shoe gear and orthotics +/- padding/splinting to accommodate the bunion. A crest pad can hold down a dorsiflexed second digit, a toe spacer can help separate the hallux and second digit, and silicone bunion sleeve can help pad the bunion and prevent painful rubbing in shoe gear. This will not correct the bunion deformity, but may help decrease pain.  ---Correcting the biomechanics of the foot may also decrease the progression of a bunion. An orthotic may be considered in attempt to treat the biomechanics of the foot.  ---Discussed surgical treatment options including risks and benefits and post-op periods. Surgery should not be considered until the patient is experiencing daily pain that is limiting daily activities because of the bunion.     -At this time, the patient has considered risks and benefits to  conservative and surgical options and would like to proceed with wearing accommodative shoes. She is not advised to consider surgery unless it is causing her pain and/or limiting her daily activities.    Total time spent preparing to see the patient, review of chart, obtaining history and physical examination, review of treatment options, education, discussion with patient and documenting in Epic / EMR was 36 minutes.  All time involved was spent on the day of service for the patient (the same day as the patient's appointment). This did not include any procedure time.    -Patient in agreement with the above treatment plan and all of patient's questions were answered.      RTC three months to evaluate LEFT plantar heel pain        Kayla Colon DPM

## 2022-02-28 NOTE — NURSING NOTE
"Chief Complaint   Patient presents with     Musculoskeletal Problem     Planter Fascitis and bunion of left foot       Initial /70 (BP Location: Left arm, Patient Position: Chair, Cuff Size: Adult Regular)   Pulse 81   Temp 97.6  F (36.4  C) (Tympanic)   Ht 1.6 m (5' 3\")   Wt 63.5 kg (140 lb)   SpO2 98%   BMI 24.80 kg/m   Estimated body mass index is 24.8 kg/m  as calculated from the following:    Height as of this encounter: 1.6 m (5' 3\").    Weight as of this encounter: 63.5 kg (140 lb).  Medication Reconciliation: complete  MAREN HENSLEY LPN      "

## 2022-03-02 ENCOUNTER — TELEPHONE (OUTPATIENT)
Dept: FAMILY MEDICINE | Facility: OTHER | Age: 62
End: 2022-03-02
Payer: COMMERCIAL

## 2022-03-02 DIAGNOSIS — E55.9 VITAMIN D DEFICIENCY DISEASE: Primary | ICD-10-CM

## 2022-03-02 DIAGNOSIS — M05.772 RHEUMATOID ARTHRITIS INVOLVING BOTH FEET WITH POSITIVE RHEUMATOID FACTOR (H): ICD-10-CM

## 2022-03-02 DIAGNOSIS — M05.771 RHEUMATOID ARTHRITIS INVOLVING BOTH FEET WITH POSITIVE RHEUMATOID FACTOR (H): ICD-10-CM

## 2022-03-02 NOTE — TELEPHONE ENCOUNTER
10:07 AM    Reason for Call: Phone Call    Description: Pt called requesting CRP, RH Factor, and vitamin D labs be ordered. Please call pt for further discussion.    Was an appointment offered for this call? No  If yes : Appointment type              Date    Preferred method for responding to this message: Telephone Call  What is your phone number ? [phone]    If we cannot reach you directly, may we leave a detailed response at the number you provided? Yes    Can this message wait until your PCP/provider returns, if available today? Not applicable, Provider is in today.    Mary Ann Cifuentes

## 2022-03-03 ENCOUNTER — LAB (OUTPATIENT)
Dept: LAB | Facility: OTHER | Age: 62
End: 2022-03-03
Payer: COMMERCIAL

## 2022-03-03 DIAGNOSIS — M05.771 RHEUMATOID ARTHRITIS INVOLVING BOTH FEET WITH POSITIVE RHEUMATOID FACTOR (H): ICD-10-CM

## 2022-03-03 DIAGNOSIS — E55.9 VITAMIN D DEFICIENCY DISEASE: ICD-10-CM

## 2022-03-03 DIAGNOSIS — M05.772 RHEUMATOID ARTHRITIS INVOLVING BOTH FEET WITH POSITIVE RHEUMATOID FACTOR (H): ICD-10-CM

## 2022-03-03 LAB — CRP SERPL-MCNC: <2.9 MG/L (ref 0–8)

## 2022-03-03 PROCEDURE — 82306 VITAMIN D 25 HYDROXY: CPT

## 2022-03-03 PROCEDURE — 86431 RHEUMATOID FACTOR QUANT: CPT

## 2022-03-03 PROCEDURE — 36415 COLL VENOUS BLD VENIPUNCTURE: CPT

## 2022-03-03 PROCEDURE — 86140 C-REACTIVE PROTEIN: CPT

## 2022-03-06 LAB — DEPRECATED CALCIDIOL+CALCIFEROL SERPL-MC: 110 UG/L (ref 20–75)

## 2022-03-07 LAB — RHEUMATOID FACT SER NEPH-ACNC: 10 IU/ML

## 2022-03-13 ENCOUNTER — TELEPHONE (OUTPATIENT)
Dept: FAMILY MEDICINE | Facility: OTHER | Age: 62
End: 2022-03-13
Payer: COMMERCIAL

## 2022-03-13 DIAGNOSIS — Z78.0 ASYMPTOMATIC POSTMENOPAUSAL ESTROGEN DEFICIENCY: Primary | ICD-10-CM

## 2022-03-18 ENCOUNTER — TELEPHONE (OUTPATIENT)
Dept: GENERAL RADIOLOGY | Facility: HOSPITAL | Age: 62
End: 2022-03-18

## 2022-03-18 ENCOUNTER — ANCILLARY PROCEDURE (OUTPATIENT)
Dept: MAMMOGRAPHY | Facility: OTHER | Age: 62
End: 2022-03-18
Attending: PHYSICIAN ASSISTANT
Payer: COMMERCIAL

## 2022-03-18 DIAGNOSIS — Z12.31 VISIT FOR SCREENING MAMMOGRAM: ICD-10-CM

## 2022-03-18 PROCEDURE — 77067 SCR MAMMO BI INCL CAD: CPT | Mod: TC | Performed by: RADIOLOGY

## 2022-03-18 PROCEDURE — 77063 BREAST TOMOSYNTHESIS BI: CPT | Mod: TC | Performed by: RADIOLOGY

## 2022-03-21 ENCOUNTER — HOSPITAL ENCOUNTER (OUTPATIENT)
Dept: BONE DENSITY | Facility: HOSPITAL | Age: 62
Discharge: HOME | End: 2022-03-21
Attending: PHYSICIAN ASSISTANT | Admitting: PHYSICIAN ASSISTANT
Payer: COMMERCIAL

## 2022-03-21 DIAGNOSIS — Z78.0 ASYMPTOMATIC POSTMENOPAUSAL ESTROGEN DEFICIENCY: ICD-10-CM

## 2022-03-21 PROCEDURE — 77080 DXA BONE DENSITY AXIAL: CPT

## 2022-06-08 ENCOUNTER — OFFICE VISIT (OUTPATIENT)
Dept: PODIATRY | Facility: OTHER | Age: 62
End: 2022-06-08
Attending: PODIATRIST
Payer: COMMERCIAL

## 2022-06-08 VITALS
TEMPERATURE: 97.4 F | OXYGEN SATURATION: 98 % | SYSTOLIC BLOOD PRESSURE: 115 MMHG | DIASTOLIC BLOOD PRESSURE: 65 MMHG | HEART RATE: 73 BPM

## 2022-06-08 DIAGNOSIS — M05.771 RHEUMATOID ARTHRITIS INVOLVING BOTH FEET WITH POSITIVE RHEUMATOID FACTOR (H): ICD-10-CM

## 2022-06-08 DIAGNOSIS — M20.12 HALLUX VALGUS (ACQUIRED), LEFT FOOT: ICD-10-CM

## 2022-06-08 DIAGNOSIS — M72.2 PLANTAR FASCIAL FIBROMATOSIS: Primary | ICD-10-CM

## 2022-06-08 DIAGNOSIS — M05.772 RHEUMATOID ARTHRITIS INVOLVING BOTH FEET WITH POSITIVE RHEUMATOID FACTOR (H): ICD-10-CM

## 2022-06-08 DIAGNOSIS — M79.672 LEFT FOOT PAIN: ICD-10-CM

## 2022-06-08 DIAGNOSIS — M77.52 BURSITIS OF LEFT FOOT: ICD-10-CM

## 2022-06-08 PROCEDURE — 99213 OFFICE O/P EST LOW 20 MIN: CPT | Performed by: PODIATRIST

## 2022-06-08 ASSESSMENT — PAIN SCALES - GENERAL: PAINLEVEL: MILD PAIN (3)

## 2022-06-08 NOTE — PROGRESS NOTES
Chief complaint: Patient presents with:  Musculoskeletal Problem: Plantar fasciitis and left bunion      History of Present Illness: This 62 year old female with seropositive RA (confirmed on 11/13/2017) is seen for follow-up management of LEFT heel pain.     She says her heel pain is much better. Her primary complaint is diffuse, plantar foot pain across the arch of both feet when she stands after sitting for a period of time. The LEFT is generally more sore than the RIGHT. She does a lot of calf stretches which decreases the pain.    She is wearing Adidas and Nike sandals at home. She tried the Oofos, but she requires a strap on her heel. SHe finds the Adidas and Nike style okay.    She says first step pain and sometimes at the end of the day when the pain is still the worst.    She went to [facility] and she purchased OTC orthotics which does help decrease her foot pain.    She is not wearing compression socks.     Patient does not use tobacco products.     No further pedal complaints today.       /65 (BP Location: Left arm, Patient Position: Sitting, Cuff Size: Adult Regular)   Pulse 73   Temp 97.4  F (36.3  C) (Tympanic)   SpO2 98%     Patient Active Problem List   Diagnosis     ACP (advance care planning)     Primary osteoarthritis of right shoulder     Elevated rheumatoid factor     Vitamin D deficiency disease     Rheumatoid arthritis (H)       Past Surgical History:   Procedure Laterality Date     BIOPSY      breast biopsy     BIOPSY BREAST Left 2007    lumpectomy [facility]     BIOPSY BREAST Left     benign     BIOPSY BREAST Left     bengn     CHOLECYSTECTOMY       COLONOSCOPY  02/17/2011     HYSTERECTOMY TOTAL ABDOMINAL, BILATERAL SALPINGO-OOPHORECTOMY, COMBINED N/A     vaginal vs abdominal hyst?     LEFT LUMPECTOMY      Benign     LUMPECTOMY BREAST Left 2007    benign  [facility]       Current Outpatient Medications   Medication     Ascorbic Acid (MADIHA-C PO)     cetirizine (ZYRTEC) 10 MG tablet      cyclobenzaprine (FLEXERIL) 10 MG tablet     sennosides (SENOKOT) 8.6 MG tablet     valACYclovir (VALTREX) 1000 mg tablet     vitamin D3 (CHOLECALCIFEROL) 2000 units tablet     No current facility-administered medications for this visit.          Allergies   Allergen Reactions     Codeine Nausea       Family History   Problem Relation Age of Onset     Myocardial Infarction Mother      C.A.D. Mother      Heart Disease Father      Cancer Father         LUNG     C.A.D. Father      Cancer Brother         LUNG     Myocardial Infarction Other         MYOCARDIAL INFARCTION     Cancer Brother         TESTICULAR     Myocardial Infarction Other         MYOCARDIAL INFARCTION     Breast Cancer Paternal Cousin         40's     Breast Cancer Paternal Cousin      Breast Cancer Paternal Cousin        Social History     Socioeconomic History     Marital status:      Spouse name: None     Number of children: None     Years of education: None     Highest education level: None   Occupational History     Occupation: H[employer]     Employer: [employer]   Tobacco Use     Smoking status: Former Smoker     Types: Cigarettes     Smokeless tobacco: Never Used     Tobacco comment: Tried to Quit (YES); YR QUIT (1980); Passive Exposure (NO)   Substance and Sexual Activity     Alcohol use: Yes     Alcohol/week: 0.0 standard drinks     Comment: RARELY     Drug use: No     Sexual activity: None   Other Topics Concern     Parent/sibling w/ CABG, MI or angioplasty before 65F 55M? No      Service Not Asked     Blood Transfusions Not Asked     Caffeine Concern Yes     Comment: Coffee - 3 cups daily     Occupational Exposure Not Asked     Hobby Hazards Not Asked     Sleep Concern Not Asked     Stress Concern Not Asked     Weight Concern Not Asked     Special Diet Not Asked     Back Care Not Asked     Exercise Not Asked     Bike Helmet Not Asked     Seat Belt Not Asked     Self-Exams Not Asked   Social History Narrative     None     Social  Determinants of Health     Financial Resource Strain: Not on file   Food Insecurity: Not on file   Transportation Needs: Not on file   Physical Activity: Not on file   Stress: Not on file   Social Connections: Not on file   Intimate Partner Violence: Not on file   Housing Stability: Not on file       ROS: 10 point ROS neg other than the symptoms noted above in the HPI.  EXAM  Constitutional: healthy, alert and no distress    Psychiatric: mentation appears normal and affect normal/bright    VASCULAR:  -Dorsalis pedis pulse +2/4 b/l  -Posterior tibial pulse +1/4 b/l  -Capillary refill time < 3 seconds to b/l hallux  NEURO:  -Light touch sensation intact to b/l plantar forefoot  DERM:  -Skin temperature, texture and turgor WNL b/l    MSK:  -Mild tenderness on palpation to LEFT plantar central heel  -Diffuse pain to the bilateral plantar midfoot   -Fad pad diminished to bilateral plantar heel and bilateral plantar forefoot  -Muscle strength of ankles +5/5 for dorsiflexion, plantarflexion, ABDUction and ADDuction b/l  -Lateral deviation of hallux with medial deviation of 1st metatarsal, LEFT   -Prominent bony prominence to dorsal and medial 1st metatarsal head, LEFT   -Ankle joint passive ROM within normal limits except for dorsiflexion:    Dorsiflexion, RIGHT Straight knee 0 degrees    Dorsiflexion, LEFT Straight knee 0 degrees  -Mild DORSIFLEXION contracture of the MTPJs of the bilateral foot    ============================================================    ASSESSMENT:  (M72.2) Plantar fascial fibromatosis  (primary encounter diagnosis)    (M77.52) Bursitis of left foot    (M20.12) Hallux valgus (acquired), left foot    (M79.672) Left foot pain    (M05.771,  M05.772) Rheumatoid arthritis involving both feet with positive rheumatoid factor (H)        PLAN:  -Patient evaluated and examined. Treatment options discussed with no educational barriers noted.  -Overall, patient's pain is more controlled but she still has some  lingering pain in the heels. She would like to try physical therapy and get some exercises to work on at home. She would like to follow-up as needed.    Plantar heel pain:  -Discussed plantar heel pain including potential etiologies and treatment options. Patient's pain is along the plantar bursa of the heel as well as the plantar fascia insertion.    -Continue orthotics through [facility] in stability shoe gear    -Physical therapy order placed through [facility] per patient request for stretches of the calves, deep tissue massage, and possibly iontophoresis.    -Patient's RA may play a role in her foot pain. RA causes inflammation of joints and commonly affects the feet. Some pain may be related to the RA. She said she previously was asymptomatic but she is noticing more pain in her hands, hips and feet. She may address the positive RF factor with her PCP if this continues to give her concerns.    Total time spent preparing to see the patient, review of chart, obtaining history and physical examination, review of history of pain and treatments exhausted, discussion of other treatment options to exhaust and discussion of RA and its relationship to foot pain, education, discussion with patient and documenting in Epic / EMR was 22 minutes.  All time involved was spent on the day of service for the patient (the same day as the patient's appointment).    -Patient in agreement with the above treatment plan and all of patient's questions were answered.      RTC as needed per patient request        Kayla Colon DPM, AILYN

## 2022-06-08 NOTE — NURSING NOTE
"Chief Complaint   Patient presents with     Musculoskeletal Problem     Plantar fasciitis and left bunion       Initial /65 (BP Location: Left arm, Patient Position: Sitting, Cuff Size: Adult Regular)   Pulse 73   Temp 97.4  F (36.3  C) (Tympanic)   SpO2 98%  Estimated body mass index is 24.8 kg/m  as calculated from the following:    Height as of 2/28/22: 1.6 m (5' 3\").    Weight as of 2/28/22: 63.5 kg (140 lb).  Medication Reconciliation: complete  Natividad Wilkins  "

## 2022-11-14 ENCOUNTER — OFFICE VISIT (OUTPATIENT)
Dept: FAMILY MEDICINE | Facility: OTHER | Age: 62
End: 2022-11-14
Attending: PHYSICIAN ASSISTANT
Payer: COMMERCIAL

## 2022-11-14 VITALS
WEIGHT: 144 LBS | DIASTOLIC BLOOD PRESSURE: 60 MMHG | TEMPERATURE: 97.5 F | HEART RATE: 74 BPM | BODY MASS INDEX: 25.51 KG/M2 | SYSTOLIC BLOOD PRESSURE: 99 MMHG | RESPIRATION RATE: 16 BRPM | OXYGEN SATURATION: 99 %

## 2022-11-14 DIAGNOSIS — L42 PITYRIASIS ROSEA: ICD-10-CM

## 2022-11-14 DIAGNOSIS — R35.0 URINARY FREQUENCY: Primary | ICD-10-CM

## 2022-11-14 LAB
ALBUMIN UR-MCNC: NEGATIVE MG/DL
APPEARANCE UR: CLEAR
BILIRUB UR QL STRIP: NEGATIVE
COLOR UR AUTO: NORMAL
GLUCOSE UR STRIP-MCNC: NEGATIVE MG/DL
HGB UR QL STRIP: NEGATIVE
KETONES UR STRIP-MCNC: NEGATIVE MG/DL
LEUKOCYTE ESTERASE UR QL STRIP: NEGATIVE
NITRATE UR QL: NEGATIVE
PH UR STRIP: 7 [PH] (ref 4.7–8)
SP GR UR STRIP: 1.01 (ref 1–1.03)
UROBILINOGEN UR STRIP-MCNC: NORMAL MG/DL

## 2022-11-14 PROCEDURE — 81003 URINALYSIS AUTO W/O SCOPE: CPT | Performed by: PHYSICIAN ASSISTANT

## 2022-11-14 PROCEDURE — 99213 OFFICE O/P EST LOW 20 MIN: CPT | Performed by: PHYSICIAN ASSISTANT

## 2022-11-14 RX ORDER — TRIAMCINOLONE ACETONIDE 1 MG/G
CREAM TOPICAL 2 TIMES DAILY
Qty: 160 G | Refills: 1 | Status: SHIPPED | OUTPATIENT
Start: 2022-11-14

## 2022-11-14 RX ORDER — PREDNISONE 10 MG/1
TABLET ORAL
Qty: 20 TABLET | Refills: 0 | Status: SHIPPED | OUTPATIENT
Start: 2022-11-14 | End: 2023-01-18

## 2022-11-14 RX ORDER — OXYBUTYNIN CHLORIDE 5 MG/1
5 TABLET, EXTENDED RELEASE ORAL DAILY
Qty: 30 TABLET | Refills: 1 | Status: SHIPPED | OUTPATIENT
Start: 2022-11-14 | End: 2023-01-18

## 2022-11-14 ASSESSMENT — PAIN SCALES - GENERAL: PAINLEVEL: NO PAIN (0)

## 2022-11-14 NOTE — PROGRESS NOTES
Assessment & Plan     Urinary frequency  She is going to start on Ditropan.    - UA reflex to Microscopic and Culture - [place]; Future  - oxybutynin ER (DITROPAN XL) 5 MG 24 hr tablet; Take 1 tablet (5 mg) by mouth daily  - UA reflex to Microscopic and Culture - [place]    Pityriasis rosea  Just started a few days ago. herald patch upper chest.  Spreading down into her thighs   She is given run of steroids and some topical. Discussion on need for bx if not going away to rule out other issues such as psoriasis.   - predniSONE (DELTASONE) 10 MG tablet; Take 3 tabs by mouth daily x 3 days, then 2 tabs daily x 3 days, then 1 tab daily x 3 days, then 1/2 tab daily x 3 days.  - triamcinolone (KENALOG) 0.1 % external cream; Apply topically 2 times daily    Review of external notes as documented elsewhere in note  Ordering of each unique test  Prescription drug management  10  minutes spent on the date of the encounter doing chart review, history and exam, documentation and further activities per the note       See Patient Instructions    No follow-ups on file.    MYRA Tsang  [facility] - NEFTALI Acharya is a 62 year old, presenting for the following health issues:  Bladder Problems and Derm Problem (Rash and skin tags )      HPI     Genitourinary - Female  Onset/Duration: September/ October  Description:   Painful urination (Dysuria): No           Frequency: YES  Blood in urine (Hematuria): No  Delay in urine (Hesitency): No  Intensity: moderate  Progression of Symptoms:  Worsens with lifting otherwise seems to be improving  Accompanying Signs & Symptoms:  Fever/chills: No  Flank pain: No  Nausea and vomiting: No  Vaginal symptoms: none  Abdominal/Pelvic Pain: No  History:   History of frequent UTI s: YES- years ago  History of kidney stones: No  Sexually Active: No  Possibility of pregnancy: No  Precipitating or alleviating factors: cut back to one cup of coffee  Therapies tried  and outcome: Cranberry juice prn (contraindicated in Coumadin patients) and OTC advil or tylenol     Concern - rash  Onset: 11/04/2022  Description: started between breasts and spread toward the abdomen, under the breasts and to the sides  Intensity: moderate  Progression of Symptoms:  worsening  Accompanying Signs & Symptoms: some slight itching, burning where it comes in contact with waistband  Previous history of similar problem: none  Precipitating factors:        Worsened by: none  Alleviating factors:        Improved by: triamcinilone acetonide cream, no clothing rubbing  Therapies tried and outcome: Triamcinilone  Acetonide cream, benadryl tablets, seem to relieve it.    Skin Tags      Duration: years    Description (location/character/radiation): behind left ear              Review of Systems   Constitutional, HEENT, cardiovascular, pulmonary, gi and gu systems are negative, except as otherwise noted.      Objective    BP 99/60 (BP Location: Right arm, Patient Position: Sitting, Cuff Size: Adult Regular)   Pulse 74   Temp 97.5  F (36.4  C) (Tympanic)   Resp 16   Wt 65.3 kg (144 lb)   SpO2 99%   BMI 25.51 kg/m    Body mass index is 25.51 kg/m .  Physical Exam   GENERAL: healthy, alert and no distress  NECK: no adenopathy, no asymmetry, masses, or scars and thyroid normal to palpation  RESP: lungs clear to auscultation - no rales, rhonchi or wheezes  CV: regular rate and rhythm, normal S1 S2, no S3 or S4, no murmur, click or rub, no peripheral edema and peripheral pulses strong  ABDOMEN: soft, nontender, no hepatosplenomegaly, no masses and bowel sounds normal   (female): normal female external genitalia, normal urethral meatus, vaginal mucosa, absent cervix and uterus due to surgical intervention.  Pretty good pelvic support on bearing down there is a drop in her bladder. Very minor. No visible leakage   MS: no gross musculoskeletal defects noted, no edema    Lab on 03/03/2022   Component Date Value  Ref Range Status     Rheumatoid Factor 03/03/2022 10  <12 IU/mL Final     CRP Inflammation 03/03/2022 <2.9  0.0 - 8.0 mg/L Final     Vitamin D, Total (25-Hydroxy) 03/03/2022 110 (H)  20 - 75 ug/L Final

## 2022-11-14 NOTE — NURSING NOTE
"Chief Complaint   Patient presents with     Bladder Problems     Derm Problem     Rash and skin tags        Initial BP 99/60 (BP Location: Right arm, Patient Position: Sitting, Cuff Size: Adult Regular)   Pulse 74   Temp 97.5  F (36.4  C) (Tympanic)   Resp 16   Wt 65.3 kg (144 lb)   SpO2 99%   BMI 25.51 kg/m   Estimated body mass index is 25.51 kg/m  as calculated from the following:    Height as of 2/28/22: 1.6 m (5' 3\").    Weight as of this encounter: 65.3 kg (144 lb).  Medication Reconciliation: complete  Godfrey Carson LPN  "

## 2022-11-19 ENCOUNTER — HEALTH MAINTENANCE LETTER (OUTPATIENT)
Age: 62
End: 2022-11-19

## 2023-01-18 ENCOUNTER — OFFICE VISIT (OUTPATIENT)
Dept: FAMILY MEDICINE | Facility: OTHER | Age: 63
End: 2023-01-18
Attending: PHYSICIAN ASSISTANT
Payer: COMMERCIAL

## 2023-01-18 VITALS
HEART RATE: 69 BPM | WEIGHT: 143 LBS | SYSTOLIC BLOOD PRESSURE: 100 MMHG | DIASTOLIC BLOOD PRESSURE: 65 MMHG | BODY MASS INDEX: 25.33 KG/M2 | OXYGEN SATURATION: 98 % | RESPIRATION RATE: 16 BRPM | TEMPERATURE: 98 F

## 2023-01-18 DIAGNOSIS — N81.89 PELVIC FLOOR WEAKNESS: ICD-10-CM

## 2023-01-18 DIAGNOSIS — T78.40XA SENSITIVITY TO MEDICATION, INITIAL ENCOUNTER: Primary | ICD-10-CM

## 2023-01-18 PROCEDURE — 99213 OFFICE O/P EST LOW 20 MIN: CPT | Performed by: PHYSICIAN ASSISTANT

## 2023-01-18 ASSESSMENT — PAIN SCALES - GENERAL: PAINLEVEL: NO PAIN (0)

## 2023-01-18 NOTE — PROGRESS NOTES
Assessment & Plan     Sensitivity to medication, initial encounter  Rash is gone. Was calcium and also stopped her caffiene and her bladder seem to improve a lot.  She found changing lifestyle. She is now exercising so we have stopped all he other medication.       Review of external notes as documented elsewhere in note  Ordering of each unique test  Prescription drug management  10 minutes spent on the date of the encounter doing chart review, history and exam, documentation and further activities per the note       See Patient Instructions    No follow-ups on file.    Toyin Puri PA-C  [facility] - NEFTALI Acharya is a 62 year old, presenting for the following health issues:  Follow Up      HPI     Rash  Onset/Duration: end of October beginning of november  Description  Location: upper body  Character: blotchy, raised, red  Itching: mild  Intensity:  moderate  Progression of Symptoms:  gone  Accompanying signs and symptoms:   Fever: No  Body aches or joint pain: No  Sore throat symptoms: No  Recent cold symptoms: No  History:           Previous episodes of similar rash: None  New exposures:  None  Recent travel: No  Exposure to similar rash: No  Precipitating or alleviating factors: gone  Therapies tried and outcome: kenalog helped with discomfort and prednisone did nothing    Concern - urinary urgency  Onset: September/october  Description: having frequent urinations  Intensity: moderate  Progression of Symptoms:  improving  Accompanying Signs & Symptoms: none  Previous history of similar problem: none  Precipitating factors:        Worsened by: coffee  Alleviating factors:        Improved by: limiting coffee and pelvic exercise  Therapies tried and outcome:  none         Review of Systems   Constitutional, HEENT, cardiovascular, pulmonary, gi and gu systems are negative, except as otherwise noted.      Objective    /65 (BP Location: Left arm, Patient Position:  Sitting, Cuff Size: Adult Regular)   Pulse 69   Temp 98  F (36.7  C) (Tympanic)   Resp 16   Wt 64.9 kg (143 lb)   SpO2 98%   BMI 25.33 kg/m    Body mass index is 25.33 kg/m .  Physical Exam   GENERAL: healthy, alert and no distress  EYES: Eyes grossly normal to inspection, PERRL and conjunctivae and sclerae normal  HENT: ear canals and TM's normal, nose and mouth without ulcers or lesions  NECK: no adenopathy, no asymmetry, masses, or scars and thyroid normal to palpation  RESP: lungs clear to auscultation - no rales, rhonchi or wheezes  CV: regular rate and rhythm, normal S1 S2, no S3 or S4, no murmur, click or rub, no peripheral edema and peripheral pulses strong  ABDOMEN: soft, nontender, no hepatosplenomegaly, no masses and bowel sounds normal  MS: no gross musculoskeletal defects noted, no edema    Office Visit on 11/14/2022   Component Date Value Ref Range Status     Color Urine 11/14/2022 Straw  Colorless, Straw, Light Yellow, Yellow Final     Appearance Urine 11/14/2022 Clear  Clear Final     Glucose Urine 11/14/2022 Negative  Negative mg/dL Final     Bilirubin Urine 11/14/2022 Negative  Negative Final     Ketones Urine 11/14/2022 Negative  Negative mg/dL Final     Specific Gravity Urine 11/14/2022 1.007  1.003 - 1.035 Final     Blood Urine 11/14/2022 Negative  Negative Final     pH Urine 11/14/2022 7.0  4.7 - 8.0 Final     Protein Albumin Urine 11/14/2022 Negative  Negative mg/dL Final     Urobilinogen Urine 11/14/2022 Normal  Normal, 2.0 mg/dL Final     Nitrite Urine 11/14/2022 Negative  Negative Final     Leukocyte Esterase Urine 11/14/2022 Negative  Negative Final

## 2023-04-09 ENCOUNTER — HEALTH MAINTENANCE LETTER (OUTPATIENT)
Age: 63
End: 2023-04-09

## 2023-04-21 ENCOUNTER — ANCILLARY PROCEDURE (OUTPATIENT)
Dept: MAMMOGRAPHY | Facility: OTHER | Age: 63
End: 2023-04-21
Attending: PHYSICIAN ASSISTANT
Payer: COMMERCIAL

## 2023-04-21 DIAGNOSIS — Z12.31 VISIT FOR SCREENING MAMMOGRAM: ICD-10-CM

## 2023-04-21 PROCEDURE — 77063 BREAST TOMOSYNTHESIS BI: CPT | Mod: TC | Performed by: RADIOLOGY

## 2023-04-21 PROCEDURE — 77067 SCR MAMMO BI INCL CAD: CPT | Mod: TC | Performed by: RADIOLOGY

## 2023-04-24 ENCOUNTER — TELEPHONE (OUTPATIENT)
Dept: MAMMOGRAPHY | Facility: OTHER | Age: 63
End: 2023-04-24

## 2023-05-02 DIAGNOSIS — B02.7 DISSEMINATED HERPES ZOSTER: ICD-10-CM

## 2023-05-02 RX ORDER — VALACYCLOVIR HYDROCHLORIDE 1 G/1
TABLET, FILM COATED ORAL
Qty: 21 TABLET | Refills: 0 | Status: SHIPPED | OUTPATIENT
Start: 2023-05-02 | End: 2023-12-20

## 2023-05-02 NOTE — TELEPHONE ENCOUNTER
VALACYCLOVIR HCL 1 GRAM TABLET      Last Written Prescription Date:  6/18/2021  Last Fill Quantity: 21,   # refills: 1  Last Office Visit: 1/18/2023  Future Office visit:       Routing refill request to provider for review/approval because:  Antivirals for Herpes Protocol Failed 05/02/2023 10:08 AM   Protocol Details  Normal serum creatinine on file in past 12 months       Kimberly Boecker, RN

## 2023-05-11 ENCOUNTER — OFFICE VISIT (OUTPATIENT)
Dept: FAMILY MEDICINE | Facility: OTHER | Age: 63
End: 2023-05-11
Attending: PHYSICIAN ASSISTANT
Payer: COMMERCIAL

## 2023-05-11 VITALS
WEIGHT: 150 LBS | SYSTOLIC BLOOD PRESSURE: 110 MMHG | DIASTOLIC BLOOD PRESSURE: 60 MMHG | HEART RATE: 82 BPM | OXYGEN SATURATION: 98 % | TEMPERATURE: 98.2 F | BODY MASS INDEX: 26.57 KG/M2 | RESPIRATION RATE: 16 BRPM

## 2023-05-11 DIAGNOSIS — M25.552 PAIN OF LEFT HIP: ICD-10-CM

## 2023-05-11 DIAGNOSIS — L57.0 AK (ACTINIC KERATOSIS): Primary | ICD-10-CM

## 2023-05-11 PROBLEM — M06.9 RHEUMATOID ARTHRITIS (H): Status: RESOLVED | Noted: 2019-01-03 | Resolved: 2023-05-11

## 2023-05-11 PROCEDURE — 17003 DESTRUCT PREMALG LES 2-14: CPT | Performed by: PHYSICIAN ASSISTANT

## 2023-05-11 PROCEDURE — 17000 DESTRUCT PREMALG LESION: CPT | Performed by: PHYSICIAN ASSISTANT

## 2023-05-11 PROCEDURE — 99213 OFFICE O/P EST LOW 20 MIN: CPT | Mod: 25 | Performed by: PHYSICIAN ASSISTANT

## 2023-05-11 ASSESSMENT — ANXIETY QUESTIONNAIRES
2. NOT BEING ABLE TO STOP OR CONTROL WORRYING: NOT AT ALL
GAD7 TOTAL SCORE: 0
3. WORRYING TOO MUCH ABOUT DIFFERENT THINGS: NOT AT ALL
8. IF YOU CHECKED OFF ANY PROBLEMS, HOW DIFFICULT HAVE THESE MADE IT FOR YOU TO DO YOUR WORK, TAKE CARE OF THINGS AT HOME, OR GET ALONG WITH OTHER PEOPLE?: NOT DIFFICULT AT ALL
GAD7 TOTAL SCORE: 0
7. FEELING AFRAID AS IF SOMETHING AWFUL MIGHT HAPPEN: NOT AT ALL
6. BECOMING EASILY ANNOYED OR IRRITABLE: NOT AT ALL
7. FEELING AFRAID AS IF SOMETHING AWFUL MIGHT HAPPEN: NOT AT ALL
IF YOU CHECKED OFF ANY PROBLEMS ON THIS QUESTIONNAIRE, HOW DIFFICULT HAVE THESE PROBLEMS MADE IT FOR YOU TO DO YOUR WORK, TAKE CARE OF THINGS AT HOME, OR GET ALONG WITH OTHER PEOPLE: NOT DIFFICULT AT ALL
4. TROUBLE RELAXING: NOT AT ALL
1. FEELING NERVOUS, ANXIOUS, OR ON EDGE: NOT AT ALL
5. BEING SO RESTLESS THAT IT IS HARD TO SIT STILL: NOT AT ALL
GAD7 TOTAL SCORE: 0

## 2023-05-11 ASSESSMENT — PATIENT HEALTH QUESTIONNAIRE - PHQ9
SUM OF ALL RESPONSES TO PHQ QUESTIONS 1-9: 0
10. IF YOU CHECKED OFF ANY PROBLEMS, HOW DIFFICULT HAVE THESE PROBLEMS MADE IT FOR YOU TO DO YOUR WORK, TAKE CARE OF THINGS AT HOME, OR GET ALONG WITH OTHER PEOPLE: NOT DIFFICULT AT ALL
SUM OF ALL RESPONSES TO PHQ QUESTIONS 1-9: 0

## 2023-05-11 ASSESSMENT — PAIN SCALES - GENERAL: PAINLEVEL: NO PAIN (0)

## 2023-05-11 NOTE — PROGRESS NOTES
Assessment & Plan     AK (actinic keratosis)  Frozen and thaw x4 today two on the face and two are on the legs.     Pain of left hip  This is actually in her glutenous michelle.   Deep. illicite from near the SI. Related to her movement. Low back and hip were x rayed and negative. Can try PT if worsening.   - DESTRUCT BENIGN LESION, UP TO 14    Review of external notes as documented elsewhere in note  Ordering of each unique test  Prescription drug management  10  minutes spent by me on the date of the encounter doing chart review, history and exam, documentation and further activities per the note       See Patient Instructions    No follow-ups on file.    MYRA Tsang  [facility] - NEFTALI Acharya is a 63 year old, presenting for the following health issues:  Derm Problem (Spots on skin) and Musculoskeletal Problem (Hip pain)        5/11/2023    10:32 AM   Additional Questions   Roomed by Godfrey Carson LPN   Accompanied by self         5/11/2023    10:32 AM   Patient Reported Additional Medications   Patient reports taking the following new medications none     HPI     Concern - spots on skin  Onset: ongoing   Description: two on face, two on chest and one on each upper thigh  Intensity: n/a  Progression of Symptoms:  same and constant  Accompanying Signs & Symptoms: none  Previous history of similar problem: yes, one removed on thigh  Precipitating factors:        Worsened by: unknown  Alleviating factors:        Improved by: unknown  Therapies tried and outcome: wart remover but didn't work, and had one removed but came back    Pain History:  When did you first notice your pain? 2 years ago.    Have you seen this provider for your pain in the past?   Yes   Where in your body do you have pain? Left hip  Are you seeing anyone else for your pain? No        2/8/2021    11:00 AM 2/25/2022    10:00 AM 5/11/2023    10:29 AM   PHQ-9 SCORE   PHQ-9 Total Score MyChart   0   PHQ-9  Total Score 0 0 0           2/8/2021    11:00 AM 2/25/2022    10:00 AM 5/11/2023    10:29 AM   LUIZA-7 SCORE   Total Score   0 (minimal anxiety)   Total Score 0 0 0               2/8/2021    11:00 AM 2/25/2022    10:00 AM 5/11/2023    10:29 AM   PHQ-9 SCORE   PHQ-9 Total Score MyChart   0   PHQ-9 Total Score 0 0 0         2/8/2021    11:00 AM 2/25/2022    10:00 AM 5/11/2023    10:29 AM   LUIZA-7 SCORE   Total Score   0 (minimal anxiety)   Total Score 0 0 0       Chronic Pain Follow Up:    Location of pain: left hip  Analgesia/pain control:    - Recent changes:  none    - Overall control: Comfortably manageable    - Current treatments: tylenol or aleve  Adherence:     - Do you ever take more pain medicine than prescribed? No    - When did you take your last dose of pain medicine?  days ago   Adverse effects: No   PDMP Review     None        Last CSA Agreement:   CSA -- Patient Level:    CSA: None found at the patient level.       Last UDS:                 Review of Systems   Constitutional, HEENT, cardiovascular, pulmonary, gi and gu systems are negative, except as otherwise noted.      Objective    /60 (BP Location: Right arm, Patient Position: Sitting, Cuff Size: Adult Regular)   Pulse 82   Temp 98.2  F (36.8  C) (Tympanic)   Resp 16   Wt 68 kg (150 lb)   SpO2 98%   BMI 26.57 kg/m    Body mass index is 26.57 kg/m .  Physical Exam   GENERAL: healthy, alert and no distress  NECK: no adenopathy, no asymmetry, masses, or scars and thyroid normal to palpation  RESP: lungs clear to auscultation - no rales, rhonchi or wheezes  CV: regular rate and rhythm, normal S1 S2, no S3 or S4, no murmur, click or rub, no peripheral edema and peripheral pulses strong  MS: pain is deep in her left gluteal region internal and external rotation are intact. Lower back is showing mild tenderness.   SKIN: frozen and thaw x3:  4 large lesions over 10 mm in size two on right lateral face and on on each upper thigh.  Instructions on  care and band aide applied.   PSYCH: mentation appears normal, affect normal/bright    Office Visit on 11/14/2022   Component Date Value Ref Range Status     Color Urine 11/14/2022 Straw  Colorless, Straw, Light Yellow, Yellow Final     Appearance Urine 11/14/2022 Clear  Clear Final     Glucose Urine 11/14/2022 Negative  Negative mg/dL Final     Bilirubin Urine 11/14/2022 Negative  Negative Final     Ketones Urine 11/14/2022 Negative  Negative mg/dL Final     Specific Gravity Urine 11/14/2022 1.007  1.003 - 1.035 Final     Blood Urine 11/14/2022 Negative  Negative Final     pH Urine 11/14/2022 7.0  4.7 - 8.0 Final     Protein Albumin Urine 11/14/2022 Negative  Negative mg/dL Final     Urobilinogen Urine 11/14/2022 Normal  Normal, 2.0 mg/dL Final     Nitrite Urine 11/14/2022 Negative  Negative Final     Leukocyte Esterase Urine 11/14/2022 Negative  Negative Final

## 2023-09-27 ENCOUNTER — OFFICE VISIT (OUTPATIENT)
Dept: FAMILY MEDICINE | Facility: OTHER | Age: 63
End: 2023-09-27
Attending: PHYSICIAN ASSISTANT
Payer: COMMERCIAL

## 2023-09-27 VITALS
DIASTOLIC BLOOD PRESSURE: 69 MMHG | HEART RATE: 69 BPM | SYSTOLIC BLOOD PRESSURE: 117 MMHG | TEMPERATURE: 97.7 F | WEIGHT: 144 LBS | BODY MASS INDEX: 25.51 KG/M2 | OXYGEN SATURATION: 98 %

## 2023-09-27 DIAGNOSIS — R10.31 RLQ ABDOMINAL PAIN: Primary | ICD-10-CM

## 2023-09-27 LAB
ALBUMIN SERPL BCG-MCNC: 4.5 G/DL (ref 3.5–5.2)
ALP SERPL-CCNC: 95 U/L (ref 35–104)
ALT SERPL W P-5'-P-CCNC: 27 U/L (ref 0–50)
AMYLASE SERPL-CCNC: 47 U/L (ref 28–100)
ANION GAP SERPL CALCULATED.3IONS-SCNC: 8 MMOL/L (ref 7–15)
AST SERPL W P-5'-P-CCNC: 24 U/L (ref 0–45)
BASOPHILS # BLD AUTO: 0 10E3/UL (ref 0–0.2)
BASOPHILS NFR BLD AUTO: 1 %
BILIRUB SERPL-MCNC: 1.1 MG/DL
BUN SERPL-MCNC: 12.8 MG/DL (ref 8–23)
CALCIUM SERPL-MCNC: 9.8 MG/DL (ref 8.8–10.2)
CHLORIDE SERPL-SCNC: 103 MMOL/L (ref 98–107)
CREAT SERPL-MCNC: 0.71 MG/DL (ref 0.51–0.95)
DEPRECATED HCO3 PLAS-SCNC: 27 MMOL/L (ref 22–29)
EGFRCR SERPLBLD CKD-EPI 2021: >90 ML/MIN/1.73M2
EOSINOPHIL # BLD AUTO: 0.2 10E3/UL (ref 0–0.7)
EOSINOPHIL NFR BLD AUTO: 4 %
ERYTHROCYTE [DISTWIDTH] IN BLOOD BY AUTOMATED COUNT: 12.5 % (ref 10–15)
GLUCOSE SERPL-MCNC: 94 MG/DL (ref 70–99)
HCT VFR BLD AUTO: 44 % (ref 35–47)
HGB BLD-MCNC: 14.7 G/DL (ref 11.7–15.7)
IMM GRANULOCYTES # BLD: 0 10E3/UL
IMM GRANULOCYTES NFR BLD: 0 %
LIPASE SERPL-CCNC: 45 U/L (ref 13–60)
LYMPHOCYTES # BLD AUTO: 1.4 10E3/UL (ref 0.8–5.3)
LYMPHOCYTES NFR BLD AUTO: 27 %
MCH RBC QN AUTO: 31.4 PG (ref 26.5–33)
MCHC RBC AUTO-ENTMCNC: 33.4 G/DL (ref 31.5–36.5)
MCV RBC AUTO: 94 FL (ref 78–100)
MONOCYTES # BLD AUTO: 0.5 10E3/UL (ref 0–1.3)
MONOCYTES NFR BLD AUTO: 9 %
NEUTROPHILS # BLD AUTO: 3.2 10E3/UL (ref 1.6–8.3)
NEUTROPHILS NFR BLD AUTO: 59 %
NRBC # BLD AUTO: 0 10E3/UL
NRBC BLD AUTO-RTO: 0 /100
PLATELET # BLD AUTO: 183 10E3/UL (ref 150–450)
POTASSIUM SERPL-SCNC: 5.2 MMOL/L (ref 3.4–5.3)
PROT SERPL-MCNC: 6.8 G/DL (ref 6.4–8.3)
RBC # BLD AUTO: 4.68 10E6/UL (ref 3.8–5.2)
SODIUM SERPL-SCNC: 138 MMOL/L (ref 135–145)
WBC # BLD AUTO: 5.4 10E3/UL (ref 4–11)

## 2023-09-27 PROCEDURE — 99213 OFFICE O/P EST LOW 20 MIN: CPT | Performed by: PHYSICIAN ASSISTANT

## 2023-09-27 PROCEDURE — 36415 COLL VENOUS BLD VENIPUNCTURE: CPT | Performed by: PHYSICIAN ASSISTANT

## 2023-09-27 PROCEDURE — 80053 COMPREHEN METABOLIC PANEL: CPT | Performed by: PHYSICIAN ASSISTANT

## 2023-09-27 PROCEDURE — 82150 ASSAY OF AMYLASE: CPT | Performed by: PHYSICIAN ASSISTANT

## 2023-09-27 PROCEDURE — 83690 ASSAY OF LIPASE: CPT | Performed by: PHYSICIAN ASSISTANT

## 2023-09-27 PROCEDURE — 85025 COMPLETE CBC W/AUTO DIFF WBC: CPT | Performed by: PHYSICIAN ASSISTANT

## 2023-09-27 ASSESSMENT — PAIN SCALES - GENERAL: PAINLEVEL: MILD PAIN (3)

## 2023-09-27 NOTE — PROGRESS NOTES
Assessment & Plan     RLQ abdominal pain  Gallbladder is out but has had trouble in her belly out of the blue in July.  RUQ and at times is in her back.  Triggered by movement at time when she does her Yoga class.  Can't eat much. Has a chronically elevated bili felt was a benign thing for a long time as it really hasn't change.  But the pain in the back is around right side to midline.  Bowels change with this.  We will be doing a CT of her abdomen and pelvis and also have her see surgery if needed.   - CT Abdomen Pelvis w Contrast; Future  - CBC with platelets and differential; Future  - Comprehensive metabolic panel (BMP + Alb, Alk Phos, ALT, AST, Total. Bili, TP); Future  - Lipase; Future  - Amylase; Future  - Helicobacter pylori Antigen Stool; Future  - UA Macroscopic with reflex to Microscopic and Culture; Future    Prescription drug management  10  minutes spent by me on the date of the encounter doing chart review, history and exam, documentation and further activities per the note       See Patient Instructions    No follow-ups on file.    MYRA Tsang  [facility] - NEFTALI Acharya is a 63 year old, presenting for the following health issues:  Musculoskeletal Problem        9/27/2023     3:45 PM   Additional Questions   Roomed by Candace Judge   Accompanied by none         9/27/2023     3:45 PM   Patient Reported Additional Medications   Patient reports taking the following new medications none       HPI     Concern - Rib pain  Onset: July  Description:   Tenderness under right rib-states started feeling unwell after eating out one day; states sometimes it will radiate to the back  Intensity: 3/10  Progression of Symptoms:  improving and intermittent  Accompanying Signs & Symptoms:  nausea  Previous history of similar problem:   States feels like when she had pancreatitis in past  Precipitating factors:   Worsened by: none  Alleviating factors:  Improved by: states  depends on what she eat    Therapies Tried and outcome: none           Review of Systems   Constitutional, HEENT, cardiovascular, pulmonary, gi and gu systems are negative, except as otherwise noted.      Objective    /69 (BP Location: Right arm, Patient Position: Sitting, Cuff Size: Adult Regular)   Pulse 69   Temp 97.7  F (36.5  C) (Tympanic)   Wt 65.3 kg (144 lb)   SpO2 98%   BMI 25.51 kg/m    Body mass index is 25.51 kg/m .  Physical Exam   GENERAL: healthy, alert and no distress  EYES: Eyes grossly normal to inspection, PERRL and conjunctivae and sclerae normal  HENT: ear canals and TM's normal, nose and mouth without ulcers or lesions  NECK: no adenopathy, no asymmetry, masses, or scars and thyroid normal to palpation  RESP: lungs clear to auscultation - no rales, rhonchi or wheezes  CV: regular rate and rhythm, normal S1 S2, no S3 or S4, no murmur, click or rub, no peripheral edema and peripheral pulses strong  ABDOMEN: bowel sounds active and pain in back . When pressing on her abdomen she is uncomfortable.  No sign of pain in her lower belly. Once I was done with exam was very uncomfortable.   MS: no gross musculoskeletal defects noted, no edema  SKIN: no suspicious lesions or rashes  NEURO: Normal strength and tone, mentation intact and speech normal  PSYCH: mentation appears normal, affect normal/bright    No results found for any visits on 09/27/23.

## 2023-09-28 ENCOUNTER — LAB (OUTPATIENT)
Dept: LAB | Facility: OTHER | Age: 63
End: 2023-09-28
Payer: COMMERCIAL

## 2023-09-28 DIAGNOSIS — R10.31 RLQ ABDOMINAL PAIN: ICD-10-CM

## 2023-09-28 LAB
ALBUMIN UR-MCNC: NEGATIVE MG/DL
APPEARANCE UR: CLEAR
BILIRUB UR QL STRIP: NEGATIVE
COLOR UR AUTO: NORMAL
GLUCOSE UR STRIP-MCNC: NEGATIVE MG/DL
HGB UR QL STRIP: NEGATIVE
KETONES UR STRIP-MCNC: NEGATIVE MG/DL
LEUKOCYTE ESTERASE UR QL STRIP: NEGATIVE
NITRATE UR QL: NEGATIVE
PH UR STRIP: 6 [PH] (ref 4.7–8)
RBC URINE: <1 /HPF
SP GR UR STRIP: 1 (ref 1–1.03)
SQUAMOUS EPITHELIAL: 0 /HPF
UROBILINOGEN UR STRIP-MCNC: NORMAL MG/DL
WBC URINE: 0 /HPF

## 2023-09-28 PROCEDURE — 81001 URINALYSIS AUTO W/SCOPE: CPT

## 2023-09-28 PROCEDURE — 87338 HPYLORI STOOL AG IA: CPT | Performed by: PHYSICIAN ASSISTANT

## 2023-09-29 ENCOUNTER — TELEPHONE (OUTPATIENT)
Dept: FAMILY MEDICINE | Facility: OTHER | Age: 63
End: 2023-09-29

## 2023-09-29 ENCOUNTER — HOSPITAL ENCOUNTER (OUTPATIENT)
Dept: CT IMAGING | Facility: HOSPITAL | Age: 63
Discharge: HOME | End: 2023-09-29
Attending: PHYSICIAN ASSISTANT | Admitting: PHYSICIAN ASSISTANT
Payer: COMMERCIAL

## 2023-09-29 DIAGNOSIS — R10.31 RLQ ABDOMINAL PAIN: ICD-10-CM

## 2023-09-29 DIAGNOSIS — R10.31 ABDOMINAL PAIN, RIGHT LOWER QUADRANT: Primary | ICD-10-CM

## 2023-09-29 DIAGNOSIS — K59.02 CONSTIPATION DUE TO OUTLET DYSFUNCTION: ICD-10-CM

## 2023-09-29 LAB — H PYLORI AG STL QL IA: NEGATIVE

## 2023-09-29 PROCEDURE — 250N000011 HC RX IP 250 OP 636: Performed by: STUDENT IN AN ORGANIZED HEALTH CARE EDUCATION/TRAINING PROGRAM

## 2023-09-29 PROCEDURE — 74177 CT ABD & PELVIS W/CONTRAST: CPT

## 2023-09-29 RX ORDER — IOPAMIDOL 755 MG/ML
70 INJECTION, SOLUTION INTRAVASCULAR ONCE
Status: COMPLETED | OUTPATIENT
Start: 2023-09-29 | End: 2023-09-29

## 2023-09-29 RX ORDER — IOPAMIDOL 755 MG/ML
17 INJECTION, SOLUTION INTRAVASCULAR ONCE
Status: COMPLETED | OUTPATIENT
Start: 2023-09-29 | End: 2023-09-29

## 2023-09-29 RX ADMIN — IOPAMIDOL 70 ML: 755 INJECTION, SOLUTION INTRAVENOUS at 08:07

## 2023-09-29 RX ADMIN — IOPAMIDOL 17 ML: 755 INJECTION, SOLUTION INTRAVENOUS at 08:07

## 2023-09-29 NOTE — TELEPHONE ENCOUNTER
1:57 PM    Reason for Call: Phone Call    Description: Had CT this morning. Patient interprets results on MyChart as an obstruction (fecalization). What should she eat this weekend? Patient worried she'll make it worse.     Was an appointment offered for this call? No    Preferred method for responding to this message: Telephone Call  What is your phone number [phone]    If we cannot reach you directly, may we leave a detailed response at the number you provided? Yes    Can this message wait until your PCP/provider returns, if available today? Not applicable    Rianna Villarreal

## 2023-09-29 NOTE — TELEPHONE ENCOUNTER
Spoke with Katlyn, referral to GI. Not sure why she has a bowel change and has constant pain.  Could have outlet obstruction between small and large bowel. But states no obstruction. No labs that are not in range.     Phone call on 9/29/610 p.m.

## 2023-11-22 ENCOUNTER — TRANSFERRED RECORDS (OUTPATIENT)
Dept: HEALTH INFORMATION MANAGEMENT | Facility: CLINIC | Age: 63
End: 2023-11-22

## 2023-12-18 DIAGNOSIS — B02.7 DISSEMINATED HERPES ZOSTER: ICD-10-CM

## 2023-12-18 NOTE — TELEPHONE ENCOUNTER
Valtrex      Last Written Prescription Date:  05/02/23  Last Fill Quantity: 21,   # refills: 0  Last Office Visit: 09/27/23  Future Office visit:

## 2023-12-20 RX ORDER — VALACYCLOVIR HYDROCHLORIDE 1 G/1
TABLET, FILM COATED ORAL
Qty: 21 TABLET | Refills: 0 | Status: SHIPPED | OUTPATIENT
Start: 2023-12-20

## 2024-06-15 ENCOUNTER — HEALTH MAINTENANCE LETTER (OUTPATIENT)
Age: 64
End: 2024-06-15

## 2024-07-05 ENCOUNTER — TELEPHONE (OUTPATIENT)
Dept: MAMMOGRAPHY | Facility: HOSPITAL | Age: 64
End: 2024-07-05

## 2024-07-05 ENCOUNTER — ANCILLARY PROCEDURE (OUTPATIENT)
Dept: MAMMOGRAPHY | Facility: OTHER | Age: 64
End: 2024-07-05
Attending: PHYSICIAN ASSISTANT
Payer: MEDICARE

## 2024-07-05 ENCOUNTER — OFFICE VISIT (OUTPATIENT)
Dept: FAMILY MEDICINE | Facility: OTHER | Age: 64
End: 2024-07-05
Attending: PHYSICIAN ASSISTANT
Payer: MEDICARE

## 2024-07-05 VITALS
TEMPERATURE: 97.3 F | DIASTOLIC BLOOD PRESSURE: 64 MMHG | HEIGHT: 63 IN | WEIGHT: 148.8 LBS | OXYGEN SATURATION: 98 % | HEART RATE: 66 BPM | BODY MASS INDEX: 26.36 KG/M2 | RESPIRATION RATE: 16 BRPM | SYSTOLIC BLOOD PRESSURE: 103 MMHG

## 2024-07-05 DIAGNOSIS — Z00.00 MEDICARE ANNUAL WELLNESS VISIT, INITIAL: Primary | ICD-10-CM

## 2024-07-05 DIAGNOSIS — Z11.59 ENCOUNTER FOR HEPATITIS C SCREENING TEST FOR LOW RISK PATIENT: ICD-10-CM

## 2024-07-05 DIAGNOSIS — Z11.4 ENCOUNTER FOR SCREENING FOR HIV: ICD-10-CM

## 2024-07-05 DIAGNOSIS — Z12.31 VISIT FOR SCREENING MAMMOGRAM: ICD-10-CM

## 2024-07-05 PROCEDURE — G0009 ADMIN PNEUMOCOCCAL VACCINE: HCPCS

## 2024-07-05 PROCEDURE — G0438 PPPS, INITIAL VISIT: HCPCS | Performed by: PHYSICIAN ASSISTANT

## 2024-07-05 PROCEDURE — 77063 BREAST TOMOSYNTHESIS BI: CPT | Mod: TC

## 2024-07-05 PROCEDURE — 93005 ELECTROCARDIOGRAM TRACING: CPT | Performed by: PHYSICIAN ASSISTANT

## 2024-07-05 PROCEDURE — 93010 ELECTROCARDIOGRAM REPORT: CPT | Performed by: INTERNAL MEDICINE

## 2024-07-05 SDOH — HEALTH STABILITY: PHYSICAL HEALTH: ON AVERAGE, HOW MANY DAYS PER WEEK DO YOU ENGAGE IN MODERATE TO STRENUOUS EXERCISE (LIKE A BRISK WALK)?: 5 DAYS

## 2024-07-05 ASSESSMENT — ANXIETY QUESTIONNAIRES
5. BEING SO RESTLESS THAT IT IS HARD TO SIT STILL: NOT AT ALL
3. WORRYING TOO MUCH ABOUT DIFFERENT THINGS: NOT AT ALL
2. NOT BEING ABLE TO STOP OR CONTROL WORRYING: NOT AT ALL
7. FEELING AFRAID AS IF SOMETHING AWFUL MIGHT HAPPEN: NOT AT ALL
1. FEELING NERVOUS, ANXIOUS, OR ON EDGE: NOT AT ALL
7. FEELING AFRAID AS IF SOMETHING AWFUL MIGHT HAPPEN: NOT AT ALL
GAD7 TOTAL SCORE: 0
6. BECOMING EASILY ANNOYED OR IRRITABLE: NOT AT ALL
GAD7 TOTAL SCORE: 0
4. TROUBLE RELAXING: NOT AT ALL
GAD7 TOTAL SCORE: 0

## 2024-07-05 ASSESSMENT — SOCIAL DETERMINANTS OF HEALTH (SDOH): HOW OFTEN DO YOU GET TOGETHER WITH FRIENDS OR RELATIVES?: MORE THAN THREE TIMES A WEEK

## 2024-07-05 NOTE — PROGRESS NOTES
"Preventive Care Visit  [facility]  MYRA Tsang, Family Medicine  Jul 5, 2024      Assessment & Plan     Medicare annual wellness visit, initial  She is going to check in to the colon screening. Has them done in [place].  She is feeling well. Reviewed immunizations and also reviewed testing screening. Not meeting requirement for Lung screening has been over 30 years and she feels well. We will also reviewed all immunizations.      Encounter for screening for HIV  Due for screening    - HIV Antigen Antibody Combo; Future  - Hepatitis C Screen Reflex to HCV RNA Quant and Genotype; Future    Encounter for hepatitis C screening test for low risk patient  Due for screening    - Hepatitis C Screen Reflex to HCV RNA Quant and Genotype; Future    Patient has been advised of split billing requirements and indicates understanding: No        BMI  Estimated body mass index is 26.36 kg/m  as calculated from the following:    Height as of this encounter: 1.6 m (5' 3\").    Weight as of this encounter: 67.5 kg (148 lb 12.8 oz).       Counseling  Appropriate preventive services were discussed with this patient, including applicable screening as appropriate for fall prevention, nutrition, physical activity, Tobacco-use cessation, weight loss and cognition.  Checklist reviewing preventive services available has been given to the patient.  Reviewed patient's diet, addressing concerns and/or questions.       See Patient Instructions    No follow-ups on file.    Halle Acharya is a 64 year old, presenting for the following:  Physical        7/5/2024     9:06 AM   Additional Questions   Roomed by alan robert   Accompanied by none         7/5/2024     9:06 AM   Patient Reported Additional Medications   Patient reports taking the following new medications none        Health Care Directive  Patient does not have a Health Care Directive or Living Will: Discussed advance care planning with patient; however, patient " declined at this time.    HPI            7/5/2024   General Health   How would you rate your overall physical health? Excellent   Feel stress (tense, anxious, or unable to sleep) Not at all            7/5/2024   Nutrition   Diet: Regular (no restrictions)            7/5/2024   Exercise   Days per week of moderate/strenous exercise 5 days            7/5/2024   Social Factors   Frequency of gathering with friends or relatives More than three times a week   Worry food won't last until get money to buy more No   Food not last or not have enough money for food? No   Do you have housing? (Housing is defined as stable permanent housing and does not include staying ouside in a car, in a tent, in an abandoned building, in an overnight shelter, or couch-surfing.) Yes   Are you worried about losing your housing? No   Lack of transportation? No   Unable to get utilities (heat,electricity)? No            7/5/2024   Fall Risk   Fallen 2 or more times in the past year? No    No   Trouble with walking or balance? No    No       Multiple values from one day are sorted in reverse-chronological order          7/5/2024   Activities of Daily Living- Home Safety   Needs help with the following daily activites None of the above   Safety concerns in the home None of the above            7/5/2024   Dental   Dentist two times every year? Yes            7/5/2024   Hearing Screening   Hearing concerns? None of the above            7/5/2024   Driving Risk Screening   Patient/family members have concerns about driving No            7/5/2024   General Alertness/Fatigue Screening   Have you been more tired than usual lately? No            7/5/2024   Urinary Incontinence Screening   Bothered by leaking urine in past 6 months No            7/5/2024   TB Screening   Were you born outside of the [place]? Yes          Today's PHQ-9 Score:       7/5/2024     8:52 AM   PHQ-9 SCORE   PHQ-9 Total Score MyChart 0   PHQ-9 Total Score 0         7/5/2024    Substance Use   Alcohol more than 3/day or more than 7/wk No   Do you have a current opioid prescription? No   How severe/bad is pain from 1 to 10? 0/10 (No Pain)   Do you use any other substances recreationally? No        Social History     Tobacco Use    Smoking status: Former     Types: Cigarettes    Smokeless tobacco: Never    Tobacco comments:     Tried to Quit (YES); YR QUIT (); Passive Exposure (NO)   Vaping Use    Vaping status: Never Used   Substance Use Topics    Alcohol use: Yes     Alcohol/week: 0.0 standard drinks of alcohol     Comment: RARELY    Drug use: No           3/18/2022   LAST FHS-7 RESULTS   1st degree relative breast or ovarian cancer No   Any relative bilateral breast cancer Unknown   Any male have breast cancer No   Any ONE woman have BOTH breast AND ovarian cancer No   Any woman with breast cancer before 50yrs Yes   2 or more relatives with breast AND/OR ovarian cancer Yes   2 or more relatives with breast AND/OR bowel cancer Yes           Mammogram Screening - Mammogram every 1-2 years updated in Health Maintenance based on mutual decision making          2024   One time HIV Screening   Previous HIV test? No        History of abnormal Pap smear: No - age 65 or older with adequate negative prior screening test results (3 consecutive negative cytology results, 2 consecutive negative cotesting results, or 2 consecutive negative HrHPV test results within 10 years, with the most recent test occurring within the recommended screening interval for the test used)       ASCVD Risk   The ASCVD Risk score (Wm VAZQUEZ, et al., 2019) failed to calculate for the following reasons:    Cannot find a previous HDL lab    Cannot find a previous total cholesterol lab    Fracture Risk Assessment Tool  Link to Frax Calculator  Use the information below to complete the Frax calculator  : 1960  Sex: female  Weight (kg): 67.5 kg (actual weight)  Height (cm): 160 cm  Previous Fragility  Fracture:  No  History of parent with fractured hip:  No  Current Smoking:  No  Patient has been on glucocorticoids for more than 3 months (5mg/day or more): No  Rheumatoid Arthritis on Problem List:  No  Secondary Osteoporosis on Problem List:  No  Consumes 3 or more units of alcohol per day: No  Femoral Neck BMD (g/cm2)            Reviewed and updated as needed this visit by Provider                    Past Medical History:   Diagnosis Date    Breast mass     left breast    Diverticulitis of colon (without mention of hemorrhage)(562.11) 2010    Endometriosis 2011    Fibrocystic change of Breast 2011    Gilbert Disease 2011    Herpes zoster without mention of complication 2010    Hormone replacement therapy (postmenopausal) 2010    Primary osteoarthritis of right shoulder 2016    Symptomatic states associated with artificial menopause 2010     Past Surgical History:   Procedure Laterality Date    BIOPSY      breast biopsy    BIOPSY BREAST Left 2007    lumpectomy [facility]    BIOPSY BREAST Left     benign    BIOPSY BREAST Left     bengn    CHOLECYSTECTOMY      COLONOSCOPY  2011    HYSTERECTOMY TOTAL ABDOMINAL, BILATERAL SALPINGO-OOPHORECTOMY, COMBINED N/A     vaginal vs abdominal hyst?    LEFT LUMPECTOMY      Benign    LUMPECTOMY BREAST Left     benign  [facility]     OB History    Para Term  AB Living   2 2 2 0 0 0   SAB IAB Ectopic Multiple Live Births   0 0 0 0 0      # Outcome Date GA Lbr Galo/2nd Weight Sex Type Anes PTL Lv   2 Term            1 Term              BP Readings from Last 3 Encounters:   24 103/64   23 117/69   23 110/60    Wt Readings from Last 3 Encounters:   24 67.5 kg (148 lb 12.8 oz)   23 65.3 kg (144 lb)   23 68 kg (150 lb)                  Patient Active Problem List   Diagnosis    ACP (advance care planning)    Primary osteoarthritis of right shoulder    Elevated rheumatoid factor     Vitamin D deficiency disease     Past Surgical History:   Procedure Laterality Date    BIOPSY      breast biopsy    BIOPSY BREAST Left 2007    lumpectomy [facility]    BIOPSY BREAST Left     benign    BIOPSY BREAST Left     bengn    CHOLECYSTECTOMY      COLONOSCOPY  02/17/2011    HYSTERECTOMY TOTAL ABDOMINAL, BILATERAL SALPINGO-OOPHORECTOMY, COMBINED N/A     vaginal vs abdominal hyst?    LEFT LUMPECTOMY      Benign    LUMPECTOMY BREAST Left 2007    benign  [facility]       Social History     Tobacco Use    Smoking status: Former     Types: Cigarettes    Smokeless tobacco: Never    Tobacco comments:     Tried to Quit (YES); YR QUIT (1980); Passive Exposure (NO)   Substance Use Topics    Alcohol use: Yes     Alcohol/week: 0.0 standard drinks of alcohol     Comment: RARELY     Family History   Problem Relation Age of Onset    Myocardial Infarction Mother     C.A.D. Mother     Heart Disease Father     Cancer Father         LUNG    C.A.D. Father     Cancer Brother         LUNG    Myocardial Infarction Other         MYOCARDIAL INFARCTION    Cancer Brother         TESTICULAR    Myocardial Infarction Other         MYOCARDIAL INFARCTION    Breast Cancer Paternal Cousin         40's    Breast Cancer Paternal Cousin     Breast Cancer Paternal Cousin          Current Outpatient Medications   Medication Sig Dispense Refill    Ascorbic Acid (MADIHA-C PO)       cetirizine (ZYRTEC) 10 MG tablet Take 10 mg by mouth daily as needed.      cyclobenzaprine (FLEXERIL) 10 MG tablet TAKE 1/2 TO 1 TABLET BY MOUTH 3 TIMES A DAY AS NEEDED FOR MUSCLE SPASMS 30 tablet 0    sennosides (SENOKOT) 8.6 MG tablet Take 1 tablet by mouth daily      triamcinolone (KENALOG) 0.1 % external cream Apply topically 2 times daily 160 g 1    valACYclovir (VALTREX) 1000 mg tablet TAKE 1 TABLET BY MOUTH 3 TIMES DAILY AS NEEDED 21 tablet 0    vitamin D3 (CHOLECALCIFEROL) 2000 units tablet Take 1 tablet by mouth daily       Allergies   Allergen Reactions    Codeine  Nausea    Calcium Rash     Recent Labs   Lab Test 09/27/23  1657 01/07/21  1325 04/04/19  0931 01/03/19  1452 02/17/18  1158 02/01/18  1421 10/27/16  1608   ALT 27 30  --   --  36  --  36   CR 0.71 0.64 0.77   < > 0.77   < > 0.73   GFRESTIMATED >90 >90 84   < > 77   < > 82   GFRESTBLACK  --  >90 >90   < > >90   < > >90  African American GFR Calc     POTASSIUM 5.2 3.7 3.7   < > 3.6   < > 4.0   TSH  --   --   --   --   --   --  1.37    < > = values in this interval not displayed.      Current providers sharing in care for this patient include:  Patient Care Team:  Toyin Puri PA as PCP - General  Toyin Puri PA as Assigned PCP    The following health maintenance items are reviewed in Epic and correct as of today:  Health Maintenance   Topic Date Due    IPV IMMUNIZATION (3 of 3 - 4-dose series) 04/08/1963    HIV SCREENING  Never done    HEPATITIS C SCREENING  Never done    DTAP/TDAP/TD IMMUNIZATION (5 - Tdap) 10/25/1991    ZOSTER IMMUNIZATION (1 of 2) Never done    LUNG CANCER SCREENING  Never done    LIPID  11/04/2018    RSV VACCINE (Pregnancy & 60+) (1 - 1-dose 60+ series) Never done    ADVANCE CARE PLANNING  04/06/2021    COVID-19 Vaccine (3 - 2023-24 season) 09/01/2023    MEDICARE ANNUAL WELLNESS VISIT  04/08/2024    Pneumococcal Vaccine: 65+ Years (2 of 2 - PPSV23 or PCV20) 04/08/2024    INFLUENZA VACCINE (1) 09/01/2024    MAMMO SCREENING  04/21/2025    LUIZA ASSESSMENT  07/05/2025    FALL RISK ASSESSMENT  07/05/2025    PHQ-9  07/05/2025    GLUCOSE  09/27/2026    COLORECTAL CANCER SCREENING  03/22/2031    DEXA  03/21/2037    PHQ-2 (once per calendar year)  Completed    HPV IMMUNIZATION  Aged Out    MENINGITIS IMMUNIZATION  Aged Out    RSV MONOCLONAL ANTIBODY  Aged Out         Review of Systems  Constitutional, HEENT, cardiovascular, pulmonary, GI, , musculoskeletal, neuro, skin, endocrine and psych systems are negative, except as otherwise noted.     Objective    Exam  /64 (BP Location: Left  "arm, Patient Position: Sitting, Cuff Size: Adult Regular)   Pulse 66   Temp 97.3  F (36.3  C) (Tympanic)   Resp 16   Ht 1.6 m (5' 3\")   Wt 67.5 kg (148 lb 12.8 oz)   SpO2 98%   BMI 26.36 kg/m     Estimated body mass index is 26.36 kg/m  as calculated from the following:    Height as of this encounter: 1.6 m (5' 3\").    Weight as of this encounter: 67.5 kg (148 lb 12.8 oz).    Physical Exam  GENERAL: alert and no distress  EYES: Eyes grossly normal to inspection, PERRL and conjunctivae and sclerae normal  HENT: ear canals and TM's normal, nose and mouth without ulcers or lesions  NECK: no adenopathy, no asymmetry, masses, or scars  RESP: lungs clear to auscultation - no rales, rhonchi or wheezes  BREAST: normal without masses, tenderness or nipple discharge and no palpable axillary masses or adenopathy  CV: regular rate and rhythm, normal S1 S2, no S3 or S4, no murmur, click or rub, no peripheral edema  ABDOMEN: soft, nontender, no hepatosplenomegaly, no masses and bowel sounds normal  MS: no gross musculoskeletal defects noted, no edema  SKIN: no suspicious lesions or rashes  NEURO: Normal strength and tone, mentation intact and speech normal  PSYCH: mentation appears normal, affect normal/bright  LYMPH: no cervical, supraclavicular, axillary, or inguinal adenopathy         7/5/2024   Mini Cog   Clock Draw Score 2 Normal   3 Item Recall 3 objects recalled   Mini Cog Total Score 5            Vision Screen  Reason Vision Screen Not Completed: Patient had exam in last 12 months        Signed Electronically by: MYRA Tsang    Answers submitted by the patient for this visit:  Patient Health Questionnaire (Submitted on 7/5/2024)  If you checked off any problems, how difficult have these problems made it for you to do your work, take care of things at home, or get along with other people?: Not difficult at all  PHQ9 TOTAL SCORE: 0  LUIZA-7 (Submitted on 7/5/2024)  LUIZA 7 TOTAL SCORE: 0    "

## 2024-07-08 LAB
ATRIAL RATE - MUSE: 67 BPM
DIASTOLIC BLOOD PRESSURE - MUSE: NORMAL MMHG
INTERPRETATION ECG - MUSE: NORMAL
P AXIS - MUSE: 51 DEGREES
PR INTERVAL - MUSE: 140 MS
QRS DURATION - MUSE: 88 MS
QT - MUSE: 430 MS
QTC - MUSE: 454 MS
R AXIS - MUSE: 28 DEGREES
SYSTOLIC BLOOD PRESSURE - MUSE: NORMAL MMHG
T AXIS - MUSE: 70 DEGREES
VENTRICULAR RATE- MUSE: 67 BPM

## 2024-07-09 ENCOUNTER — LAB (OUTPATIENT)
Dept: LAB | Facility: OTHER | Age: 64
End: 2024-07-09
Payer: MEDICARE

## 2024-07-09 DIAGNOSIS — Z11.59 ENCOUNTER FOR HEPATITIS C SCREENING TEST FOR LOW RISK PATIENT: ICD-10-CM

## 2024-07-09 DIAGNOSIS — Z11.4 ENCOUNTER FOR SCREENING FOR HIV: ICD-10-CM

## 2024-07-09 DIAGNOSIS — Z00.00 MEDICARE ANNUAL WELLNESS VISIT, INITIAL: ICD-10-CM

## 2024-07-09 LAB
ALBUMIN SERPL BCG-MCNC: 4 G/DL (ref 3.5–5.2)
ALP SERPL-CCNC: 90 U/L (ref 40–150)
ALT SERPL W P-5'-P-CCNC: 18 U/L (ref 0–50)
ANION GAP SERPL CALCULATED.3IONS-SCNC: 10 MMOL/L (ref 7–15)
AST SERPL W P-5'-P-CCNC: 21 U/L (ref 0–45)
BASOPHILS # BLD AUTO: 0 10E3/UL (ref 0–0.2)
BASOPHILS NFR BLD AUTO: 1 %
BILIRUB SERPL-MCNC: 1.3 MG/DL
BUN SERPL-MCNC: 16 MG/DL (ref 8–23)
CALCIUM SERPL-MCNC: 9.3 MG/DL (ref 8.8–10.2)
CHLORIDE SERPL-SCNC: 107 MMOL/L (ref 98–107)
CHOLEST SERPL-MCNC: 176 MG/DL
CREAT SERPL-MCNC: 0.83 MG/DL (ref 0.51–0.95)
DEPRECATED HCO3 PLAS-SCNC: 25 MMOL/L (ref 22–29)
EGFRCR SERPLBLD CKD-EPI 2021: 78 ML/MIN/1.73M2
EOSINOPHIL # BLD AUTO: 0.2 10E3/UL (ref 0–0.7)
EOSINOPHIL NFR BLD AUTO: 7 %
ERYTHROCYTE [DISTWIDTH] IN BLOOD BY AUTOMATED COUNT: 12.5 % (ref 10–15)
FASTING STATUS PATIENT QL REPORTED: YES
FASTING STATUS PATIENT QL REPORTED: YES
GLUCOSE SERPL-MCNC: 94 MG/DL (ref 70–99)
HCT VFR BLD AUTO: 41.9 % (ref 35–47)
HDLC SERPL-MCNC: 49 MG/DL
HGB BLD-MCNC: 14.1 G/DL (ref 11.7–15.7)
IMM GRANULOCYTES # BLD: 0 10E3/UL
IMM GRANULOCYTES NFR BLD: 0 %
LDLC SERPL CALC-MCNC: 112 MG/DL
LYMPHOCYTES # BLD AUTO: 1.2 10E3/UL (ref 0.8–5.3)
LYMPHOCYTES NFR BLD AUTO: 39 %
MCH RBC QN AUTO: 31.1 PG (ref 26.5–33)
MCHC RBC AUTO-ENTMCNC: 33.7 G/DL (ref 31.5–36.5)
MCV RBC AUTO: 93 FL (ref 78–100)
MONOCYTES # BLD AUTO: 0.3 10E3/UL (ref 0–1.3)
MONOCYTES NFR BLD AUTO: 10 %
NEUTROPHILS # BLD AUTO: 1.4 10E3/UL (ref 1.6–8.3)
NEUTROPHILS NFR BLD AUTO: 43 %
NONHDLC SERPL-MCNC: 127 MG/DL
NRBC # BLD AUTO: 0 10E3/UL
NRBC BLD AUTO-RTO: 0 /100
PLATELET # BLD AUTO: 164 10E3/UL (ref 150–450)
POTASSIUM SERPL-SCNC: 5.2 MMOL/L (ref 3.4–5.3)
PROT SERPL-MCNC: 6.6 G/DL (ref 6.4–8.3)
RBC # BLD AUTO: 4.53 10E6/UL (ref 3.8–5.2)
SODIUM SERPL-SCNC: 142 MMOL/L (ref 135–145)
TRIGL SERPL-MCNC: 76 MG/DL
TSH SERPL DL<=0.005 MIU/L-ACNC: 2 UIU/ML (ref 0.3–4.2)
WBC # BLD AUTO: 3.2 10E3/UL (ref 4–11)

## 2024-07-09 PROCEDURE — 84443 ASSAY THYROID STIM HORMONE: CPT | Mod: ZL,GZ

## 2024-07-09 PROCEDURE — 36415 COLL VENOUS BLD VENIPUNCTURE: CPT | Mod: ZL

## 2024-07-09 PROCEDURE — 87389 HIV-1 AG W/HIV-1&-2 AB AG IA: CPT | Mod: ZL

## 2024-07-09 PROCEDURE — 80061 LIPID PANEL: CPT | Mod: ZL

## 2024-07-09 PROCEDURE — 85025 COMPLETE CBC W/AUTO DIFF WBC: CPT | Mod: ZL,GZ

## 2024-07-09 PROCEDURE — 86803 HEPATITIS C AB TEST: CPT | Mod: ZL

## 2024-07-09 PROCEDURE — 80053 COMPREHEN METABOLIC PANEL: CPT | Mod: ZL

## 2024-07-10 LAB
HCV AB SERPL QL IA: NONREACTIVE
HIV 1+2 AB+HIV1 P24 AG SERPL QL IA: NONREACTIVE

## 2024-07-12 NOTE — RESULT ENCOUNTER NOTE
Patient notified of results. Seen by patient Patrizia Delcid on 7/12/2024  1:24 PM  Minerva Carbajal LPN

## 2024-07-24 ENCOUNTER — TELEPHONE (OUTPATIENT)
Dept: FAMILY MEDICINE | Facility: OTHER | Age: 64
End: 2024-07-24

## 2024-07-24 DIAGNOSIS — M25.50 MULTIPLE JOINT PAIN: Primary | ICD-10-CM

## 2024-07-24 NOTE — TELEPHONE ENCOUNTER
Patient would like to have a CRP and RH Factor lab done. Patient was seen on 07/05/2024. Do you want them to come in for another appointment or have a lab only?  Godfrey Carson LPN

## 2024-07-24 NOTE — TELEPHONE ENCOUNTER
8:29 AM    Reason for Call: Phone Call    Description: Patient called requesting to have CRP and RH factor labs done. Informed patient they need an appointment for labs to be ordered. Pt declined appointment, stating they just saw PCP. Please reach out to patient to discuss.    Was an appointment offered for this call? No  If yes : Appointment type              Date    Preferred method for responding to this message: Telephone Call  What is your phone number ?[phone]     If we cannot reach you directly, may we leave a detailed response at the number you provided? Yes    Can this message wait until your PCP/provider returns, if available today? Not applicable    Temitope Cunningham

## 2024-07-25 ENCOUNTER — TRANSFERRED RECORDS (OUTPATIENT)
Dept: HEALTH INFORMATION MANAGEMENT | Facility: CLINIC | Age: 64
End: 2024-07-25

## 2024-08-16 ENCOUNTER — HOSPITAL ENCOUNTER (OUTPATIENT)
Dept: CT IMAGING | Facility: HOSPITAL | Age: 64
Discharge: HOME | End: 2024-08-16
Attending: PHYSICIAN ASSISTANT
Payer: MEDICARE

## 2024-08-16 ENCOUNTER — TELEPHONE (OUTPATIENT)
Dept: FAMILY MEDICINE | Facility: OTHER | Age: 64
End: 2024-08-16

## 2024-08-16 ENCOUNTER — OFFICE VISIT (OUTPATIENT)
Dept: FAMILY MEDICINE | Facility: OTHER | Age: 64
End: 2024-08-16
Attending: PHYSICIAN ASSISTANT
Payer: MEDICARE

## 2024-08-16 VITALS
TEMPERATURE: 97.9 F | HEIGHT: 63 IN | DIASTOLIC BLOOD PRESSURE: 73 MMHG | SYSTOLIC BLOOD PRESSURE: 129 MMHG | WEIGHT: 147.1 LBS | OXYGEN SATURATION: 97 % | BODY MASS INDEX: 26.06 KG/M2 | HEART RATE: 76 BPM | RESPIRATION RATE: 16 BRPM

## 2024-08-16 DIAGNOSIS — M60.09 INFECTIVE MYOSITIS OF MULTIPLE SITES: Primary | ICD-10-CM

## 2024-08-16 DIAGNOSIS — R51.9 ACUTE NONINTRACTABLE HEADACHE, UNSPECIFIED HEADACHE TYPE: ICD-10-CM

## 2024-08-16 DIAGNOSIS — M60.09 INFECTIVE MYOSITIS OF MULTIPLE SITES: ICD-10-CM

## 2024-08-16 DIAGNOSIS — R53.1 WEAKNESS: ICD-10-CM

## 2024-08-16 LAB
ANION GAP SERPL CALCULATED.3IONS-SCNC: 9 MMOL/L (ref 7–15)
BASOPHILS # BLD AUTO: 0 10E3/UL (ref 0–0.2)
BASOPHILS NFR BLD AUTO: 1 %
BUN SERPL-MCNC: 15.8 MG/DL (ref 8–23)
CALCIUM SERPL-MCNC: 9.7 MG/DL (ref 8.8–10.4)
CHLORIDE SERPL-SCNC: 102 MMOL/L (ref 98–107)
CREAT SERPL-MCNC: 0.82 MG/DL (ref 0.51–0.95)
CRP SERPL-MCNC: <3 MG/L
EGFRCR SERPLBLD CKD-EPI 2021: 79 ML/MIN/1.73M2
EOSINOPHIL # BLD AUTO: 0.1 10E3/UL (ref 0–0.7)
EOSINOPHIL NFR BLD AUTO: 2 %
ERYTHROCYTE [DISTWIDTH] IN BLOOD BY AUTOMATED COUNT: 13.1 % (ref 10–15)
ERYTHROCYTE [SEDIMENTATION RATE] IN BLOOD BY WESTERGREN METHOD: 7 MM/HR (ref 0–30)
FLUAV RNA SPEC QL NAA+PROBE: NEGATIVE
FLUBV RNA RESP QL NAA+PROBE: NEGATIVE
GLUCOSE SERPL-MCNC: 96 MG/DL (ref 70–99)
HCO3 SERPL-SCNC: 28 MMOL/L (ref 22–29)
HCT VFR BLD AUTO: 44.3 % (ref 35–47)
HGB BLD-MCNC: 14.7 G/DL (ref 11.7–15.7)
IMM GRANULOCYTES # BLD: 0 10E3/UL
IMM GRANULOCYTES NFR BLD: 0 %
LYMPHOCYTES # BLD AUTO: 1.1 10E3/UL (ref 0.8–5.3)
LYMPHOCYTES NFR BLD AUTO: 23 %
MCH RBC QN AUTO: 31.2 PG (ref 26.5–33)
MCHC RBC AUTO-ENTMCNC: 33.2 G/DL (ref 31.5–36.5)
MCV RBC AUTO: 94 FL (ref 78–100)
MONOCYTES # BLD AUTO: 0.3 10E3/UL (ref 0–1.3)
MONOCYTES NFR BLD AUTO: 7 %
NEUTROPHILS # BLD AUTO: 3.2 10E3/UL (ref 1.6–8.3)
NEUTROPHILS NFR BLD AUTO: 67 %
NRBC # BLD AUTO: 0 10E3/UL
NRBC BLD AUTO-RTO: 0 /100
PLATELET # BLD AUTO: 161 10E3/UL (ref 150–450)
POTASSIUM SERPL-SCNC: 4.1 MMOL/L (ref 3.4–5.3)
RBC # BLD AUTO: 4.71 10E6/UL (ref 3.8–5.2)
RSV RNA SPEC NAA+PROBE: NEGATIVE
SARS-COV-2 RNA RESP QL NAA+PROBE: NEGATIVE
SODIUM SERPL-SCNC: 139 MMOL/L (ref 135–145)
WBC # BLD AUTO: 4.7 10E3/UL (ref 4–11)

## 2024-08-16 PROCEDURE — 87798 DETECT AGENT NOS DNA AMP: CPT | Mod: ZL,91 | Performed by: PHYSICIAN ASSISTANT

## 2024-08-16 PROCEDURE — 36415 COLL VENOUS BLD VENIPUNCTURE: CPT | Mod: ZL | Performed by: PHYSICIAN ASSISTANT

## 2024-08-16 PROCEDURE — G0463 HOSPITAL OUTPT CLINIC VISIT: HCPCS | Mod: 25

## 2024-08-16 PROCEDURE — 87637 SARSCOV2&INF A&B&RSV AMP PRB: CPT | Mod: ZL | Performed by: PHYSICIAN ASSISTANT

## 2024-08-16 PROCEDURE — 86140 C-REACTIVE PROTEIN: CPT | Mod: ZL | Performed by: PHYSICIAN ASSISTANT

## 2024-08-16 PROCEDURE — 85652 RBC SED RATE AUTOMATED: CPT | Mod: ZL | Performed by: PHYSICIAN ASSISTANT

## 2024-08-16 PROCEDURE — 250N000011 HC RX IP 250 OP 636: Performed by: RADIOLOGY

## 2024-08-16 PROCEDURE — 80048 BASIC METABOLIC PNL TOTAL CA: CPT | Mod: ZL | Performed by: PHYSICIAN ASSISTANT

## 2024-08-16 PROCEDURE — 85004 AUTOMATED DIFF WBC COUNT: CPT | Mod: ZL | Performed by: PHYSICIAN ASSISTANT

## 2024-08-16 PROCEDURE — 99214 OFFICE O/P EST MOD 30 MIN: CPT | Performed by: PHYSICIAN ASSISTANT

## 2024-08-16 PROCEDURE — 70470 CT HEAD/BRAIN W/O & W/DYE: CPT | Mod: MA

## 2024-08-16 PROCEDURE — G0463 HOSPITAL OUTPT CLINIC VISIT: HCPCS

## 2024-08-16 RX ORDER — RIZATRIPTAN BENZOATE 10 MG/1
10 TABLET, ORALLY DISINTEGRATING ORAL
Qty: 9 TABLET | Refills: 1 | Status: SHIPPED | OUTPATIENT
Start: 2024-08-16

## 2024-08-16 RX ORDER — IOPAMIDOL 755 MG/ML
50 INJECTION, SOLUTION INTRAVASCULAR ONCE
Status: COMPLETED | OUTPATIENT
Start: 2024-08-16 | End: 2024-08-16

## 2024-08-16 RX ORDER — AMOXICILLIN 875 MG
875 TABLET ORAL 2 TIMES DAILY
Qty: 20 TABLET | Refills: 1 | Status: SHIPPED | OUTPATIENT
Start: 2024-08-16

## 2024-08-16 RX ADMIN — IOPAMIDOL 50 ML: 755 INJECTION, SOLUTION INTRAVENOUS at 14:21

## 2024-08-16 NOTE — PROGRESS NOTES
Assessment & Plan     Infective myositis of multiple sites  She is miserable. She will have all testing. Her CT was not diagnostic.  We will see if she has anything going on.   - ESR: Erythrocyte sedimentation rate; Future  - CBC with platelets and differential; Future  - Tick-Borne Disease Panel by PCR; Future  - Basic metabolic panel; Future  - CRP, inflammation; Future  - CT Head w/o & w Contrast; Future  - ESR: Erythrocyte sedimentation rate  - CBC with platelets and differential  - Tick-Borne Disease Panel by PCR  - Basic metabolic panel  - CRP, inflammation  - Asymptomatic Influenza A/B, RSV, & SARS-CoV2 PCR (COVID-19) Nasopharyngeal; Future  - Asymptomatic Influenza A/B, RSV, & SARS-CoV2 PCR (COVID-19) Nasopharyngeal    Acute nonintractable headache, unspecified headache type  Hx of Migraines years ago but not had them for so many years. We will try Maxalt and also treat for sinus and has some pain over there right maxillary area.   - ESR: Erythrocyte sedimentation rate; Future  - CBC with platelets and differential; Future  - Tick-Borne Disease Panel by PCR; Future  - Basic metabolic panel; Future  - CT Head w/o & w Contrast; Future  - ESR: Erythrocyte sedimentation rate  - CBC with platelets and differential  - Tick-Borne Disease Panel by PCR  - Basic metabolic panel  - Asymptomatic Influenza A/B, RSV, & SARS-CoV2 PCR (COVID-19) Nasopharyngeal; Future  - Asymptomatic Influenza A/B, RSV, & SARS-CoV2 PCR (COVID-19) Nasopharyngeal  - amoxicillin (AMOXIL) 875 MG tablet; Take 1 tablet (875 mg) by mouth 2 times daily  - rizatriptan (MAXALT-MLT) 10 MG ODT; Take 1 tablet (10 mg) by mouth at onset of headache for migraine May repeat in 2 hours. Max 3 tablets/24 hours.    Weakness  No sign fo flu or covid. See if tick.   - Asymptomatic Influenza A/B, RSV, & SARS-CoV2 PCR (COVID-19) Nasopharyngeal; Future  - Asymptomatic Influenza A/B, RSV, & SARS-CoV2 PCR (COVID-19) Nasopharyngeal            See Patient  Instructions    No follow-ups on file.    Subjective   Patrizia is a 64 year old, presenting for the following health issues:  Headache        8/16/2024    12:54 PM   Additional Questions   Roomed by alan jaramillo   Accompanied by none         8/16/2024    12:54 PM   Patient Reported Additional Medications   Patient reports taking the following new medications none     History of Present Illness       Reason for visit:  Not feeling well    She eats 2-3 servings of fruits and vegetables daily.She consumes 0 sweetened beverage(s) daily.She exercises with enough effort to increase her heart rate 60 or more minutes per day.  She exercises with enough effort to increase her heart rate 3 or less days per week.   She is taking medications regularly.       Headaches    Duration: 8/12  Description  Location: back of head by neck and right eye   Character: throbbing pain  Frequency:  constant since 8/12  Duration:  N/A  Intensity:  moderate  Accompanying signs and symptoms:  Precipitating or Alleviating factors:  Nausea/vomiting: always  Dizziness: no  Weakness or numbness: no  Visual changes: wavy/zigzag lines  Fever: no   Sinus or URI symptoms no   History  Head trauma: no   Family history of migraines: YES  Previous tests for headaches: YES- believes had CT  Neurologist evaluations: no   Able to do daily activities when headache present: YES  Wake with headaches: YES  Daily pain medication use: YES- tylenol, Excedrin migraine, tylenol arthritis, benadryl  Any changes in: none  Precipitating or Alleviating factors (light/sound/sleep/caffeine): darkness makes it better  Therapies tried and outcome: Tylenol and Excedrin    Outcome - takes edge off  Frequent/daily pain medication use: YES         Review of Systems  Constitutional, HEENT, cardiovascular, pulmonary, gi and gu systems are negative, except as otherwise noted.      Objective    /73 (BP Location: Left arm, Patient Position: Sitting, Cuff Size: Adult Regular)    "Pulse 76   Temp 97.9  F (36.6  C) (Tympanic)   Resp 16   Ht 1.6 m (5' 3\")   Wt 66.7 kg (147 lb 1.6 oz)   SpO2 97%   BMI 26.06 kg/m    Body mass index is 26.06 kg/m .  Physical Exam   GENERAL: alert and no distress  EYES: Eyes grossly normal to inspection, PERRL and conjunctivae and sclerae normal  HENT: ear canals and TM's normal, nose and mouth without ulcers or lesions  NECK: no adenopathy, no asymmetry, masses, or scars  RESP: lungs clear to auscultation - no rales, rhonchi or wheezes  CV: regular rate and rhythm, normal S1 S2, no S3 or S4, no murmur, click or rub, no peripheral edema  ABDOMEN: soft, nontender, no hepatosplenomegaly, no masses and bowel sounds normal  MS: no gross musculoskeletal defects noted, no edema    Results for orders placed or performed during the hospital encounter of 08/16/24   CT Head w/o & w Contrast     Status: None    Narrative    PROCEDURE: CT HEAD W/O & W CONTRAST   8/16/2024 2:35 PM    HISTORY:Female, age,  64 years, , , Infective myositis of multiple  sites; Acute Nonintractable headache, unspecified headache type    COMPARISON:No relevant prior imaging.    TECHNIQUE: CT of the brain without contrast. Axial; sagittal and  coronal reconstructed images were reviewed.  This facility minimizes radiation dose by adjusting the mA and/or kV  according to each patient size.  This CT scan was performed using one or more the following dose  reduction techniques:  -Automated exposure control,  -Adjustment of the mA and/or kV according to patient's size, and/or,  -Use of iterative reconstruction technique.      FINDINGS: Ventricles and sulci are normal in size and shape. Gray and  white matter demonstrate scattered areas of decreased density.    There is no evidence of mass, mass effect or midline shift. No  evidence of acute hemorrhage. No acute fracture.         Impression    IMPRESSION:   No acute intracranial abnormality.  No acute fracture.     Nonspecific white matter changes " suggesting small vessel disease. If  clinical concern persists, MRI evaluation without and with contrast  may be helpful.        MISSY CLAUDIO MD         SYSTEM ID:  [id number]   Results for orders placed or performed in visit on 08/16/24   ESR: Erythrocyte sedimentation rate     Status: Normal   Result Value Ref Range    Erythrocyte Sedimentation Rate 7 0 - 30 mm/hr   Basic metabolic panel     Status: Normal   Result Value Ref Range    Sodium 139 135 - 145 mmol/L    Potassium 4.1 3.4 - 5.3 mmol/L    Chloride 102 98 - 107 mmol/L    Carbon Dioxide (CO2) 28 22 - 29 mmol/L    Anion Gap 9 7 - 15 mmol/L    Urea Nitrogen 15.8 8.0 - 23.0 mg/dL    Creatinine 0.82 0.51 - 0.95 mg/dL    GFR Estimate 79 >60 mL/min/1.73m2    Calcium 9.7 8.8 - 10.4 mg/dL    Glucose 96 70 - 99 mg/dL   CRP, inflammation     Status: Normal   Result Value Ref Range    CRP Inflammation <3.00 <5.00 mg/L   CBC with platelets and differential     Status: None   Result Value Ref Range    WBC Count 4.7 4.0 - 11.0 10e3/uL    RBC Count 4.71 3.80 - 5.20 10e6/uL    Hemoglobin 14.7 11.7 - 15.7 g/dL    Hematocrit 44.3 35.0 - 47.0 %    MCV 94 78 - 100 fL    MCH 31.2 26.5 - 33.0 pg    MCHC 33.2 31.5 - 36.5 g/dL    RDW 13.1 10.0 - 15.0 %    Platelet Count 161 150 - 450 10e3/uL    % Neutrophils 67 %    % Lymphocytes 23 %    % Monocytes 7 %    % Eosinophils 2 %    % Basophils 1 %    % Immature Granulocytes 0 %    NRBCs per 100 WBC 0 <1 /100    Absolute Neutrophils 3.2 1.6 - 8.3 10e3/uL    Absolute Lymphocytes 1.1 0.8 - 5.3 10e3/uL    Absolute Monocytes 0.3 0.0 - 1.3 10e3/uL    Absolute Eosinophils 0.1 0.0 - 0.7 10e3/uL    Absolute Basophils 0.0 0.0 - 0.2 10e3/uL    Absolute Immature Granulocytes 0.0 <=0.4 10e3/uL    Absolute NRBCs 0.0 10e3/uL   Asymptomatic Influenza A/B, RSV, & SARS-CoV2 PCR (COVID-19) Nasopharyngeal     Status: Normal    Specimen: Nasopharyngeal; Swab   Result Value Ref Range    Influenza A PCR Negative Negative    Influenza B PCR Negative  Negative    RSV PCR Negative Negative    SARS CoV2 PCR Negative Negative    Narrative    Testing was performed using the Xpert Xpress CoV2/Flu/RSV Assay on the [employer] GeneXpert Instrument. This test should be ordered for the detection of SARS-CoV-2, influenza, and RSV viruses in individuals who meet clinical and/or epidemiological criteria. Test performance is unknown in asymptomatic patients. This test is for in vitro diagnostic use under the FDA EUA for laboratories certified under CLIA to perform high or moderate complexity testing. This test has not been FDA cleared or approved. A negative result does not rule out the presence of PCR inhibitors in the specimen or target RNA in concentration below the limit of detection for the assay. If only one viral target is positive but coinfection with multiple targets is suspected, the sample should be re-tested with another FDA cleared, approved, or authorized test, if coinfection would change clinical management. This test was validated by the [facility] [employer]. These laboratories are certified under the Clinical Laboratory Improvement Amendments of 1988 (CLIA-88) as qualified to perform high complexity laboratory testing.   CBC with platelets and differential     Status: None    Narrative    The following orders were created for panel order CBC with platelets and differential.  Procedure                               Abnormality         Status                     ---------                               -----------         ------                     CBC with platelets and d...[[id number]]                      Final result                 Please view results for these tests on the individual orders.           Signed Electronically by: MYRA Tsang

## 2024-08-16 NOTE — TELEPHONE ENCOUNTER
"8/16/2024 9:14 AM  Pt left VM regarding autoimmune flare up and reported HA.    Writer called pt. Pt stated \"yes, they think possible autoimmune flare up\". Pt reports trying tylenol and IBU. Pt reports COHEN as moderate. Pt denies any other sx or concerns. Pt reports past hx of migraines.     Per jen ok to overbook at 1 PM today.     Candy Lanza RN    Future Appointments 8/16/2024 - 2/12/2025        Date Visit Type Length Department Provider     8/16/2024  1:00 PM OFFICE VISIT 30 min [facility] Toyin Puri, PA    Location Instructions:     From [place] Area: Take [address]. Turn left at [address]. Turn left at the first stoplight on [address]. At the first stop sign, take a right onto [address]. The upper level parking lot will be on the left. East Entrance Door number 10.   From [place]: Take [address]. Take a right at [address]. At the first stop sign, take a right onto [address]. The upper level parking lot will be on the left. East Entrance Door number 10.   From [place]: Take [address]. Take the [address] ramp towards [place]. Turn left onto [address]. Take a slight right onto [address]. Turn left at the first stoplight on [address]. At the first stop sign, take a right onto [address]. The upper level parking lot will be on the left. East Entrance Door number 10.                    "

## 2024-08-16 NOTE — TELEPHONE ENCOUNTER
Symptom or reason needing to speak to RN: Horrible headache.  Her whole body isn't feeling good she has had this before and thinks is it an autoimmune flare up her whole body aches wants to be seen today     Best number to return call: [phone] or [phone]     Best time to return call: Anytime

## 2024-08-18 LAB
A PHAGOCYTOPH DNA BLD QL NAA+PROBE: NOT DETECTED
BABESIA DNA BLD QL NAA+PROBE: NOT DETECTED
EHRLICHIA DNA SPEC QL NAA+PROBE: NOT DETECTED

## 2024-09-23 ENCOUNTER — OFFICE VISIT (OUTPATIENT)
Dept: FAMILY MEDICINE | Facility: OTHER | Age: 64
End: 2024-09-23
Attending: PHYSICIAN ASSISTANT
Payer: COMMERCIAL

## 2024-09-23 VITALS
WEIGHT: 144.5 LBS | HEART RATE: 75 BPM | BODY MASS INDEX: 25.6 KG/M2 | DIASTOLIC BLOOD PRESSURE: 60 MMHG | TEMPERATURE: 98.3 F | RESPIRATION RATE: 16 BRPM | OXYGEN SATURATION: 98 % | SYSTOLIC BLOOD PRESSURE: 100 MMHG

## 2024-09-23 DIAGNOSIS — R76.8 ELEVATED RHEUMATOID FACTOR: ICD-10-CM

## 2024-09-23 DIAGNOSIS — M60.09 INFECTIVE MYOSITIS OF MULTIPLE SITES: Primary | ICD-10-CM

## 2024-09-23 PROCEDURE — G0463 HOSPITAL OUTPT CLINIC VISIT: HCPCS

## 2024-09-23 PROCEDURE — 99213 OFFICE O/P EST LOW 20 MIN: CPT | Performed by: PHYSICIAN ASSISTANT

## 2024-09-23 ASSESSMENT — PAIN SCALES - GENERAL: PAINLEVEL: NO PAIN (0)

## 2024-09-23 NOTE — PROGRESS NOTES
Assessment & Plan     Infective myositis of multiple sites  6 weeks later she is fine. No further headache and no further myositis.    She seems to get this every year.  Affects different part of her body.               See Patient Instructions    No follow-ups on file.    Halle Acharya is a 64 year old, presenting for the following health issues:  Follow Up        9/23/2024    12:54 PM   Additional Questions   Roomed by Godfrey Carson lpn   Accompanied by self     HPI       Headaches     Duration: 8/12  Description  Location: back of head by neck and right eye   Character: throbbing pain  Frequency:  constant since 8/12  Duration:  N/A  Intensity:  moderate  Accompanying signs and symptoms:  Precipitating or Alleviating factors:  Nausea/vomiting: always nauseous   Dizziness: no  Weakness or numbness: no  Visual changes: wavy/zigzag lines  Fever: no   Sinus or URI symptoms no   History  Head trauma: no   Family history of migraines: YES  Previous tests for headaches: YES- believes had CT  Neurologist evaluations: no   Able to do daily activities when headache present: YES  Wake with headaches: YES  Daily pain medication use: YES- tylenol, Excedrin migraine, tylenol arthritis, benadryl  Any changes in: none  Precipitating or Alleviating factors (light/sound/sleep/caffeine): darkness makes it better  Therapies tried and outcome: Tylenol and Excedrin    Outcome - takes edge off  Frequent/daily pain medication use: YES             Review of Systems  Constitutional, HEENT, cardiovascular, pulmonary, gi and gu systems are negative, except as otherwise noted.      Objective    /60 (BP Location: Left arm, Patient Position: Sitting, Cuff Size: Adult Regular)   Pulse 75   Temp 98.3  F (36.8  C) (Tympanic)   Resp 16   Wt 65.5 kg (144 lb 8 oz)   SpO2 98%   BMI 25.60 kg/m    Body mass index is 25.6 kg/m .  Physical Exam   GENERAL: alert and no distress  NECK: no adenopathy, no asymmetry, masses, or  scars  RESP: lungs clear to auscultation - no rales, rhonchi or wheezes  CV: regular rate and rhythm, normal S1 S2, no S3 or S4, no murmur, click or rub, no peripheral edema  ABDOMEN: soft, nontender, no hepatosplenomegaly, no masses and bowel sounds normal  MS: no gross musculoskeletal defects noted, no edema  PSYCH: mentation appears normal, affect normal/bright    Office Visit on 08/16/2024   Component Date Value Ref Range Status    Erythrocyte Sedimentation Rate 08/16/2024 7  0 - 30 mm/hr Final    Anaplasma phagocytophilum by PCR 08/16/2024 Not Detected  Not Detected Final    Babesia species by PCR 08/16/2024 Not Detected  Not Detected Final    Ehrlichia species by PCR 08/16/2024 Not Detected  Not Detected Final    Sodium 08/16/2024 139  135 - 145 mmol/L Final    Potassium 08/16/2024 4.1  3.4 - 5.3 mmol/L Final    Chloride 08/16/2024 102  98 - 107 mmol/L Final    Carbon Dioxide (CO2) 08/16/2024 28  22 - 29 mmol/L Final    Anion Gap 08/16/2024 9  7 - 15 mmol/L Final    Urea Nitrogen 08/16/2024 15.8  8.0 - 23.0 mg/dL Final    Creatinine 08/16/2024 0.82  0.51 - 0.95 mg/dL Final    GFR Estimate 08/16/2024 79  >60 mL/min/1.73m2 Final    eGFR calculated using 2021 CKD-EPI equation.    Calcium 08/16/2024 9.7  8.8 - 10.4 mg/dL Final    Reference intervals for this test were updated on 7/16/2024 to reflect our healthy population more accurately. There may be differences in the flagging of prior results with similar values performed with this method. Those prior results can be interpreted in the context of the updated reference intervals.    Glucose 08/16/2024 96  70 - 99 mg/dL Final    CRP Inflammation 08/16/2024 <3.00  <5.00 mg/L Final    WBC Count 08/16/2024 4.7  4.0 - 11.0 10e3/uL Final    RBC Count 08/16/2024 4.71  3.80 - 5.20 10e6/uL Final    Hemoglobin 08/16/2024 14.7  11.7 - 15.7 g/dL Final    Hematocrit 08/16/2024 44.3  35.0 - 47.0 % Final    MCV 08/16/2024 94  78 - 100 fL Final    MCH 08/16/2024 31.2  26.5 -  33.0 pg Final    MCHC 08/16/2024 33.2  31.5 - 36.5 g/dL Final    RDW 08/16/2024 13.1  10.0 - 15.0 % Final    Platelet Count 08/16/2024 161  150 - 450 10e3/uL Final    % Neutrophils 08/16/2024 67  % Final    % Lymphocytes 08/16/2024 23  % Final    % Monocytes 08/16/2024 7  % Final    % Eosinophils 08/16/2024 2  % Final    % Basophils 08/16/2024 1  % Final    % Immature Granulocytes 08/16/2024 0  % Final    NRBCs per 100 WBC 08/16/2024 0  <1 /100 Final    Absolute Neutrophils 08/16/2024 3.2  1.6 - 8.3 10e3/uL Final    Absolute Lymphocytes 08/16/2024 1.1  0.8 - 5.3 10e3/uL Final    Absolute Monocytes 08/16/2024 0.3  0.0 - 1.3 10e3/uL Final    Absolute Eosinophils 08/16/2024 0.1  0.0 - 0.7 10e3/uL Final    Absolute Basophils 08/16/2024 0.0  0.0 - 0.2 10e3/uL Final    Absolute Immature Granulocytes 08/16/2024 0.0  <=0.4 10e3/uL Final    Absolute NRBCs 08/16/2024 0.0  10e3/uL Final    Influenza A PCR 08/16/2024 Negative  Negative Final    Influenza B PCR 08/16/2024 Negative  Negative Final    RSV PCR 08/16/2024 Negative  Negative Final    SARS CoV2 PCR 08/16/2024 Negative  Negative Final    NEGATIVE: SARS-CoV-2 (COVID-19) RNA not detected, presumed negative.           Signed Electronically by: MYRA Tsang

## 2024-10-16 ENCOUNTER — MYC REFILL (OUTPATIENT)
Dept: FAMILY MEDICINE | Facility: OTHER | Age: 64
End: 2024-10-16

## 2024-10-16 DIAGNOSIS — B02.7 DISSEMINATED HERPES ZOSTER: ICD-10-CM

## 2024-10-16 RX ORDER — VALACYCLOVIR HYDROCHLORIDE 1 G/1
TABLET, FILM COATED ORAL
Qty: 21 TABLET | Refills: 0 | Status: SHIPPED | OUTPATIENT
Start: 2024-10-16

## 2024-12-24 ENCOUNTER — HOSPITAL ENCOUNTER (EMERGENCY)
Facility: HOSPITAL | Age: 64
Discharge: HOME | End: 2024-12-24
Attending: NURSE PRACTITIONER | Admitting: NURSE PRACTITIONER
Payer: MEDICARE

## 2024-12-24 ENCOUNTER — APPOINTMENT (OUTPATIENT)
Dept: GENERAL RADIOLOGY | Facility: HOSPITAL | Age: 64
End: 2024-12-24
Attending: NURSE PRACTITIONER
Payer: MEDICARE

## 2024-12-24 VITALS
DIASTOLIC BLOOD PRESSURE: 68 MMHG | OXYGEN SATURATION: 97 % | WEIGHT: 146 LBS | SYSTOLIC BLOOD PRESSURE: 109 MMHG | TEMPERATURE: 98 F | RESPIRATION RATE: 18 BRPM | HEART RATE: 90 BPM | BODY MASS INDEX: 25.86 KG/M2

## 2024-12-24 DIAGNOSIS — R05.1 ACUTE COUGH: Primary | ICD-10-CM

## 2024-12-24 PROCEDURE — 99213 OFFICE O/P EST LOW 20 MIN: CPT | Performed by: NURSE PRACTITIONER

## 2024-12-24 PROCEDURE — G0463 HOSPITAL OUTPT CLINIC VISIT: HCPCS | Mod: 25

## 2024-12-24 PROCEDURE — 71046 X-RAY EXAM CHEST 2 VIEWS: CPT

## 2024-12-24 ASSESSMENT — ENCOUNTER SYMPTOMS
SHORTNESS OF BREATH: 0
MYALGIAS: 1
COUGH: 1
CHILLS: 0
FEVER: 0
BACK PAIN: 1

## 2024-12-24 ASSESSMENT — ACTIVITIES OF DAILY LIVING (ADL): ADLS_ACUITY_SCORE: 41

## 2024-12-24 NOTE — DISCHARGE INSTRUCTIONS
Your lungs sound clear today.  Additionally, your chest x-ray did not show any pneumonia.  Virus is likely causing a cough.  Continue with Mucinex, Tylenol or ibuprofen as needed for the pain.    Follow-up with your doctor next week if no improvement in symptoms.  Return to urgent care or Emergency Department for any worsening or concerning symptoms.

## 2024-12-24 NOTE — ED PROVIDER NOTES
History     Chief Complaint   Patient presents with    Cough     HPI  Patrizia Delcid is a 64 year old female who presents to urgent care for evaluation of URI symptoms that started 1 week ago.  She has dry cough, chest congestion and sinus drainage.  She is now starting to get upper back pain.  No fevers or chills.  No chest pain or shortness of breath.  Eating and drinking okay.  She has been taking Mucinex and Tylenol.  Took care of her grandchild a few days ago that had URI symptoms.  Negative home COVID-19 test.  Has not smoked cigarettes for over 30 years.  No history of asthma or COPD.    Allergies:  Allergies   Allergen Reactions    Codeine Nausea    Calcium Rash       Problem List:    Patient Active Problem List    Diagnosis Date Noted    Elevated rheumatoid factor 11/13/2017     Priority: Medium    Vitamin D deficiency disease 11/13/2017     Priority: Medium    Primary osteoarthritis of right shoulder 09/07/2016     Priority: Medium    ACP (advance care planning) 04/06/2016     Priority: Medium     Advance Care Planning 4/6/2016: ACP Review of Chart / Resources Provided:  Reviewed chart for advance care plan.  Patrizia Delcid has been provided information and resources to begin or update their advance care plan.  Added by Maritza Manuel                Past Medical History:    Past Medical History:   Diagnosis Date    Breast mass 2008    Diverticulitis of colon (without mention of hemorrhage)(562.11) 12/08/2010    Endometriosis 09/12/2011    Fibrocystic change of Breast 09/12/2011    Gilbert Disease 09/12/2011    Herpes zoster without mention of complication 12/08/2010    Hormone replacement therapy (postmenopausal) 12/08/2010    Primary osteoarthritis of right shoulder 9/7/2016    Symptomatic states associated with artificial menopause 12/08/2010       Past Surgical History:    Past Surgical History:   Procedure Laterality Date    BIOPSY      breast biopsy    BI[facility] BREAST Left [facility]    BIOPSY BREAST Left     benign    BIOPSY BREAST Left     bengn    CHOLECYSTECTOMY      COLONOSCOPY  02/17/2011    HYSTERECTOMY TOTAL ABDOMINAL, BILATERAL SALPINGO-OOPHORECTOMY, COMBINED N/A     vaginal vs abdominal hyst?    LEFT LUMPECTOMY      Benign    LUMPECTOMY BREAST Left 2007    benign  [facility]       Family History:    Family History   Problem Relation Age of Onset    Myocardial Infarction Mother     C.A.D. Mother     Heart Disease Father     Cancer Father         LUNG    C.A.D. Father     Cancer Brother         LUNG    Myocardial Infarction Other         MYOCARDIAL INFARCTION    Cancer Brother         TESTICULAR    Myocardial Infarction Other         MYOCARDIAL INFARCTION    Breast Cancer Paternal Cousin         40's    Breast Cancer Paternal Cousin     Breast Cancer Paternal Cousin        Social History:  Marital Status:   [5]  Social History     Tobacco Use    Smoking status: Former     Types: Cigarettes    Smokeless tobacco: Never    Tobacco comments:     Tried to Quit (YES); YR QUIT (1980); Passive Exposure (NO)   Vaping Use    Vaping status: Never Used   Substance Use Topics    Alcohol use: Yes     Alcohol/week: 0.0 standard drinks of alcohol     Comment: RARELY    Drug use: No        Medications:    Ascorbic Acid (MADIHA-C PO)  cetirizine (ZYRTEC) 10 MG tablet  cyclobenzaprine (FLEXERIL) 10 MG tablet  rizatriptan (MAXALT-MLT) 10 MG ODT  sennosides (SENOKOT) 8.6 MG tablet  triamcinolone (KENALOG) 0.1 % external cream  valACYclovir (VALTREX) 1000 mg tablet  vitamin D3 (CHOLECALCIFEROL) 2000 units tablet          Review of Systems   Constitutional:  Negative for chills and fever.   HENT:  Positive for congestion.    Respiratory:  Positive for cough. Negative for shortness of breath.    Musculoskeletal:  Positive for back pain and myalgias.   All other systems reviewed and are negative.      Physical Exam   BP: 109/68  Pulse: 90  Temp: 98  F (36.7  C)  Resp: 18  Weight: 66.2 kg (146  lb)  SpO2: 97 %      Physical Exam  Vitals and nursing note reviewed.   Constitutional:       General: She is not in acute distress.     Appearance: Normal appearance. She is well-developed. She is not diaphoretic.   HENT:      Head: Normocephalic and atraumatic.      Right Ear: Tympanic membrane and ear canal normal.      Left Ear: Tympanic membrane and ear canal normal.      Nose: Nose normal.      Mouth/Throat:      Mouth: Mucous membranes are moist.   Eyes:      Conjunctiva/sclera: Conjunctivae normal.   Cardiovascular:      Rate and Rhythm: Normal rate and regular rhythm.      Heart sounds: Normal heart sounds.   Pulmonary:      Effort: Pulmonary effort is normal. No respiratory distress.      Breath sounds: Normal breath sounds. No wheezing or rhonchi.   Musculoskeletal:      Cervical back: Normal range of motion and neck supple.   Skin:     General: Skin is warm and dry.      Coloration: Skin is not pale.   Neurological:      Mental Status: She is alert and oriented to person, place, and time.         ED Course        Procedures         Results for orders placed or performed during the hospital encounter of 12/24/24 (from the past 24 hours)   XR Chest 2 Views    Narrative    Exam:  XR CHEST 2 VIEWS    HISTORY: cough, congestion x 1 week, now having upper back pain;  concern for pneumonia; negative for covid19.    COMPARISON:  1/7/2021.    FINDINGS:     The cardiomediastinal contours are normal.      No focal consolidation, effusion, or pneumothorax.      No acute osseous abnormality.       Impression    IMPRESSION:      No acute cardiopulmonary process.      MERLINE BRADY MD         SYSTEM ID:  [id number]       Medications - No data to display    Assessments & Plan (with Medical Decision Making)   This is a 64-year-old female with URI symptoms times a week.  Concerned that she is having a cough and with upper back pain.  Denies chest pain or shortness of breath.  Respirations are even and nonlabored.  Her  vital signs within normal limits.  She had a negative home COVID-19 test.  Declined any other viral testing today.  Patient concerned that with the back pain she has something else going on in her lungs.      Chest x-ray is negative for acute findings.  Encouraged her to continue with Mucinex and Tylenol or ibuprofen as needed for pain.  Follow-up with primary doctor next week for reevaluation if no improvement in symptoms.  Return to urgent care or Emergency Department for any worsening or concerning symptoms.    I have reviewed the nursing notes.    I have reviewed the findings, diagnosis, plan and need for follow up with the patient.  This document was prepared using a combination of typing and voice generated software.  While every attempt was made for accuracy, spelling and grammatical errors may exist.         New Prescriptions    No medications on file       Final diagnoses:   Acute cough       12/24/2024   [facility]       Mpofu, Prudence, CNP  12/24/24 1022

## 2024-12-24 NOTE — ED TRIAGE NOTES
Pt presents with cough, congestion and body aches that started Tuesday night. Pt denies fever. Pt took tylenol and mucinex at 0700.

## 2025-01-27 NOTE — PROGRESS NOTES
"  Assessment & Plan     Subacute sinusitis, unspecified location  Suspected given ongoing globus sensation and post nasal drip with throat irriation. Pt has already been doing flonase and nasal saline without improvement. Treat as below, if not improved, will need ENT to visualize area.   - doxycycline hyclate (VIBRA-TABS) 100 MG tablet  Dispense: 20 tablet; Refill: 0  - fluconazole (DIFLUCAN) 150 MG tablet  Dispense: 1 tablet; Refill: 1    BMI  Estimated body mass index is 25.69 kg/m  as calculated from the following:    Height as of this encounter: 1.6 m (5' 3\").    Weight as of this encounter: 65.8 kg (145 lb).     No follow-ups on file.    Halle Acharya is a 64 year old, presenting for the following health issues:  Establish Care and Headache        1/28/2025    10:20 AM   Additional Questions   Roomed by Leilani Miller     History of Present Illness       Reason for visit:  Establish   She is taking medications regularly.     Migraine   Since your last clinic visit, how have your headaches changed?  Improved  How often are you getting headaches or migraines? Not at all   Are you able to do normal daily activities when you have a migraine? N/A  Are you taking rescue/relief medications? (Select all that apply) No  How helpful is your rescue/relief medication?  The relief is inconsistent Has not taken any migraine medication  Are you taking any medications to prevent migraines? (Select all that apply)  No  In the past 4 weeks, how often have you gone to urgent care or the emergency room because of your headaches?  0      Pt notes sensation of something in the throat. Clearing often, notes sensation of post nasal drip without much congestion, sinus pressure. Occasional ear pressure, but mild and intermittent. Going on since Viral infection late Dec and not improving    Review of Systems  Constitutional, neuro, ENT, endocrine, pulmonary, cardiac, gastrointestinal, genitourinary, musculoskeletal, integument " "and psychiatric systems are negative, except as otherwise noted.      Objective    /72 (BP Location: Left arm, Patient Position: Sitting, Cuff Size: Adult Regular)   Pulse 80   Temp 98.3  F (36.8  C) (Tympanic)   Resp 16   Ht 1.6 m (5' 3\")   Wt 65.8 kg (145 lb)   SpO2 98%   BMI 25.69 kg/m    Body mass index is 25.69 kg/m .  Physical Exam   GENERAL: alert and no distress  HENT: ear canals and TM's normal, nose and mouth without ulcers or lesions  NECK: no adenopathy, no asymmetry, masses, or scars  RESP: lungs clear to auscultation - no rales, rhonchi or wheezes  CV: regular rates and rhythm, normal S1 S2, no S3 or S4, no murmur, click or rub, and no peripheral edema  MS: no gross musculoskeletal defects noted, no edema  PSYCH: mentation appears normal, affect normal/bright    No results found for any visits on 01/28/25.        Signed Electronically by: Nicol Null MD    The longitudinal plan of care for the diagnosis(es)/condition(s) as documented were addressed during this visit. Due to the added complexity in care, I will continue to support Patrizia in the subsequent management and with ongoing continuity of care.  "

## 2025-01-28 ENCOUNTER — OFFICE VISIT (OUTPATIENT)
Dept: FAMILY MEDICINE | Facility: OTHER | Age: 65
End: 2025-01-28
Attending: FAMILY MEDICINE
Payer: MEDICARE

## 2025-01-28 VITALS
BODY MASS INDEX: 25.69 KG/M2 | WEIGHT: 145 LBS | SYSTOLIC BLOOD PRESSURE: 114 MMHG | RESPIRATION RATE: 16 BRPM | TEMPERATURE: 98.3 F | HEART RATE: 80 BPM | OXYGEN SATURATION: 98 % | DIASTOLIC BLOOD PRESSURE: 72 MMHG | HEIGHT: 63 IN

## 2025-01-28 DIAGNOSIS — J01.90 SUBACUTE SINUSITIS, UNSPECIFIED LOCATION: Primary | ICD-10-CM

## 2025-01-28 DIAGNOSIS — M62.830 SPASM OF MUSCLE OF LOWER BACK: ICD-10-CM

## 2025-01-28 RX ORDER — CYCLOBENZAPRINE HCL 10 MG
TABLET ORAL
COMMUNITY
Start: 2025-01-28

## 2025-01-28 RX ORDER — FLUCONAZOLE 150 MG/1
150 TABLET ORAL ONCE
Qty: 1 TABLET | Refills: 1 | Status: SHIPPED | OUTPATIENT
Start: 2025-01-28 | End: 2025-01-28

## 2025-01-28 RX ORDER — DOXYCYCLINE HYCLATE 100 MG
100 TABLET ORAL 2 TIMES DAILY
Qty: 20 TABLET | Refills: 0 | Status: SHIPPED | OUTPATIENT
Start: 2025-01-28 | End: 2025-02-07

## 2025-01-28 ASSESSMENT — PAIN SCALES - GENERAL: PAINLEVEL_OUTOF10: NO PAIN (0)

## 2025-07-08 ENCOUNTER — MYC MEDICAL ADVICE (OUTPATIENT)
Dept: FAMILY MEDICINE | Facility: OTHER | Age: 65
End: 2025-07-08

## 2025-07-08 DIAGNOSIS — R93.89 ABNORMAL FINDINGS ON DIAGNOSTIC IMAGING OF OTHER SPECIFIED BODY STRUCTURES: ICD-10-CM

## 2025-07-08 DIAGNOSIS — M25.50 MULTIPLE JOINT PAIN: ICD-10-CM

## 2025-07-08 DIAGNOSIS — M60.09 INFECTIVE MYOSITIS OF MULTIPLE SITES: ICD-10-CM

## 2025-07-08 DIAGNOSIS — E55.9 VITAMIN D DEFICIENCY DISEASE: ICD-10-CM

## 2025-07-08 DIAGNOSIS — Z13.6 SCREENING FOR CARDIOVASCULAR CONDITION: ICD-10-CM

## 2025-07-08 DIAGNOSIS — Z71.89 ACP (ADVANCE CARE PLANNING): Primary | Chronic | ICD-10-CM

## 2025-07-08 NOTE — PROGRESS NOTES
Please see mychart.  Please advise.  Patient has 7/16 office visit.  I have pended some labs you can sign, add or subtract if desired.    Please let me know if you want this patient to be fasting for Lipid.  Thank you  MERY Jaramillo CC

## 2025-07-16 ENCOUNTER — LAB (OUTPATIENT)
Dept: LAB | Facility: OTHER | Age: 65
End: 2025-07-16
Payer: MEDICARE

## 2025-07-16 ENCOUNTER — OFFICE VISIT (OUTPATIENT)
Dept: FAMILY MEDICINE | Facility: OTHER | Age: 65
End: 2025-07-16
Attending: FAMILY MEDICINE
Payer: MEDICARE

## 2025-07-16 VITALS
WEIGHT: 147.6 LBS | SYSTOLIC BLOOD PRESSURE: 118 MMHG | DIASTOLIC BLOOD PRESSURE: 50 MMHG | TEMPERATURE: 97.8 F | BODY MASS INDEX: 24.59 KG/M2 | RESPIRATION RATE: 16 BRPM | OXYGEN SATURATION: 98 % | HEIGHT: 65 IN | HEART RATE: 78 BPM

## 2025-07-16 DIAGNOSIS — M60.09 INFECTIVE MYOSITIS OF MULTIPLE SITES: ICD-10-CM

## 2025-07-16 DIAGNOSIS — M05.772 RHEUMATOID ARTHRITIS INVOLVING BOTH FEET WITH POSITIVE RHEUMATOID FACTOR (H): ICD-10-CM

## 2025-07-16 DIAGNOSIS — J32.0 CHRONIC MAXILLARY SINUSITIS: ICD-10-CM

## 2025-07-16 DIAGNOSIS — M05.771 RHEUMATOID ARTHRITIS INVOLVING BOTH FEET WITH POSITIVE RHEUMATOID FACTOR (H): ICD-10-CM

## 2025-07-16 DIAGNOSIS — M53.3 PAIN OF LEFT SACROILIAC JOINT: ICD-10-CM

## 2025-07-16 DIAGNOSIS — E55.9 VITAMIN D DEFICIENCY DISEASE: ICD-10-CM

## 2025-07-16 DIAGNOSIS — M25.50 MULTIPLE JOINT PAIN: ICD-10-CM

## 2025-07-16 DIAGNOSIS — Z13.6 SCREENING FOR CARDIOVASCULAR CONDITION: ICD-10-CM

## 2025-07-16 DIAGNOSIS — R93.89 ABNORMAL FINDINGS ON DIAGNOSTIC IMAGING OF OTHER SPECIFIED BODY STRUCTURES: ICD-10-CM

## 2025-07-16 DIAGNOSIS — E78.5 HYPERLIPIDEMIA, UNSPECIFIED HYPERLIPIDEMIA TYPE: ICD-10-CM

## 2025-07-16 DIAGNOSIS — Z00.00 ENCOUNTER FOR MEDICARE ANNUAL WELLNESS EXAM: Primary | ICD-10-CM

## 2025-07-16 DIAGNOSIS — M25.551 HIP PAIN, RIGHT: ICD-10-CM

## 2025-07-16 LAB
ALBUMIN SERPL BCG-MCNC: 4.3 G/DL (ref 3.5–5.2)
ALP SERPL-CCNC: 86 U/L (ref 40–150)
ALT SERPL W P-5'-P-CCNC: 25 U/L (ref 0–50)
ANION GAP SERPL CALCULATED.3IONS-SCNC: 10 MMOL/L (ref 7–15)
AST SERPL W P-5'-P-CCNC: 23 U/L (ref 0–45)
BASOPHILS # BLD AUTO: 0 10E3/UL (ref 0–0.2)
BASOPHILS NFR BLD AUTO: 1 %
BILIRUB SERPL-MCNC: 1.4 MG/DL
BUN SERPL-MCNC: 13.2 MG/DL (ref 8–23)
CALCIUM SERPL-MCNC: 9.6 MG/DL (ref 8.8–10.4)
CHLORIDE SERPL-SCNC: 106 MMOL/L (ref 98–107)
CHOLEST SERPL-MCNC: 208 MG/DL
CREAT SERPL-MCNC: 0.74 MG/DL (ref 0.51–0.95)
CRP SERPL-MCNC: <3 MG/L
EGFRCR SERPLBLD CKD-EPI 2021: 89 ML/MIN/1.73M2
EOSINOPHIL # BLD AUTO: 0.2 10E3/UL (ref 0–0.7)
EOSINOPHIL NFR BLD AUTO: 5 %
ERYTHROCYTE [DISTWIDTH] IN BLOOD BY AUTOMATED COUNT: 13.1 % (ref 10–15)
FASTING STATUS PATIENT QL REPORTED: YES
FASTING STATUS PATIENT QL REPORTED: YES
GLUCOSE SERPL-MCNC: 87 MG/DL (ref 70–99)
HCO3 SERPL-SCNC: 25 MMOL/L (ref 22–29)
HCT VFR BLD AUTO: 44.9 % (ref 35–47)
HDLC SERPL-MCNC: 55 MG/DL
HGB BLD-MCNC: 14.8 G/DL (ref 11.7–15.7)
IMM GRANULOCYTES # BLD: 0 10E3/UL
IMM GRANULOCYTES NFR BLD: 0 %
LDLC SERPL CALC-MCNC: 120 MG/DL
LYMPHOCYTES # BLD AUTO: 1.2 10E3/UL (ref 0.8–5.3)
LYMPHOCYTES NFR BLD AUTO: 31 %
MCH RBC QN AUTO: 30.6 PG (ref 26.5–33)
MCHC RBC AUTO-ENTMCNC: 33 G/DL (ref 31.5–36.5)
MCV RBC AUTO: 93 FL (ref 78–100)
MONOCYTES # BLD AUTO: 0.3 10E3/UL (ref 0–1.3)
MONOCYTES NFR BLD AUTO: 9 %
NEUTROPHILS # BLD AUTO: 2.1 10E3/UL (ref 1.6–8.3)
NEUTROPHILS NFR BLD AUTO: 54 %
NONHDLC SERPL-MCNC: 153 MG/DL
NRBC # BLD AUTO: 0 10E3/UL
NRBC BLD AUTO-RTO: 0 /100
PLATELET # BLD AUTO: 200 10E3/UL (ref 150–450)
POTASSIUM SERPL-SCNC: 4.3 MMOL/L (ref 3.4–5.3)
PROT SERPL-MCNC: 6.7 G/DL (ref 6.4–8.3)
RBC # BLD AUTO: 4.83 10E6/UL (ref 3.8–5.2)
SODIUM SERPL-SCNC: 141 MMOL/L (ref 135–145)
TRIGL SERPL-MCNC: 163 MG/DL
TSH SERPL DL<=0.005 MIU/L-ACNC: 1.53 UIU/ML (ref 0.3–4.2)
VIT D+METAB SERPL-MCNC: 86 NG/ML (ref 20–50)
WBC # BLD AUTO: 3.8 10E3/UL (ref 4–11)

## 2025-07-16 PROCEDURE — G0463 HOSPITAL OUTPT CLINIC VISIT: HCPCS

## 2025-07-16 PROCEDURE — 80053 COMPREHEN METABOLIC PANEL: CPT | Mod: ZL

## 2025-07-16 PROCEDURE — 80061 LIPID PANEL: CPT | Mod: ZL

## 2025-07-16 PROCEDURE — 82306 VITAMIN D 25 HYDROXY: CPT | Mod: ZL

## 2025-07-16 PROCEDURE — 3049F LDL-C 100-129 MG/DL: CPT

## 2025-07-16 PROCEDURE — 85004 AUTOMATED DIFF WBC COUNT: CPT | Mod: ZL

## 2025-07-16 PROCEDURE — 82607 VITAMIN B-12: CPT | Mod: ZL

## 2025-07-16 PROCEDURE — 84443 ASSAY THYROID STIM HORMONE: CPT | Mod: ZL

## 2025-07-16 PROCEDURE — 36415 COLL VENOUS BLD VENIPUNCTURE: CPT | Mod: ZL

## 2025-07-16 PROCEDURE — 86140 C-REACTIVE PROTEIN: CPT | Mod: ZL

## 2025-07-16 RX ORDER — FLUCONAZOLE 150 MG/1
TABLET ORAL
COMMUNITY
Start: 2025-01-31 | End: 2025-07-16

## 2025-07-16 SDOH — HEALTH STABILITY: PHYSICAL HEALTH: ON AVERAGE, HOW MANY DAYS PER WEEK DO YOU ENGAGE IN MODERATE TO STRENUOUS EXERCISE (LIKE A BRISK WALK)?: 5 DAYS

## 2025-07-16 ASSESSMENT — PATIENT HEALTH QUESTIONNAIRE - PHQ9
10. IF YOU CHECKED OFF ANY PROBLEMS, HOW DIFFICULT HAVE THESE PROBLEMS MADE IT FOR YOU TO DO YOUR WORK, TAKE CARE OF THINGS AT HOME, OR GET ALONG WITH OTHER PEOPLE: NOT DIFFICULT AT ALL
SUM OF ALL RESPONSES TO PHQ QUESTIONS 1-9: 0
SUM OF ALL RESPONSES TO PHQ QUESTIONS 1-9: 0

## 2025-07-16 ASSESSMENT — ANXIETY QUESTIONNAIRES
4. TROUBLE RELAXING: NOT AT ALL
7. FEELING AFRAID AS IF SOMETHING AWFUL MIGHT HAPPEN: NOT AT ALL
7. FEELING AFRAID AS IF SOMETHING AWFUL MIGHT HAPPEN: NOT AT ALL
1. FEELING NERVOUS, ANXIOUS, OR ON EDGE: NOT AT ALL
5. BEING SO RESTLESS THAT IT IS HARD TO SIT STILL: NOT AT ALL
GAD7 TOTAL SCORE: 0
3. WORRYING TOO MUCH ABOUT DIFFERENT THINGS: NOT AT ALL
GAD7 TOTAL SCORE: 0
GAD7 TOTAL SCORE: 0
6. BECOMING EASILY ANNOYED OR IRRITABLE: NOT AT ALL
2. NOT BEING ABLE TO STOP OR CONTROL WORRYING: NOT AT ALL

## 2025-07-16 ASSESSMENT — SOCIAL DETERMINANTS OF HEALTH (SDOH): HOW OFTEN DO YOU GET TOGETHER WITH FRIENDS OR RELATIVES?: MORE THAN THREE TIMES A WEEK

## 2025-07-16 ASSESSMENT — PAIN SCALES - GENERAL: PAINLEVEL_OUTOF10: NO PAIN (0)

## 2025-07-16 NOTE — PROGRESS NOTES
Patient called again for UA test results.  Please call patient at [phone] and/or [phone].     Preventive Care Visit  [facility]  Nicol Null MD, Family Medicine  Jul 16, 2025    Assessment & Plan     Encounter for Medicare annual wellness exam    Chronic maxillary sinusitis  Now recurrent sx with eye pain. Will update CT. Continue otc supportive care and allergy medication.   - CT Sinus w/o Contrast    Rheumatoid arthritis involving both feet with positive rheumatoid factor (H)  Noted on labs, will get [facility] records. Not on medications, no specific joint concerns today. CRP is low. Ok to monitor  - CRP, inflammation  - Vitamin B12    Hip pain, right  Posterior and lateral. ? IT band. Chronic, mild, waxes and wanes. Will get PT eval.   - Physical Therapy  Referral    Pain of left sacroiliac joint  Occasional catching and pain that resolves with movement/stretching. PT eval   - Physical Therapy  Referral    Patient has been advised of split billing requirements and indicates understanding: Yes    Counseling  Appropriate preventive services were addressed with this patient via screening, questionnaire, or discussion as appropriate for fall prevention, nutrition, physical activity, Tobacco-use cessation, social engagement, weight loss and cognition.  Checklist reviewing preventive services available has been given to the patient.  Reviewed patient's diet, addressing concerns and/or questions.   Reviewed preventive health counseling, as reflected in patient instructions    Follow-up  No follow-ups on file.    Halle Acharya is a 65 year old, presenting for the following:  Physical        7/16/2025     9:45 AM   Additional Questions   Roomed by Godfrey lpn   Accompanied by self          HPI     Advance Care Planning  Discussed advance care planning with patient; however, patient declined at this time.        7/16/2025   General Health   How would you rate your overall physical health? Good   Feel stress (tense, anxious, or unable to sleep) Not at all         7/16/2025    Nutrition   Diet: Regular (no restrictions)         7/16/2025   Exercise   Days per week of moderate/strenous exercise 5 days         7/16/2025   Social Factors   Frequency of gathering with friends or relatives More than three times a week   Worry food won't last until get money to buy more No   Food not last or not have enough money for food? No   Do you have housing? (Housing is defined as stable permanent housing and does not include staying outside in a car, in a tent, in an abandoned building, in an overnight shelter, or couch-surfing.) Yes   Are you worried about losing your housing? No   Lack of transportation? No   Unable to get utilities (heat,electricity)? No         7/16/2025   Fall Risk   Fallen 2 or more times in the past year? No   Trouble with walking or balance? No          7/16/2025   Activities of Daily Living- Home Safety   Needs help with the following daily activites None of the above   Safety concerns in the home None of the above         7/16/2025   Dental   Dentist two times every year? Yes         7/16/2025   Hearing Screening   Hearing concerns? None of the above         7/16/2025   Driving Risk Screening   Patient/family members have concerns about driving No         7/16/2025   General Alertness/Fatigue Screening   Have you been more tired than usual lately? No         7/16/2025   Urinary Incontinence Screening   Bothered by leaking urine in past 6 months No       Today's PHQ-9 Score:       7/16/2025     9:42 AM   PHQ-9 SCORE   PHQ-9 Total Score MyChart 0   PHQ-9 Total Score 0        Patient-reported         7/16/2025   Substance Use   Alcohol more than 3/day or more than 7/wk Not Applicable   Do you have a current opioid prescription? No   How severe/bad is pain from 1 to 10? 0/10 (No Pain)   Do you use any other substances recreationally? No     Social History     Tobacco Use    Smoking status: Former     Types: Cigarettes    Smokeless tobacco: Never    Tobacco comments:     Tried to  Quit (YES); YR QUIT (1980); Passive Exposure (NO)   Vaping Use    Vaping status: Never Used   Substance Use Topics    Alcohol use: Yes     Alcohol/week: 0.0 standard drinks of alcohol     Comment: RARELY    Drug use: No         7/5/2024   LAST FHS-7 RESULTS   2 or more relatives with breast AND/OR ovarian cancer --   2 or more relatives with breast AND/OR bowel cancer --     Mammogram Screening - Mammogram every 1-2 years updated in Health Maintenance based on mutual decision making      History of abnormal Pap smear: Status post hysterectomy with removal of cervix and no history of CIN2 or greater or cervical cancer. Health Maintenance and Surgical History updated.       ASCVD Risk     The 10-year ASCVD risk score (Wm VAZQUEZ, et al., 2019) is: 5.4%    Values used to calculate the score:      Age: 65 years      Sex: Female      Is Non- : No      Diabetic: No      Tobacco smoker: No      Systolic Blood Pressure: 118 mmHg      Is BP treated: No      HDL Cholesterol: 55 mg/dL      Total Cholesterol: 208 mg/dL    Reviewed and updated as needed this visit by Provider   Tobacco  Allergies  Meds  Problems  Med Hx  Surg Hx  Fam Hx            Current providers sharing in care for this patient include:  Patient Care Team:  Nicol Null MD as PCP - General (Family Medicine)  Toyin Puri PA as Assigned PCP    The following health maintenance items are reviewed in Epic and correct as of today:  Health Maintenance   Topic Date Due    DTAP/TDAP/TD VACCINE (5 - Tdap) 10/25/1991    ZOSTER VACCINE (1 of 2) Never done    LUNG CANCER SCREENING  Never done    COVID-19 VACCINE (3 - 2024-25 season) 09/01/2024    INFLUENZA VACCINE (1) 09/01/2025    MAMMO SCREENING  07/05/2026    MEDICARE ANNUAL WELLNESS VISIT  07/16/2026    LUIZA ASSESSMENT  07/16/2026    FALL RISK ASSESSMENT  07/16/2026    PHQ-9  07/16/2026    DIABETES SCREENING  07/16/2028    LIPID  07/16/2030    ADVANCE CARE PLANNING   "07/16/2030    COLORECTAL CANCER SCREENING  03/22/2031    RSV VACCINE (1 - 1-dose 75+ series) 04/08/2034    DEXA  03/21/2037    HEPATITIS C SCREENING  Completed    PHQ-2 (once per calendar year)  Completed    PNEUMOCOCCAL VACCINE 50+ YEARS  Completed    HPV VACCINE  Aged Out    MENINGITIS VACCINE  Aged Out         Review of Systems  Constitutional, neuro, ENT, endocrine, pulmonary, cardiac, gastrointestinal, genitourinary, musculoskeletal, integument and psychiatric systems are negative, except as otherwise noted.     Objective    Exam  /50 (BP Location: Left arm, Patient Position: Sitting, Cuff Size: Adult Regular)   Pulse 78   Temp 97.8  F (36.6  C) (Tympanic)   Resp 16   Ht 1.651 m (5' 5\")   Wt 67 kg (147 lb 9.6 oz)   SpO2 98%   BMI 24.56 kg/m     Estimated body mass index is 24.56 kg/m  as calculated from the following:    Height as of this encounter: 1.651 m (5' 5\").    Weight as of this encounter: 67 kg (147 lb 9.6 oz).    Physical Exam    GENERAL: alert and no distress  HENT: ear canals and TM's normal, nose and mouth without ulcers or lesions  NECK: no adenopathy, no asymmetry, masses, or scars  RESP: lungs clear to auscultation - no rales, rhonchi or wheezes  BREAST: normal without masses, tenderness or nipple discharge and no palpable axillary masses or adenopathy  CV: regular rate and rhythm, normal S1 S2, no S3 or S4, no murmur, click or rub, no peripheral edema  ABDOMEN: soft, nontender, no hepatosplenomegaly, no masses and bowel sounds normal  MS: extremities normal- no gross deformities noted, ttp just posterior to greater trochanter on the riht hip, there is ttp left superior SI joint. Strength and ROM wnl  SKIN: no suspicious lesions or rashes  NEURO: Normal strength and tone, mentation intact and speech normal  PSYCH: mentation appears normal, affect normal/bright        7/16/2025   Mini Cog   Mini-Cog Not Completed (choose reason) Patient declines     Patient declines, there are NO " concerns for cognitive deficits.        7/5/2024   Vision Screen   Reason Vision Screen Not Completed Patient had exam in last 12 months        Data saved with a previous flowsheet row definition       Signed Electronically by: Nicol Null MD    The longitudinal plan of care for the diagnosis(es)/condition(s) as documented were addressed during this visit. Due to the added complexity in care, I will continue to support Patrizia in the subsequent management and with ongoing continuity of care.      Answers submitted by the patient for this visit:  Patient Health Questionnaire (Submitted on 7/16/2025)  If you checked off any problems, how difficult have these problems made it for you to do your work, take care of things at home, or get along with other people?: Not difficult at all  PHQ9 TOTAL SCORE: 0  Patient Health Questionnaire (G7) (Submitted on 7/16/2025)  LUIZA 7 TOTAL SCORE: 0

## 2025-07-16 NOTE — PATIENT INSTRUCTIONS
Patient Education   Preventive Care Advice   This is general advice given by our system to help you stay healthy. However, your care team may have specific advice just for you. Please talk to your care team about your preventive care needs.  Nutrition  Eat 5 or more servings of fruits and vegetables each day.  Try wheat bread, brown rice and whole grain pasta (instead of white bread, rice, and pasta).  Get enough calcium and vitamin D. Check the label on foods and aim for 100% of the RDA (recommended daily allowance).  Lifestyle  Exercise at least 150 minutes each week  (30 minutes a day, 5 days a week).  Do muscle strengthening activities 2 days a week. These help control your weight and prevent disease.  No smoking.  Wear sunscreen to prevent skin cancer.  Have a dental exam and cleaning every 6 months.  Yearly exams  See your health care team every year to talk about:  Any changes in your health.  Any medicines your care team has prescribed.  Preventive care, family planning, and ways to prevent chronic diseases.  Shots (vaccines)   HPV shots (up to age 26), if you've never had them before.  Hepatitis B shots (up to age 59), if you've never had them before.  COVID-19 shot: Get this shot when it's due.  Flu shot: Get a flu shot every year.  Tetanus shot: Get a tetanus shot every 10 years.  Pneumococcal, hepatitis A, and RSV shots: Ask your care team if you need these based on your risk.  Shingles shot (for age 50 and up)  General health tests  Diabetes screening:  Starting at age 35, Get screened for diabetes at least every 3 years.  If you are younger than age 35, ask your care team if you should be screened for diabetes.  Cholesterol test: At age 39, start having a cholesterol test every 5 years, or more often if advised.  Bone density scan (DEXA): At age 50, ask your care team if you should have this scan for osteoporosis (brittle bones).  Hepatitis C: Get tested at least once in your life.  STIs (sexually  transmitted infections)  Before age 24: Ask your care team if you should be screened for STIs.  After age 24: Get screened for STIs if you're at risk. You are at risk for STIs (including HIV) if:  You are sexually active with more than one person.  You don't use condoms every time.  You or a partner was diagnosed with a sexually transmitted infection.  If you are at risk for HIV, ask about PrEP medicine to prevent HIV.  Get tested for HIV at least once in your life, whether you are at risk for HIV or not.  Cancer screening tests  Cervical cancer screening: If you have a cervix, begin getting regular cervical cancer screening tests starting at age 21.  Breast cancer scan (mammogram): If you've ever had breasts, begin having regular mammograms starting at age 40. This is a scan to check for breast cancer.  Colon cancer screening: It is important to start screening for colon cancer at age 45.  Have a colonoscopy test every 10 years (or more often if you're at risk) Or, ask your provider about stool tests like a FIT test every year or Cologuard test every 3 years.  To learn more about your testing options, visit:   .  For help making a decision, visit:   https://bit.ly/yj36875.  Prostate cancer screening test: If you have a prostate, ask your care team if a prostate cancer screening test (PSA) at age 55 is right for you.  Lung cancer screening: If you are a current or former smoker ages 50 to 80, ask your care team if ongoing lung cancer screenings are right for you.  For informational purposes only. Not to replace the advice of your health care provider. Copyright   2023 [employer]. All rights reserved. Clinically reviewed by the [facility] Transitions Program. [employer] [id number] - REV 01/24.

## 2025-07-17 ENCOUNTER — RESULTS FOLLOW-UP (OUTPATIENT)
Dept: FAMILY MEDICINE | Facility: OTHER | Age: 65
End: 2025-07-17

## 2025-07-17 ENCOUNTER — HOSPITAL ENCOUNTER (OUTPATIENT)
Dept: CT IMAGING | Facility: HOSPITAL | Age: 65
End: 2025-07-17
Attending: FAMILY MEDICINE
Payer: MEDICARE

## 2025-07-17 DIAGNOSIS — J32.0 CHRONIC MAXILLARY SINUSITIS: ICD-10-CM

## 2025-07-17 LAB — VIT B12 SERPL-MCNC: 223 PG/ML (ref 232–1245)

## 2025-07-17 PROCEDURE — 70486 CT MAXILLOFACIAL W/O DYE: CPT | Mod: 26 | Performed by: STUDENT IN AN ORGANIZED HEALTH CARE EDUCATION/TRAINING PROGRAM

## 2025-07-17 PROCEDURE — 70486 CT MAXILLOFACIAL W/O DYE: CPT

## 2025-07-23 ENCOUNTER — THERAPY VISIT (OUTPATIENT)
Dept: PHYSICAL THERAPY | Facility: HOSPITAL | Age: 65
End: 2025-07-23
Attending: FAMILY MEDICINE
Payer: MEDICARE

## 2025-07-23 DIAGNOSIS — M25.551 HIP PAIN, RIGHT: ICD-10-CM

## 2025-07-23 DIAGNOSIS — M53.3 PAIN OF LEFT SACROILIAC JOINT: ICD-10-CM

## 2025-07-23 PROCEDURE — 999N000104 HC STATISTIC NO CHARGE

## 2025-07-23 PROCEDURE — 97161 PT EVAL LOW COMPLEX 20 MIN: CPT | Mod: GP

## 2025-07-23 PROCEDURE — 97110 THERAPEUTIC EXERCISES: CPT | Mod: GP

## 2025-07-23 ASSESSMENT — ACTIVITIES OF DAILY LIVING (ADL)
WALKING_APPROXIMATELY_10_MINUTES: NO DIFFICULTY AT ALL
GOING DOWN 1 FLIGHT OF STAIRS: NO DIFFICULTY AT ALL
SITTING FOR 15 MINUTES: NO DIFFICULTY AT ALL
ROLLING OVER IN BED: SLIGHT DIFFICULTY
WALKING_UP_STEEP_HILLS: NO DIFFICULTY AT ALL
LIGHT_TO_MODERATE_WORK: NO DIFFICULTY AT ALL
HOS_ADL_ITEM_SCORE_TOTAL: 66
STEPPING UP AND DOWN CURBS: NO DIFFICULTY AT ALL
HOW_WOULD_YOU_RATE_YOUR_CURRENT_LEVEL_OF_FUNCTION_DURING_YOUR_USUAL_ACTIVITIES_OF_DAILY_LIVING_FROM_0_TO_100_WITH_100_BEING_YOUR_LEVEL_OF_FUNCTION_PRIOR_TO_YOUR_HIP_PROBLEM_AND_0_BEING_THE_INABILITY_TO_PERFORM_ANY_OF_YOUR_USUAL_DAILY_ACTIVITIES?: 2
HEAVY_WORK: NO DIFFICULTY AT ALL
HOS_ADL_SCORE(%): 97.06
DEEP SQUATTING: NO DIFFICULTY AT ALL
WALKING_15_MINUTES_OR_GREATER: NO DIFFICULTY AT ALL
RECREATIONAL ACTIVITIES: NO DIFFICULTY AT ALL
PUTTING ON SOCKS AND SHOES: NO DIFFICULTY AT ALL
GOING UP 1 FLIGHT OF STAIRS: NO DIFFICULTY AT ALL
WALKING_INITIALLY: SLIGHT DIFFICULTY
HOS_ADL_HIGHEST_POTENTIAL_SCORE: 68
TWISTING/PIVOTING ON INVOLVED LEG: NO DIFFICULTY AT ALL
STANDING FOR 15 MINUTES: NO DIFFICULTY AT ALL
WALKING_DOWN_STEEP_HILLS: NO DIFFICULTY AT ALL
GETTING_INTO_AND_OUT_OF_A_BATHTUB: NO DIFFICULTY AT ALL
GETTING INTO AND OUT OF AN AVERAGE CAR: NO DIFFICULTY AT ALL

## 2025-07-23 NOTE — PROGRESS NOTES
"PHYSICAL THERAPY EVALUATION  Type of Visit: Evaluation       Fall Risk Screen:  Have you fallen 2 or more times in the past year?: No  Have you fallen and had an injury in the past year?: No    Subjective         Presenting condition or subjective complaint: Tband in left hip\" Pt. Reports to therapy today on referral of Dr. Null for evaluation right Hip pain and SI joint pain. Pt. Reports insidious onset of chronic hip and SI pain she describes as an ache and feeling of severe tension in left IT band. Pt. Denies radiation of pain, numbness, or weakness into either LE. Pain worse with daily walking and increase in activity. She has been managing her pain to date with yoga and OTC medications. Goals for therapy of decreasing pain and restoring PLOF.     Date of onset: 07/16/25    Relevant medical history:     Dates & types of surgery: gall    Prior diagnostic imaging/testing results:       Prior therapy history for the same diagnosis, illness or injury: No      Prior Level of Function  Transfers: Independent  Ambulation: Independent  ADL: Independent  IADL:     Living Environment  Social support: Alone   Type of home: House   Stairs to enter the home: Yes 16 Is there a railing: Yes     Ramp: No   Stairs inside the home: Yes 16 Is there a railing: Yes     Help at home: None  Equipment owned:       Employment: No    Hobbies/Interests: yoga garden reading some pickle ball    Patient goals for therapy: na    Pain assessment: Pain present  See objective evaluation for additional pain details     Objective   HIP EVALUATION  PAIN: Pain Level at Rest: 2/10  Pain Level with Use: 2/10  Pain Location: lumbar spine, hip, and knee  Pain Quality: Aching, Dull, and tension   Pain Frequency: intermittent  Pain is Worst: daytime  Pain is Exacerbated By: Increased activity   Pain is Relieved By: otc medications  Pain Progression: Unchanged  INTEGUMENTARY (edema, incisions): WNL  POSTURE: WNL  GAIT:   Weightbearing Status: " WBAT  Assistive Device(s): None  Gait Deviations: Mild antalgia   BALANCE/PROPRIOCEPTION: WNL  WEIGHTBEARING ALIGNMENT: WNL  NON-WEIGHTBEARING ALIGNMENT: WNL   ROM:   (Degrees) Left AROM Left PROM  Right AROM Right PROM   Hip Flexion WNL WNL WNL WNL   Hip Extension WNL WNL WNL WNL   Hip Abduction WNL WNL WNL WNL   Hip Adduction Mild tension in gluteals and piriformis Mild tension in gluteals and piriformis Mild tension in gluteals and piriformis Mild tension in gluteals and piriformis   Hip Internal Rotation WNL Min pain  WNL Min pain  WNL Min pain  WNL Min pain    Hip External Rotation WNL WNL WNL WNL   Knee Flexion WNL WNL WNL WNL   Knee Extension WNL WNL WNL WNL   Lumbar Side glide Mod limitation with pain  Mod limitation with pain    Lumbar Flexion WNL   Lumbar Extension Mod limitation with pain    Pain:   End feel:     PELVIC/SI SCREEN: Pt. Did have apparent LLD with L LE appearing shorter.   STRENGTH:   Pain: - none + mild ++ moderate +++ severe  Strength Scale: 0-5/5 Left Right   Hip Flexion 4 4   Hip Extension 4 4   Hip Abduction 5 5   Hip Adduction     Hip Internal Rotation 4 4   Hip External Rotation 4 4   Knee Flexion 5 5   Knee Extension 5 5     LE FLEXIBILITY: Decreased adductors L, Decreased piriformis L, Decreased hip flexors L, Decreased adductors R, Decreased piriformis R, Decreased hip flexors R  SPECIAL TESTS:    Left Right   KURTIS Positive Positive   FADIR/Labrum/ILENE Positive Positive   Femoral Nerve     Og's     Piriformis     Quadrant Testing     SLR Negative  Negative    Slump     Stork with Extension     Jonas            PALPATION:   + Tenderness At Location Left Right   Ischial Tuberosity     Greater Trochanter + +   IT Band + +   Hip Flexors     Piriformis + +   PSIS     ASIS     Adductors     Abductors     Iliac Crest     Glut Medius     Bursa     Pubis       LUMBAR SPINE EVALUATION    MYOTOMES:    Left Right   T12-L3 (Hip Flexion) 4 4   L2-4 (Quads)  5 5   L4 (Ankle DF) 5 5   L5 (Great  Toe Ext) 5 5   S1 (Toe Raise) 5 5     DTR S:    Left Right   C5 (Biceps)     C6 (Brachioradialis)     C7 (Triceps)     L4 (Quad) 2 2   S1 (Achilles) 2 2     CORD SIGNS: WNL  DERMATOMES: WNL    SPINAL SEGMENTAL CONCLUSIONS: Hypomobility with localized pain reproduction       Assessment & Plan   CLINICAL IMPRESSIONS  Medical Diagnosis: Hip pain, right (M25.551), Pain of left sacroiliac joint (M53.3)    Treatment Diagnosis: Gluteal tednonopathy L/R, bilateral hip bursitis, Lumbar facet irritation   Impression/Assessment: Patient is a 65 year old female with complaint of low back, and bilateral hip pain. Clinical testing is consistent with bilateral gluteal tendinopathy, bursitis, and lumbar facet irritation.  The following significant findings have been identified: Pain, Decreased ROM/flexibility, Decreased joint mobility, Decreased strength, Impaired gait, Impaired muscle performance, and Decreased activity tolerance. These impairments interfere with their ability to perform self care tasks, recreational activities, household chores, household mobility, and community mobility as compared to previous level of function.     Clinical Decision Making (Complexity):  Clinical Presentation: Stable/Uncomplicated  Clinical Presentation Rationale: based on medical and personal factors listed in PT evaluation  Clinical Decision Making (Complexity): Low complexity    PLAN OF CARE  Treatment Interventions:  Modalities: Cryotherapy, Dry Needling, E-stim, Hot Pack, Iontophoresis, Ultrasound, Vasoneumatic Device  Interventions: Gait Training, Manual Therapy, Neuromuscular Re-education, Therapeutic Activity, Therapeutic Exercise, Self-Care/Home Management    Long Term Goals     PT Goal 1  Goal Identifier: LTG #1  Goal Description: Pt. to be independent with correct performance of HEP to begin self management of her condition.  Target Date: 09/24/25  PT Goal 2  Goal Identifier: LTG #2  Goal Description: Pt. to report 75% or greater  improvement in sleep disturbance restoring restful quality.  Target Date: 09/24/25  PT Goal 3  Goal Identifier: LTG #3  Goal Description: Pt. to restore lumbar extrnsion  and sidebending mobility to WNL with minimal pain to improve tolerance for daily activities.  Target Date: 09/24/25  PT Goal 4  Goal Identifier: STG #1  Goal Description: Pt. to report 50% or greater decrease in level of pain at worst.  Target Date: 08/22/25      Frequency of Treatment: 1x week  Duration of Treatment: 9 weeks    Recommended Referrals to Other Professionals:   Education Assessment:   Learner/Method: Patient;Listening;Reading;Demonstration;No Barriers to Learning;Pictures/Video    Risks and benefits of evaluation/treatment have been explained.   Patient/Family/caregiver agrees with Plan of Care.     Evaluation Time:     PT Eval, Low Complexity Minutes (43271): 24       Signing Clinician: Carmine Norton PT        [facility]                                                                                   OUTPATIENT PHYSICAL THERAPY      PLAN OF TREATMENT FOR OUTPATIENT REHABILITATION   Patient's Last Name, First Name, Patrizia Shipman YOB: 1960   Provider's Name   [facility]   Medical Record No.  [id number]     Onset Date: 07/16/25  Start of Care Date: 07/23/25     Medical Diagnosis:  Hip pain, right (M25.551), Pain of left sacroiliac joint (M53.3)      PT Treatment Diagnosis:  Gluteal tednonopathy L/R, bilateral hip bursitis, Lumbar facet irritation Plan of Treatment  Frequency/Duration: 1x week/ 9 weeks    Certification date from 07/23/25 to 09/24/25         See note for plan of treatment details and functional goals     Carmine Norton, PT                         I CERTIFY THE NEED FOR THESE SERVICES FURNISHED UNDER        THIS PLAN OF TREATMENT AND WHILE UNDER MY CARE     (Physician attestation of this document indicates review and certification  of the therapy plan).              Referring Provider:  Nicol Null    Initial Assessment  See Epic Evaluation- Start of Care Date: 07/23/25

## 2025-08-29 ENCOUNTER — ANCILLARY PROCEDURE (OUTPATIENT)
Dept: MAMMOGRAPHY | Facility: OTHER | Age: 65
End: 2025-08-29
Attending: FAMILY MEDICINE
Payer: MEDICARE

## 2025-08-29 DIAGNOSIS — Z12.31 VISIT FOR SCREENING MAMMOGRAM: ICD-10-CM

## 2025-08-29 PROCEDURE — 77063 BREAST TOMOSYNTHESIS BI: CPT | Mod: 26 | Performed by: RADIOLOGY

## 2025-08-29 PROCEDURE — 77067 SCR MAMMO BI INCL CAD: CPT | Mod: 26 | Performed by: RADIOLOGY

## 2025-08-29 PROCEDURE — 77063 BREAST TOMOSYNTHESIS BI: CPT | Mod: TC
